# Patient Record
Sex: MALE | Race: WHITE | Employment: OTHER | URBAN - METROPOLITAN AREA
[De-identification: names, ages, dates, MRNs, and addresses within clinical notes are randomized per-mention and may not be internally consistent; named-entity substitution may affect disease eponyms.]

---

## 2017-02-07 ENCOUNTER — TELEPHONE (OUTPATIENT)
Dept: CARDIOLOGY CLINIC | Age: 72
End: 2017-02-07

## 2017-02-28 RX ORDER — HYDRALAZINE HYDROCHLORIDE 50 MG/1
TABLET, FILM COATED ORAL
Qty: 270 TABLET | Refills: 11 | OUTPATIENT
Start: 2017-02-28

## 2017-03-13 RX ORDER — RAMIPRIL 10 MG/1
CAPSULE ORAL
Qty: 180 CAPSULE | Refills: 3 | Status: SHIPPED | OUTPATIENT
Start: 2017-03-13 | End: 2017-09-28 | Stop reason: ALTCHOICE

## 2017-04-05 ENCOUNTER — OFFICE VISIT (OUTPATIENT)
Dept: CARDIOLOGY CLINIC | Age: 72
End: 2017-04-05

## 2017-04-05 VITALS
WEIGHT: 209 LBS | DIASTOLIC BLOOD PRESSURE: 56 MMHG | HEIGHT: 72 IN | HEART RATE: 60 BPM | SYSTOLIC BLOOD PRESSURE: 116 MMHG | BODY MASS INDEX: 28.31 KG/M2

## 2017-04-05 DIAGNOSIS — E78.2 MIXED HYPERLIPIDEMIA: ICD-10-CM

## 2017-04-05 DIAGNOSIS — I10 ESSENTIAL HYPERTENSION, BENIGN: ICD-10-CM

## 2017-04-05 DIAGNOSIS — I25.10 ATHEROSCLEROSIS OF NATIVE CORONARY ARTERY OF NATIVE HEART, ANGINA PRESENCE UNSPECIFIED: ICD-10-CM

## 2017-04-05 DIAGNOSIS — R53.83 FATIGUE, UNSPECIFIED TYPE: Primary | ICD-10-CM

## 2017-04-05 DIAGNOSIS — R07.9 CHEST PAIN, UNSPECIFIED TYPE: ICD-10-CM

## 2017-04-05 DIAGNOSIS — I25.10 CORONARY ARTERY DISEASE INVOLVING NATIVE HEART WITHOUT ANGINA PECTORIS, UNSPECIFIED VESSEL OR LESION TYPE: ICD-10-CM

## 2017-04-05 DIAGNOSIS — I25.10 CORONARY ARTERY DISEASE INVOLVING NATIVE CORONARY ARTERY OF NATIVE HEART WITHOUT ANGINA PECTORIS: ICD-10-CM

## 2017-04-05 PROCEDURE — 1036F TOBACCO NON-USER: CPT | Performed by: INTERNAL MEDICINE

## 2017-04-05 PROCEDURE — G8598 ASA/ANTIPLAT THER USED: HCPCS | Performed by: INTERNAL MEDICINE

## 2017-04-05 PROCEDURE — 99214 OFFICE O/P EST MOD 30 MIN: CPT | Performed by: INTERNAL MEDICINE

## 2017-04-05 PROCEDURE — 93000 ELECTROCARDIOGRAM COMPLETE: CPT | Performed by: INTERNAL MEDICINE

## 2017-04-05 PROCEDURE — G8427 DOCREV CUR MEDS BY ELIG CLIN: HCPCS | Performed by: INTERNAL MEDICINE

## 2017-04-05 PROCEDURE — 1123F ACP DISCUSS/DSCN MKR DOCD: CPT | Performed by: INTERNAL MEDICINE

## 2017-04-05 PROCEDURE — 3017F COLORECTAL CA SCREEN DOC REV: CPT | Performed by: INTERNAL MEDICINE

## 2017-04-05 PROCEDURE — G8420 CALC BMI NORM PARAMETERS: HCPCS | Performed by: INTERNAL MEDICINE

## 2017-04-05 PROCEDURE — 4040F PNEUMOC VAC/ADMIN/RCVD: CPT | Performed by: INTERNAL MEDICINE

## 2017-04-05 RX ORDER — AMLODIPINE BESYLATE 10 MG/1
10 TABLET ORAL DAILY
Qty: 90 TABLET | Refills: 3 | Status: SHIPPED | OUTPATIENT
Start: 2017-04-05 | End: 2017-09-28 | Stop reason: SDUPTHER

## 2017-04-05 RX ORDER — LABETALOL 200 MG/1
200 TABLET, FILM COATED ORAL EVERY 12 HOURS SCHEDULED
Qty: 180 TABLET | Refills: 3 | Status: SHIPPED | OUTPATIENT
Start: 2017-04-05 | End: 2018-05-01 | Stop reason: SDUPTHER

## 2017-04-05 RX ORDER — NITROGLYCERIN 400 UG/1
1 SPRAY ORAL EVERY 5 MIN PRN
Qty: 1 BOTTLE | Refills: 2 | Status: SHIPPED | OUTPATIENT
Start: 2017-04-05

## 2017-05-30 ENCOUNTER — OFFICE VISIT (OUTPATIENT)
Dept: CARDIOLOGY CLINIC | Age: 72
End: 2017-05-30

## 2017-05-30 VITALS
SYSTOLIC BLOOD PRESSURE: 142 MMHG | WEIGHT: 208 LBS | DIASTOLIC BLOOD PRESSURE: 76 MMHG | HEART RATE: 64 BPM | BODY MASS INDEX: 28.21 KG/M2

## 2017-05-30 DIAGNOSIS — I25.10 CORONARY ARTERY DISEASE DUE TO LIPID RICH PLAQUE: Primary | ICD-10-CM

## 2017-05-30 DIAGNOSIS — I65.23 BILATERAL CAROTID ARTERY STENOSIS: ICD-10-CM

## 2017-05-30 DIAGNOSIS — Z13.9 SCREENING: ICD-10-CM

## 2017-05-30 DIAGNOSIS — I10 ESSENTIAL HYPERTENSION, BENIGN: ICD-10-CM

## 2017-05-30 DIAGNOSIS — I25.83 CORONARY ARTERY DISEASE DUE TO LIPID RICH PLAQUE: Primary | ICD-10-CM

## 2017-05-30 DIAGNOSIS — E78.2 MIXED HYPERLIPIDEMIA: ICD-10-CM

## 2017-05-30 PROCEDURE — G8598 ASA/ANTIPLAT THER USED: HCPCS | Performed by: INTERNAL MEDICINE

## 2017-05-30 PROCEDURE — G8420 CALC BMI NORM PARAMETERS: HCPCS | Performed by: INTERNAL MEDICINE

## 2017-05-30 PROCEDURE — 3017F COLORECTAL CA SCREEN DOC REV: CPT | Performed by: INTERNAL MEDICINE

## 2017-05-30 PROCEDURE — 1123F ACP DISCUSS/DSCN MKR DOCD: CPT | Performed by: INTERNAL MEDICINE

## 2017-05-30 PROCEDURE — 99214 OFFICE O/P EST MOD 30 MIN: CPT | Performed by: INTERNAL MEDICINE

## 2017-05-30 PROCEDURE — 4040F PNEUMOC VAC/ADMIN/RCVD: CPT | Performed by: INTERNAL MEDICINE

## 2017-05-30 PROCEDURE — 1036F TOBACCO NON-USER: CPT | Performed by: INTERNAL MEDICINE

## 2017-05-30 PROCEDURE — G8427 DOCREV CUR MEDS BY ELIG CLIN: HCPCS | Performed by: INTERNAL MEDICINE

## 2017-05-30 RX ORDER — OMEPRAZOLE 40 MG/1
40 CAPSULE, DELAYED RELEASE ORAL DAILY
COMMUNITY
End: 2019-01-01

## 2017-05-30 RX ORDER — SENNOSIDES 8.6 MG
TABLET ORAL
COMMUNITY
End: 2017-09-28 | Stop reason: ALTCHOICE

## 2017-06-29 RX ORDER — CLOPIDOGREL BISULFATE 75 MG/1
TABLET ORAL
Qty: 90 TABLET | Refills: 3 | Status: SHIPPED | OUTPATIENT
Start: 2017-06-29 | End: 2019-07-22 | Stop reason: SDUPTHER

## 2017-09-26 ENCOUNTER — TELEPHONE (OUTPATIENT)
Dept: CARDIOLOGY CLINIC | Age: 72
End: 2017-09-26

## 2017-09-26 NOTE — TELEPHONE ENCOUNTER
Left a message for pt to call us back in regards to ramipril. Wanted to see if pt is still taking and if not then why? We received fax from [facility] that pt may not be taking this script.

## 2017-09-27 NOTE — TELEPHONE ENCOUNTER
Bettie Meza is waiting on pt to call back so she can put him in your cancelled appointment slot for tomorrow is this ok?

## 2017-09-28 ENCOUNTER — OFFICE VISIT (OUTPATIENT)
Dept: CARDIOLOGY CLINIC | Age: 72
End: 2017-09-28

## 2017-09-28 VITALS
WEIGHT: 207 LBS | HEART RATE: 60 BPM | DIASTOLIC BLOOD PRESSURE: 80 MMHG | SYSTOLIC BLOOD PRESSURE: 126 MMHG | OXYGEN SATURATION: 96 % | BODY MASS INDEX: 28.04 KG/M2 | HEIGHT: 72 IN

## 2017-09-28 DIAGNOSIS — I25.10 ATHEROSCLEROSIS OF NATIVE CORONARY ARTERY OF NATIVE HEART, ANGINA PRESENCE UNSPECIFIED: ICD-10-CM

## 2017-09-28 DIAGNOSIS — E78.5 HYPERLIPIDEMIA, UNSPECIFIED HYPERLIPIDEMIA TYPE: ICD-10-CM

## 2017-09-28 DIAGNOSIS — I65.23 OCCLUSION AND STENOSIS OF BILATERAL CAROTID ARTERIES: ICD-10-CM

## 2017-09-28 DIAGNOSIS — R07.9 CHEST PAIN, UNSPECIFIED TYPE: ICD-10-CM

## 2017-09-28 DIAGNOSIS — R53.83 FATIGUE, UNSPECIFIED TYPE: ICD-10-CM

## 2017-09-28 DIAGNOSIS — I10 ESSENTIAL HYPERTENSION, BENIGN: ICD-10-CM

## 2017-09-28 DIAGNOSIS — I25.10 CORONARY ARTERY DISEASE DUE TO LIPID RICH PLAQUE: Primary | ICD-10-CM

## 2017-09-28 DIAGNOSIS — I25.83 CORONARY ARTERY DISEASE DUE TO LIPID RICH PLAQUE: Primary | ICD-10-CM

## 2017-09-28 PROBLEM — E78.2 MIXED HYPERLIPIDEMIA: Chronic | Status: ACTIVE | Noted: 2017-05-30

## 2017-09-28 PROCEDURE — 93000 ELECTROCARDIOGRAM COMPLETE: CPT | Performed by: INTERNAL MEDICINE

## 2017-09-28 PROCEDURE — 4040F PNEUMOC VAC/ADMIN/RCVD: CPT | Performed by: INTERNAL MEDICINE

## 2017-09-28 PROCEDURE — 99214 OFFICE O/P EST MOD 30 MIN: CPT | Performed by: INTERNAL MEDICINE

## 2017-09-28 PROCEDURE — 3017F COLORECTAL CA SCREEN DOC REV: CPT | Performed by: INTERNAL MEDICINE

## 2017-09-28 PROCEDURE — 1036F TOBACCO NON-USER: CPT | Performed by: INTERNAL MEDICINE

## 2017-09-28 PROCEDURE — G8417 CALC BMI ABV UP PARAM F/U: HCPCS | Performed by: INTERNAL MEDICINE

## 2017-09-28 PROCEDURE — G8427 DOCREV CUR MEDS BY ELIG CLIN: HCPCS | Performed by: INTERNAL MEDICINE

## 2017-09-28 PROCEDURE — 1123F ACP DISCUSS/DSCN MKR DOCD: CPT | Performed by: INTERNAL MEDICINE

## 2017-09-28 PROCEDURE — G8598 ASA/ANTIPLAT THER USED: HCPCS | Performed by: INTERNAL MEDICINE

## 2017-09-28 RX ORDER — AMPICILLIN TRIHYDRATE 250 MG
1 CAPSULE ORAL DAILY
Qty: 30 CAPSULE | Refills: 11 | COMMUNITY
Start: 2017-09-28

## 2017-09-28 RX ORDER — AMLODIPINE BESYLATE 5 MG/1
5 TABLET ORAL DAILY
Qty: 90 TABLET | Refills: 3 | Status: SHIPPED | OUTPATIENT
Start: 2017-09-28 | End: 2017-11-08 | Stop reason: SDUPTHER

## 2017-10-16 ENCOUNTER — HOSPITAL ENCOUNTER (OUTPATIENT)
Dept: NON INVASIVE DIAGNOSTICS | Age: 72
Discharge: HOME | End: 2017-10-16
Admitting: INTERNAL MEDICINE

## 2017-10-16 DIAGNOSIS — I25.10 ATHEROSCLEROTIC HEART DISEASE OF NATIVE CORONARY ARTERY WITHOUT ANGINA PECTORIS: ICD-10-CM

## 2017-10-16 DIAGNOSIS — I65.23 BILATERAL CAROTID ARTERY STENOSIS: ICD-10-CM

## 2017-10-16 LAB
LEFT VENTRICULAR EJECTION FRACTION HIGH VALUE: 55 %
LEFT VENTRICULAR EJECTION FRACTION MODE: NORMAL
LV EF: 55 %
LV EF: 63 %
LVEF MODALITY: NORMAL
LVEF MODALITY: NORMAL

## 2017-10-18 ENCOUNTER — TELEPHONE (OUTPATIENT)
Dept: CARDIOLOGY CLINIC | Age: 72
End: 2017-10-18

## 2017-10-19 ENCOUNTER — OFFICE VISIT (OUTPATIENT)
Dept: NEUROLOGY | Age: 72
End: 2017-10-19

## 2017-10-19 ENCOUNTER — HOSPITAL ENCOUNTER (OUTPATIENT)
Dept: OTHER | Age: 72
Discharge: HOME | End: 2017-10-19
Attending: PSYCHIATRY & NEUROLOGY | Admitting: PSYCHIATRY & NEUROLOGY

## 2017-10-19 ENCOUNTER — TELEPHONE (OUTPATIENT)
Dept: NEUROLOGY | Age: 72
End: 2017-10-19

## 2017-10-19 VITALS
DIASTOLIC BLOOD PRESSURE: 92 MMHG | HEIGHT: 72 IN | BODY MASS INDEX: 27.63 KG/M2 | HEART RATE: 62 BPM | SYSTOLIC BLOOD PRESSURE: 176 MMHG | WEIGHT: 204 LBS

## 2017-10-19 DIAGNOSIS — R27.0 ATAXIA: ICD-10-CM

## 2017-10-19 DIAGNOSIS — G95.9 MYELOPATHY (HCC): ICD-10-CM

## 2017-10-19 DIAGNOSIS — R27.0 ATAXIA: Primary | ICD-10-CM

## 2017-10-19 LAB
TSH SERPL DL<=0.05 MIU/L-ACNC: 1.66 UIU/ML (ref 0.27–4.2)
VITAMIN B-12: 333 PG/ML (ref 211–911)

## 2017-10-19 PROCEDURE — 1036F TOBACCO NON-USER: CPT | Performed by: PSYCHIATRY & NEUROLOGY

## 2017-10-19 PROCEDURE — G8417 CALC BMI ABV UP PARAM F/U: HCPCS | Performed by: PSYCHIATRY & NEUROLOGY

## 2017-10-19 PROCEDURE — G8427 DOCREV CUR MEDS BY ELIG CLIN: HCPCS | Performed by: PSYCHIATRY & NEUROLOGY

## 2017-10-19 PROCEDURE — 1123F ACP DISCUSS/DSCN MKR DOCD: CPT | Performed by: PSYCHIATRY & NEUROLOGY

## 2017-10-19 PROCEDURE — G8484 FLU IMMUNIZE NO ADMIN: HCPCS | Performed by: PSYCHIATRY & NEUROLOGY

## 2017-10-19 PROCEDURE — 3017F COLORECTAL CA SCREEN DOC REV: CPT | Performed by: PSYCHIATRY & NEUROLOGY

## 2017-10-19 PROCEDURE — 4040F PNEUMOC VAC/ADMIN/RCVD: CPT | Performed by: PSYCHIATRY & NEUROLOGY

## 2017-10-19 PROCEDURE — 99205 OFFICE O/P NEW HI 60 MIN: CPT | Performed by: PSYCHIATRY & NEUROLOGY

## 2017-10-19 PROCEDURE — G8598 ASA/ANTIPLAT THER USED: HCPCS | Performed by: PSYCHIATRY & NEUROLOGY

## 2017-10-19 NOTE — LETTER
[facility]  [address] [address]  Phone: [phone]  Fax: [phone]    Dori Nguyen MD        October 19, 2017       Patient: Tran Bahena   MR Number: [id number]   YOB: 1945   Date of Visit: 10/19/2017       Dear Dr. Pablo Arriola:    Thank you for the request for consultation for Jimenez Esquivel to me for the evaluation of Gait difficulty. Below are the relevant portions of my assessment and plan of care. NEUROLOGY CONSULTATION     Chief Complaint   Patient presents with    Neurologic Problem     Patient is here today to establish care for foot frop in the right foot. Patient says that he has a shuffle as well when he walks at times. HISTORY OF PRESENT ILLNESS :    Jimenez Esquivel is a 70 y.o. male who is referred by Dr. Pablo Arriola   History was obtained from patient  Patient was referred for some gait difficulties. Patient states that his been dragging the right leg and foot for the last 1 month. He states that sometimes he also shuffles when he tries to walk.   Denies any actual focal weakness or numbness  Patient has had chronic low back pain and neck pain from arthritis  Symptom onset was subacute and is moderate in severity without any aggravating or relieving factors  No other neurological symptoms  Patient has diet-controlled diabetes  He also has hypertension and coronary artery disease as well as hyperlipidemia      REVIEW OF SYSTEMS    Constitutional:     Chills     Fatigue     Fevers     Malaise     Weight loss      Denies all of the above    Eyes:    Double vision     Blurry vision      Denies all of the above    Ears, nose, mouth, throat, and face:    Hearing loss       Hoarseness        Snoring      Tinnitus        Denies all of the above     Respiratory:     Cough      Shortness of breath          Denies all of the above Cardiovascular:     Chest pain      Exertional chest pressure/discomfort            Palpitations      Syncope      Denies all of the above    Gastrointestinal:     Abdominal pain     Constipation      Diarrhea       Dysphagia                       Denies all of the above    Genitourinary:        Frequency     Hematuria       Urinary incontinence            Denies all of the above     Hematologic/lymphatic:    Bleeding      Easy bruising     Anemia   Denies all of the above     Musculoskeletal:    Back pain         Myalgias      Neck pain            Denies all of the above    Neurological: As noted in HPI    Behavioral/Psych:     Anxiety      Depression       Mood swings      Denies all of the above     Endocrine:     Temperature intolerance      Fatigue       Denies all of the above     Allergic/Immunologic:    Hay fever     Denies all of the above     Past Medical History:   Diagnosis Date    Acute MI 2000    CAD (coronary artery disease)     Hyperlipidemia     Hypertension     Type II or unspecified type diabetes mellitus without mention of complication, not stated as uncontrolled     diet controlled     Family History   Problem Relation Age of Onset    Heart Disease Father     High Blood Pressure Father     High Cholesterol Father     Heart Disease Maternal Grandfather     Heart Disease Paternal Grandfather      Social History     Social History    Marital status:      Spouse name: N/A    Number of children: N/A    Years of education: N/A     Social History Main Topics    Smoking status: Former Smoker     Packs/day: 0.00     Years: 20.00     Quit date: 2/24/1982    Smokeless tobacco: Never Used    Alcohol use No    Drug use: No    Sexual activity: Not Asked     Other Topics Concern    None     Social History Narrative    None       PHYSICAL EXAMINATION:  BP (!) 176/92   Pulse 62   Ht 6' (1.829 m)   Wt 204 lb (92.5 kg)   BMI 27.67 kg/m² Appearance: Well appearing, well nourished and in no distress  Mental Status Exam: Patient is alert, oriented to person, place and time. Recent and remote memory is normal  Fund of Knowledge is normal  Attention/concentration is normal.   Speech : No dysarthria  Language : No aphasia  Funduscopic Exam: sharp disc margins  Cranial Nerves:   II: Visual fields:  Full to confrontation  III: Pupils:  equal, round, reactive to light  III,IV,VI: Extra Ocular Movements are intact. No nystagmus  V: Facial sensation is intact to pin prick and light touch  VII: Facial strength and movements: intact and symmetric smile,cheek puffing and eyebrow elevation  VIII: Hearing:  Intact to finger rub bilaterally  IX: Palate  elevation is symmetric  XI: Shoulder shrug is intact  XII: Tongue movements are normal  Motor:  Muscle tone and bulk are normal.   Strength is symmetrical 5/5 in all four extremities. Sensory: Intact to light touch and  pin prick in all four extremities  Coordination:  Normal  Finger to Nose and Heel to Shin bilaterally    . Reflexes:  DTR 1+ in the upper extremities and 3+ in the knees and 2+ in the ankles  Plantar response:  Withdrawal response bilaterally  Gait: Gait is slightly unsteady and patient has some difficulty with tandem walking   Romberg: negative  Vascular: No carotid bruit bilaterally        DATA:  LABS:  General Labs:    CBC:   Lab Results   Component Value Date    WBC 4.7 11/17/2016    RBC 4.53 11/17/2016    HGB 13.5 11/17/2016    HCT 39.6 11/17/2016    MCV 87.5 11/17/2016    MCH 29.9 11/17/2016    MCHC 34.2 11/17/2016    RDW 14.2 11/17/2016     11/17/2016    MPV 8.1 11/17/2016     BMP:    Lab Results   Component Value Date     11/17/2016    K 4.0 11/17/2016     11/17/2016    CO2 25 11/17/2016    BUN 14 11/17/2016    LABALBU 4.1 01/21/2016    CREATININE 0.9 11/17/2016    CALCIUM 9.0 11/17/2016    GFRAA >60 11/17/2016    GFRAA >60 02/25/2012    LABGLOM >60 11/17/2016

## 2017-10-19 NOTE — PROGRESS NOTES
NEUROLOGY CONSULTATION     Chief Complaint   Patient presents with    Neurologic Problem     Patient is here today to establish care for foot frop in the right foot. Patient says that he has a shuffle as well when he walks at times. HISTORY OF PRESENT ILLNESS :    Alaina Vela is a 70 y.o. male who is referred by Dr. Mac Wykoff   History was obtained from patient  Patient was referred for some gait difficulties. Patient states that his been dragging the right leg and foot for the last 1 month. He states that sometimes he also shuffles when he tries to walk.   Denies any actual focal weakness or numbness  Patient has had chronic low back pain and neck pain from arthritis  Symptom onset was subacute and is moderate in severity without any aggravating or relieving factors  No other neurological symptoms  Patient has diet-controlled diabetes  He also has hypertension and coronary artery disease as well as hyperlipidemia      REVIEW OF SYSTEMS    Constitutional:  []   Chills   []  Fatigue   []  Fevers   []  Malaise   []  Weight loss     [x] Denies all of the above    Eyes:  []  Double vision   []  Blurry vision     [x] Denies all of the above    Ears, nose, mouth, throat, and face:   [] Hearing loss    []   Hoarseness      []  Snoring    []  Tinnitus       [x] Denies all of the above     Respiratory:   []  Cough    []  Shortness of breath         [x] Denies all of the above     Cardiovascular:   []  Chest pain    []  Exertional chest pressure/discomfort           [] Palpitations    []  Syncope     [x] Denies all of the above    Gastrointestinal:   []  Abdominal pain   []  Constipation    []  Diarrhea    []   Dysphagia                      [x] Denies all of the above    Genitourinary:      []  Frequency   []  Hematuria     []  Urinary incontinence           [x] Denies all of the above     Hematologic/lymphatic:  []  Bleeding    []  Easy bruising   []  Anemia  [x] Denies all of the above     Musculoskeletal:   [x] Back pain       []  Myalgias    [x]  Neck pain           [] Denies all of the above    Neurological: As noted in HPI    Behavioral/Psych:   [] Anxiety    []  Depression     []  Mood swings     [x] Denies all of the above     Endocrine:   []  Temperature intolerance     [] Fatigue      [x] Denies all of the above     Allergic/Immunologic:   [] Hay fever    [x] Denies all of the above     Past Medical History:   Diagnosis Date    Acute MI 2000    CAD (coronary artery disease)     Hyperlipidemia     Hypertension     Type II or unspecified type diabetes mellitus without mention of complication, not stated as uncontrolled     diet controlled     Family History   Problem Relation Age of Onset    Heart Disease Father     High Blood Pressure Father     High Cholesterol Father     Heart Disease Maternal Grandfather     Heart Disease Paternal Grandfather      Social History     Social History    Marital status:      Spouse name: N/A    Number of children: N/A    Years of education: N/A     Social History Main Topics    Smoking status: Former Smoker     Packs/day: 0.00     Years: 20.00     Quit date: 2/24/1982    Smokeless tobacco: Never Used    Alcohol use No    Drug use: No    Sexual activity: Not Asked     Other Topics Concern    None     Social History Narrative    None       PHYSICAL EXAMINATION:  BP (!) 176/92   Pulse 62   Ht 6' (1.829 m)   Wt 204 lb (92.5 kg)   BMI 27.67 kg/m²   Appearance: Well appearing, well nourished and in no distress  Mental Status Exam: Patient is alert, oriented to person, place and time.    Recent and remote memory is normal  Fund of Knowledge is normal  Attention/concentration is normal.   Speech : No dysarthria  Language : No aphasia  Funduscopic Exam: sharp disc margins  Cranial Nerves:   II: Visual fields:  Full to confrontation  III: Pupils:  equal, round, reactive to light  III,IV,VI: Extra Ocular Movements are intact. No nystagmus  V: Facial sensation is intact to pin prick and light touch  VII: Facial strength and movements: intact and symmetric smile,cheek puffing and eyebrow elevation  VIII: Hearing:  Intact to finger rub bilaterally  IX: Palate  elevation is symmetric  XI: Shoulder shrug is intact  XII: Tongue movements are normal  Motor:  Muscle tone and bulk are normal.   Strength is symmetrical 5/5 in all four extremities. Sensory: Intact to light touch and  pin prick in all four extremities  Coordination:  Normal  Finger to Nose and Heel to Shin bilaterally    . Reflexes:  DTR 1+ in the upper extremities and 3+ in the knees and 2+ in the ankles  Plantar response:  Withdrawal response bilaterally  Gait: Gait is slightly unsteady and patient has some difficulty with tandem walking   Romberg: negative  Vascular: No carotid bruit bilaterally        DATA:  LABS:  General Labs:    CBC:   Lab Results   Component Value Date    WBC 4.7 11/17/2016    RBC 4.53 11/17/2016    HGB 13.5 11/17/2016    HCT 39.6 11/17/2016    MCV 87.5 11/17/2016    MCH 29.9 11/17/2016    MCHC 34.2 11/17/2016    RDW 14.2 11/17/2016     11/17/2016    MPV 8.1 11/17/2016     BMP:    Lab Results   Component Value Date     11/17/2016    K 4.0 11/17/2016     11/17/2016    CO2 25 11/17/2016    BUN 14 11/17/2016    LABALBU 4.1 01/21/2016    CREATININE 0.9 11/17/2016    CALCIUM 9.0 11/17/2016    GFRAA >60 11/17/2016    GFRAA >60 02/25/2012    LABGLOM >60 11/17/2016    GLUCOSE 135 11/17/2016       IMPRESSION :  Ataxia  The brisk reflexes in the lower extremities brace the possibility of myelopathy either in the cervical or thoracic region  Hemispheric lesion seems less likely at this point but cannot be totally ruled out  We also need to rule out metabolic etiologies like J97 and copper deficiencies      RECOMMENDATIONS :  Discussed with patient  I will get an MRI of the cervical and thoracic

## 2017-10-22 LAB — COPPER: 103 UG/DL (ref 70–140)

## 2017-10-25 ENCOUNTER — HOSPITAL ENCOUNTER (OUTPATIENT)
Dept: MRI IMAGING | Age: 72
Discharge: HOME | End: 2017-10-25
Admitting: PSYCHIATRY & NEUROLOGY

## 2017-10-25 DIAGNOSIS — R27.0 ATAXIA: ICD-10-CM

## 2017-10-25 DIAGNOSIS — G95.9 MYELOPATHY (HCC): ICD-10-CM

## 2017-11-08 ENCOUNTER — TELEPHONE (OUTPATIENT)
Dept: NEUROLOGY | Age: 72
End: 2017-11-08

## 2017-11-08 ENCOUNTER — OFFICE VISIT (OUTPATIENT)
Dept: CARDIOLOGY CLINIC | Age: 72
End: 2017-11-08

## 2017-11-08 VITALS
HEIGHT: 72 IN | DIASTOLIC BLOOD PRESSURE: 96 MMHG | SYSTOLIC BLOOD PRESSURE: 200 MMHG | HEART RATE: 66 BPM | WEIGHT: 204 LBS | BODY MASS INDEX: 27.63 KG/M2

## 2017-11-08 DIAGNOSIS — E78.2 MIXED HYPERLIPIDEMIA: Chronic | ICD-10-CM

## 2017-11-08 DIAGNOSIS — R29.898 WEAKNESS OF LEFT LEG: Primary | ICD-10-CM

## 2017-11-08 DIAGNOSIS — I65.23 OCCLUSION AND STENOSIS OF BILATERAL CAROTID ARTERIES: ICD-10-CM

## 2017-11-08 DIAGNOSIS — I25.10 ATHEROSCLEROSIS OF NATIVE CORONARY ARTERY OF NATIVE HEART, ANGINA PRESENCE UNSPECIFIED: ICD-10-CM

## 2017-11-08 DIAGNOSIS — I10 ESSENTIAL HYPERTENSION, BENIGN: Chronic | ICD-10-CM

## 2017-11-08 DIAGNOSIS — I25.10 ATHEROSCLEROSIS OF NATIVE CORONARY ARTERY OF NATIVE HEART WITHOUT ANGINA PECTORIS: Primary | Chronic | ICD-10-CM

## 2017-11-08 DIAGNOSIS — I25.10 CORONARY ARTERY DISEASE DUE TO LIPID RICH PLAQUE: ICD-10-CM

## 2017-11-08 DIAGNOSIS — E78.5 HYPERLIPIDEMIA, UNSPECIFIED HYPERLIPIDEMIA TYPE: ICD-10-CM

## 2017-11-08 DIAGNOSIS — R07.9 CHEST PAIN, UNSPECIFIED TYPE: ICD-10-CM

## 2017-11-08 DIAGNOSIS — I25.83 CORONARY ARTERY DISEASE DUE TO LIPID RICH PLAQUE: ICD-10-CM

## 2017-11-08 DIAGNOSIS — R53.83 FATIGUE, UNSPECIFIED TYPE: ICD-10-CM

## 2017-11-08 DIAGNOSIS — I65.23 BILATERAL CAROTID ARTERY STENOSIS: ICD-10-CM

## 2017-11-08 DIAGNOSIS — R27.0 ATAXIA: ICD-10-CM

## 2017-11-08 PROCEDURE — 4040F PNEUMOC VAC/ADMIN/RCVD: CPT | Performed by: INTERNAL MEDICINE

## 2017-11-08 PROCEDURE — G8484 FLU IMMUNIZE NO ADMIN: HCPCS | Performed by: INTERNAL MEDICINE

## 2017-11-08 PROCEDURE — 1036F TOBACCO NON-USER: CPT | Performed by: INTERNAL MEDICINE

## 2017-11-08 PROCEDURE — 1123F ACP DISCUSS/DSCN MKR DOCD: CPT | Performed by: INTERNAL MEDICINE

## 2017-11-08 PROCEDURE — 3017F COLORECTAL CA SCREEN DOC REV: CPT | Performed by: INTERNAL MEDICINE

## 2017-11-08 PROCEDURE — G8417 CALC BMI ABV UP PARAM F/U: HCPCS | Performed by: INTERNAL MEDICINE

## 2017-11-08 PROCEDURE — G8427 DOCREV CUR MEDS BY ELIG CLIN: HCPCS | Performed by: INTERNAL MEDICINE

## 2017-11-08 PROCEDURE — 99214 OFFICE O/P EST MOD 30 MIN: CPT | Performed by: INTERNAL MEDICINE

## 2017-11-08 PROCEDURE — G8598 ASA/ANTIPLAT THER USED: HCPCS | Performed by: INTERNAL MEDICINE

## 2017-11-08 RX ORDER — ROSUVASTATIN CALCIUM 10 MG/1
10 TABLET, COATED ORAL DAILY
Qty: 90 TABLET | Refills: 3 | Status: SHIPPED | OUTPATIENT
Start: 2017-11-08 | End: 2018-11-18 | Stop reason: SDUPTHER

## 2017-11-08 RX ORDER — LOSARTAN POTASSIUM 50 MG/1
50 TABLET ORAL DAILY
Qty: 90 TABLET | Refills: 3 | Status: SHIPPED | OUTPATIENT
Start: 2017-11-08 | End: 2019-10-16 | Stop reason: SDUPTHER

## 2017-11-08 RX ORDER — AMLODIPINE BESYLATE 10 MG/1
10 TABLET ORAL DAILY
Qty: 90 TABLET | Refills: 3 | Status: SHIPPED | OUTPATIENT
Start: 2017-11-08 | End: 2018-07-05 | Stop reason: SDUPTHER

## 2017-11-08 NOTE — PROGRESS NOTES
[facility]   Cardiac Follow up    Referring Provider:  Toney Anguiano     Chief Complaint   Patient presents with    Coronary Artery Disease     No cardiac complaints    Fatigue    Hyperlipidemia        History of Present Illness:  Mr. Evie Fraser is a 70year old gentleman here today with complaints of back/chest pain that reminds him of how he felt with previous stenting   He has a history of CAD, hypertension, hyperlipidemia. Has untreated sleep apnea, states he cant wear the CPAP due to claustrophobia. In September 2014 , he had a ISELA to an 80% Circ lesion between 2 patent stents. November of 2016 he underwent stenting of the disatal RCA and Mid LAD with complete resolution of his sx. He denies chest discomfort though has noted increased fatigue and some muscle aching which improved with coQ 10. Major issue is that he has significant reduction in energy. Stress test showed a small fixed defect without ischemia. Echo showed Mild AS with normal EF. /105, 201/114, 173/91, 140/82. Repeat today is 200/96    Past Medical History:   has a past medical history of Acute MI; CAD (coronary artery disease); Hyperlipidemia; Hypertension; and Type II or unspecified type diabetes mellitus without mention of complication, not stated as uncontrolled. Surgical History:   has a past surgical history that includes Coronary angioplasty with stent (2000, 2/24/2012); Hand surgery (5/09); and ESOPHAGOSCOPY. Social History:   reports that he quit smoking about 35 years ago. He smoked 0.00 packs per day for 20.00 years. He has never used smokeless tobacco. He reports that he does not drink alcohol or use drugs. Family History:  family history includes Heart Disease in his father, maternal grandfather, and paternal grandfather; High Blood Pressure in his father; High Cholesterol in his father.      Home Medications:  Outpatient Encounter Prescriptions as of 11/8/2017   Medication Sig Dispense Refill    amLODIPine (NORVASC) 5 MG tablet Take 1 tablet by mouth daily 90 tablet 3    Coenzyme Q10 (COQ10) 200 MG CAPS Take 1 tablet by mouth daily 30 capsule 11    clopidogrel (PLAVIX) 75 MG tablet TAKE 1 TABLET BY MOUTH DAILY 90 tablet 3    omeprazole (PRILOSEC) 40 MG delayed release capsule Take 40 mg by mouth daily      labetalol (NORMODYNE) 200 MG tablet Take 1 tablet by mouth every 12 hours 180 tablet 3    nitroGLYCERIN (NITROLINGUAL) 0.4 MG/SPRAY 0.4 mg spray Place 1 spray under the tongue every 5 minutes as needed for Chest pain 1 Bottle 2    rosuvastatin (CRESTOR) 10 MG tablet Take 1 tablet by mouth daily 90 tablet 3    HYDROcodone-acetaminophen (NORCO)  MG per tablet Take 1 tablet by mouth every 6 hours as needed for Pain      sildenafil (VIAGRA) 100 MG tablet Take 1 tablet by mouth as needed for Erectile Dysfunction 8 tablet 3    aspirin 81 MG tablet Take 81 mg by mouth daily       No facility-administered encounter medications on file as of 11/8/2017. Allergies:  Review of patient's allergies indicates no known allergies. Review of Systems:   · Constitutional: there has been no unanticipated weight loss. Theres been change in energy level, none in sleep pattern    · Eyes: No visual changes or diplopia. No scleral icterus. · ENT: No Headaches,  or vertigo. No mouth sores or sore throat. complains of hearing loss  · Cardiovascular: Reviewed in HPI  · Respiratory: No cough or wheezing, no sputum production. No hematemesis. · Gastrointestinal: No abdominal pain, appetite loss, blood in stools. No change in bowel or bladder habits. · Genitourinary: No dysuria, trouble voiding, or hematuria. · Musculoskeletal:  No gait disturbance, weakness--complain of back pain  · Integumentary: No rash or pruritis. · Neurological: No headache, diplopia, change in muscle strength, numbness or tingling.  No change in gait, balance, coordination, mood, affect, memory, mentation, behavior. · Psychiatric: No anxiety, no depression. · Endocrine: No malaise, fatigue or temperature intolerance. No excessive thirst, fluid intake, or urination. No tremor. · Hematologic/Lymphatic: No abnormal bruising or bleeding, blood clots or swollen lymph nodes. Physical Examination:    Vitals:    11/08/17 1023   BP: 129/76--200/96   Pulse: 66        Constitutional and General Appearance: NAD, healthy-appearing  Respiratory:  · Normal excursion and expansion without use of accessory muscles  · Resp Auscultation: Normal breath sounds without dullness  Cardiovascular:  · The apical impulses not displaced   · Heart tones are crisp and normal  · Cervical veins are not engorgedbruit right sided  · The carotid upstroke is normal in amplitude and contour without delay or bruit  · 2/6 KHANH  · Peripheral pulses are symmetrical and full   -CWT  · There is no clubbing, cyanosis of the extremities. · No edema  · Femoral Arteries: 2+ and equal  · Pedal Pulses: 2+ and equal   Abdomen:  · No masses or tenderness  · Liver/Spleen: No Abnormalities Noted  Neurological/Psychiatric:  · Alert and oriented in all spheres  · Moves all extremities well  · Exhibits normal gait balance and coordination  · No abnormalities of mood, affect, memory, mentation, or behavior are noted  Echo 1/2015:  Normal left ventricle size and systolic function with an estimated ejection fraction of 60%. No regional wall motion abnormalities are seen. There is moderate concentric left ventricular hypertrophy. Normal diastolic filling pattern for age. Mild mitral regurgitation is present. The aortic valve appears sclerotic but opens well. There is mild tricuspid regurgitation with RVSP estimated at 34 mmHg. 1/22/16 myoview--  Summary   -Small-moderate sized inferior fixed defect consistent with infarction in   the territory of the mid and distal RCA . -Normal LV function.   -There is no evidence of stress induced ischemia.        Assessment: 1.   Chest pain-resolved--Fatigue--? Anginal equivalent--Now resolved--? Secondary to uncontolled HTN    2.    CAD         9/12/14  Cardiac cath--80% Cx lesion between 2 patent stents. 2.75 X 15 mm ISELA with no residual         Patent stent in RCA Mild inferior wall hypokinesia with LVEF = 50-55%         1/22/16 myoview negative          3.    htn--Poor control --Suggest increase Amlodipine 10 mg/day and losartan 50 mg/day    4.    hchol--8/11  LDL 48  On crestor 10 mg daily--    5. Occlusion and stenosis of carotid arteries--1/15 16-49% bilaterally--will follow up with this after chest pain evaluated--Reviewed stable --Yearly F/U    6. AS -Mild stenosis--Follow    7. Memory Loss--Neuro appt scheduled. Plan:  CPT-Restart Co Q10  Norvasc 10 mg/day--Losartan 50mg/day. F/U in 1 month--Titrate Losartan for sub-optimal control      DAX Lees M.D., [facility].

## 2017-11-08 NOTE — LETTER
[facility] - [address]  [address] [address] [place] [address] [address]  Phone: [phone]  Fax: [phone]    Anabelle Tolbert MD        November 29, 2017     [facility]  [address] [facility] [address]    Patient: Tran Bahena  MR Number: [id number]  YOB: 1945  Date of Visit: 11/8/2017    Dear Dr. MAYES:    Below are the relevant portions of my assessment and plan of care. [facility]   Cardiac Follow up    Referring Provider:  [facility]     Chief Complaint   Patient presents with    Coronary Artery Disease     No cardiac complaints    Fatigue    Hyperlipidemia        History of Present Illness:  Mr. Zeenat Amado is a 70year old gentleman here today with complaints of back/chest pain that reminds him of how he felt with previous stenting   He has a history of CAD, hypertension, hyperlipidemia. Has untreated sleep apnea, states he cant wear the CPAP due to claustrophobia. In September 2014 , he had a ISELA to an 80% Circ lesion between 2 patent stents. November of 2016 he underwent stenting of the disatal RCA and Mid LAD with complete resolution of his sx. He denies chest discomfort though has noted increased fatigue and some muscle aching which improved with coQ 10. Major issue is that he has significant reduction in energy. Stress test showed a small fixed defect without ischemia. Echo showed Mild AS with normal EF. /105, 201/114, 173/91, 140/82. Repeat today is 200/96    Past Medical History:   has a past medical history of Acute MI; CAD (coronary artery disease); Hyperlipidemia; Hypertension; and Type II or unspecified type diabetes mellitus without mention of complication, not stated as uncontrolled. Surgical History:   has a past surgical history that includes Coronary angioplasty with stent (2000, 2/24/2012); Hand surgery (5/09); and ESOPHAGOSCOPY.      Social History: ejection fraction of 60%. No regional wall motion abnormalities are seen. There is moderate concentric left ventricular hypertrophy. Normal diastolic filling pattern for age. Mild mitral regurgitation is present. The aortic valve appears sclerotic but opens well. There is mild tricuspid regurgitation with RVSP estimated at 34 mmHg. 1/22/16 myoview--  Summary   -Small-moderate sized inferior fixed defect consistent with infarction in   the territory of the mid and distal RCA . -Normal LV function.   -There is no evidence of stress induced ischemia. Assessment:   1. Chest pain-resolved--Fatigue--? Anginal equivalent--Now resolved--? Secondary to uncontolled HTN    2.    CAD         9/12/14  Cardiac cath--80% Cx lesion between 2 patent stents. 2.75 X 15 mm ISELA with no residual         Patent stent in RCA Mild inferior wall hypokinesia with LVEF = 50-55%         1/22/16 myoview negative          3.    htn--Poor control --Suggest increase Amlodipine 10 mg/day and losartan 50 mg/day    4.    hchol--8/11  LDL 48  On crestor 10 mg daily--    5. Occlusion and stenosis of carotid arteries--1/15 16-49% bilaterally--will follow up with this after chest pain evaluated--Reviewed stable --Yearly F/U    6. AS -Mild stenosis--Follow    7. Memory Loss--Neuro appt scheduled. Plan:  CPT-Restart Co Q10  Norvasc 10 mg/day--Losartan 50mg/day. F/U in 1 month--Titrate Losartan for sub-optimal control      DAX Aponte M.D., [facility]. If you have questions, please do not hesitate to call me. I look forward to following Opal Jorgensen along with you.     Sincerely,        Davey Salazar MD

## 2017-11-13 ENCOUNTER — TELEPHONE (OUTPATIENT)
Dept: NEUROLOGY | Age: 72
End: 2017-11-13

## 2017-11-13 RX ORDER — DIAZEPAM 10 MG/1
10 TABLET ORAL ONCE
Qty: 1 TABLET | Refills: 0 | OUTPATIENT
Start: 2017-11-13 | End: 2017-11-13

## 2017-11-13 RX ORDER — DIAZEPAM 10 MG/1
10 TABLET ORAL ONCE
COMMUNITY
End: 2017-11-13 | Stop reason: SDUPTHER

## 2017-11-14 ENCOUNTER — HOSPITAL ENCOUNTER (OUTPATIENT)
Dept: MRI IMAGING | Age: 72
Discharge: HOME | End: 2017-11-14
Attending: PSYCHIATRY & NEUROLOGY | Admitting: PSYCHIATRY & NEUROLOGY

## 2017-11-14 ENCOUNTER — TELEPHONE (OUTPATIENT)
Dept: CARDIOLOGY CLINIC | Age: 72
End: 2017-11-14

## 2017-11-14 DIAGNOSIS — R29.898 OTHER SYMPTOMS AND SIGNS INVOLVING THE MUSCULOSKELETAL SYSTEM: ICD-10-CM

## 2017-11-14 DIAGNOSIS — R29.898 WEAKNESS OF LEFT LEG: ICD-10-CM

## 2017-11-14 DIAGNOSIS — R27.0 ATAXIA: ICD-10-CM

## 2017-11-14 NOTE — TELEPHONE ENCOUNTER
I called wife and went over medication changes at latest office visit. Wife gave a verbal understanding.

## 2017-11-16 ENCOUNTER — OFFICE VISIT (OUTPATIENT)
Dept: NEUROLOGY | Age: 72
End: 2017-11-16

## 2017-11-16 VITALS
SYSTOLIC BLOOD PRESSURE: 183 MMHG | WEIGHT: 205.6 LBS | HEART RATE: 76 BPM | HEIGHT: 72 IN | BODY MASS INDEX: 27.85 KG/M2 | DIASTOLIC BLOOD PRESSURE: 107 MMHG

## 2017-11-16 DIAGNOSIS — E53.8 B12 DEFICIENCY: ICD-10-CM

## 2017-11-16 DIAGNOSIS — R27.0 ATAXIA: Primary | ICD-10-CM

## 2017-11-16 DIAGNOSIS — G93.89 CEREBRAL VENTRICULOMEGALY: ICD-10-CM

## 2017-11-16 PROCEDURE — 1036F TOBACCO NON-USER: CPT | Performed by: PSYCHIATRY & NEUROLOGY

## 2017-11-16 PROCEDURE — 99214 OFFICE O/P EST MOD 30 MIN: CPT | Performed by: PSYCHIATRY & NEUROLOGY

## 2017-11-16 PROCEDURE — 3017F COLORECTAL CA SCREEN DOC REV: CPT | Performed by: PSYCHIATRY & NEUROLOGY

## 2017-11-16 PROCEDURE — G8484 FLU IMMUNIZE NO ADMIN: HCPCS | Performed by: PSYCHIATRY & NEUROLOGY

## 2017-11-16 PROCEDURE — G8427 DOCREV CUR MEDS BY ELIG CLIN: HCPCS | Performed by: PSYCHIATRY & NEUROLOGY

## 2017-11-16 PROCEDURE — 1123F ACP DISCUSS/DSCN MKR DOCD: CPT | Performed by: PSYCHIATRY & NEUROLOGY

## 2017-11-16 PROCEDURE — G8417 CALC BMI ABV UP PARAM F/U: HCPCS | Performed by: PSYCHIATRY & NEUROLOGY

## 2017-11-16 PROCEDURE — 4040F PNEUMOC VAC/ADMIN/RCVD: CPT | Performed by: PSYCHIATRY & NEUROLOGY

## 2017-11-16 PROCEDURE — G8598 ASA/ANTIPLAT THER USED: HCPCS | Performed by: PSYCHIATRY & NEUROLOGY

## 2017-11-16 NOTE — PROGRESS NOTES
spine was normal  MRI since thoracic spine showed arthritis but no evidence of spinal cord impingement  B12 level was borderline low at 330  Serum copper level was normal    Plan :  Discussed with patient  Reviewed MRI images  Recommended sublingual B12 supplementation  Recommended that we repeat an MRI brain in 6 months time to follow-up on the ventricular size  Time spent in the care of this patient today including lengthy discussions with patient as well as reviewing radiology images was 25 minutes              Please note a portion of  this chart was generated using dragon dictation software. Although every effort was made to ensure the accuracy of this automated transcription, some errors in transcription may have occurred.

## 2017-11-16 NOTE — PATIENT INSTRUCTIONS
Please call with any questions or concerns:   [facility]  @ [phone]. LAB RESULTS:  Please obtain any labs or diagnostic tests as discussed today. You may call the office to check the results. Please allow  3 to 7 days for us to get these results. MEDICATION LIST:  Please bring an accurate list of your medications to every visit. APPOINTMENT CONFIRMATION:  We will call you the day before your scheduled appointment to confirm. If we are unable to reach you, you MUST call back by the end of the day to confirm the appointment or we may be forced to cancel.

## 2017-11-29 NOTE — COMMUNICATION BODY
Refill    amLODIPine (NORVASC) 5 MG tablet Take 1 tablet by mouth daily 90 tablet 3    Coenzyme Q10 (COQ10) 200 MG CAPS Take 1 tablet by mouth daily 30 capsule 11    clopidogrel (PLAVIX) 75 MG tablet TAKE 1 TABLET BY MOUTH DAILY 90 tablet 3    omeprazole (PRILOSEC) 40 MG delayed release capsule Take 40 mg by mouth daily      labetalol (NORMODYNE) 200 MG tablet Take 1 tablet by mouth every 12 hours 180 tablet 3    nitroGLYCERIN (NITROLINGUAL) 0.4 MG/SPRAY 0.4 mg spray Place 1 spray under the tongue every 5 minutes as needed for Chest pain 1 Bottle 2    rosuvastatin (CRESTOR) 10 MG tablet Take 1 tablet by mouth daily 90 tablet 3    HYDROcodone-acetaminophen (NORCO)  MG per tablet Take 1 tablet by mouth every 6 hours as needed for Pain      sildenafil (VIAGRA) 100 MG tablet Take 1 tablet by mouth as needed for Erectile Dysfunction 8 tablet 3    aspirin 81 MG tablet Take 81 mg by mouth daily       No facility-administered encounter medications on file as of 11/8/2017. Allergies:  Review of patient's allergies indicates no known allergies. Review of Systems:   · Constitutional: there has been no unanticipated weight loss. Theres been change in energy level, none in sleep pattern    · Eyes: No visual changes or diplopia. No scleral icterus. · ENT: No Headaches,  or vertigo. No mouth sores or sore throat. complains of hearing loss  · Cardiovascular: Reviewed in HPI  · Respiratory: No cough or wheezing, no sputum production. No hematemesis. · Gastrointestinal: No abdominal pain, appetite loss, blood in stools. No change in bowel or bladder habits. · Genitourinary: No dysuria, trouble voiding, or hematuria. · Musculoskeletal:  No gait disturbance, weakness--complain of back pain  · Integumentary: No rash or pruritis. · Neurological: No headache, diplopia, change in muscle strength, numbness or tingling.  No change in gait, balance, coordination, mood, affect, memory, mentation, 1.   Chest pain-resolved--Fatigue--? Anginal equivalent--Now resolved--? Secondary to uncontolled HTN    2.    CAD         9/12/14  Cardiac cath--80% Cx lesion between 2 patent stents. 2.75 X 15 mm ISELA with no residual         Patent stent in RCA Mild inferior wall hypokinesia with LVEF = 50-55%         1/22/16 myoview negative          3.    htn--Poor control --Suggest increase Amlodipine 10 mg/day and losartan 50 mg/day    4.    hchol--8/11  LDL 48  On crestor 10 mg daily--    5. Occlusion and stenosis of carotid arteries--1/15 16-49% bilaterally--will follow up with this after chest pain evaluated--Reviewed stable --Yearly F/U    6. AS -Mild stenosis--Follow    7. Memory Loss--Neuro appt scheduled. Plan:  CPT-Restart Co Q10  Norvasc 10 mg/day--Losartan 50mg/day. F/U in 1 month--Titrate Losartan for sub-optimal control      DAX Day M.D., [facility] [place].

## 2017-12-15 ENCOUNTER — HOSPITAL ENCOUNTER (OUTPATIENT)
Dept: MRI IMAGING | Age: 72
Discharge: HOME | End: 2017-12-15
Attending: INTERNAL MEDICINE | Admitting: INTERNAL MEDICINE

## 2017-12-15 DIAGNOSIS — M54.16 RADICULOPATHY OF LUMBAR REGION: ICD-10-CM

## 2017-12-15 DIAGNOSIS — M54.16 LUMBAR RADICULOPATHY: ICD-10-CM

## 2017-12-20 ENCOUNTER — OFFICE VISIT (OUTPATIENT)
Dept: CARDIOLOGY CLINIC | Age: 72
End: 2017-12-20

## 2017-12-20 VITALS
OXYGEN SATURATION: 94 % | HEIGHT: 72 IN | WEIGHT: 202.8 LBS | HEART RATE: 64 BPM | SYSTOLIC BLOOD PRESSURE: 144 MMHG | BODY MASS INDEX: 27.47 KG/M2 | DIASTOLIC BLOOD PRESSURE: 86 MMHG

## 2017-12-20 DIAGNOSIS — I10 ESSENTIAL HYPERTENSION, BENIGN: Chronic | ICD-10-CM

## 2017-12-20 DIAGNOSIS — E78.2 MIXED HYPERLIPIDEMIA: Chronic | ICD-10-CM

## 2017-12-20 DIAGNOSIS — I25.10 ATHEROSCLEROSIS OF NATIVE CORONARY ARTERY OF NATIVE HEART WITHOUT ANGINA PECTORIS: Primary | Chronic | ICD-10-CM

## 2017-12-20 DIAGNOSIS — I65.23 BILATERAL CAROTID ARTERY STENOSIS: ICD-10-CM

## 2017-12-20 PROCEDURE — 3017F COLORECTAL CA SCREEN DOC REV: CPT | Performed by: INTERNAL MEDICINE

## 2017-12-20 PROCEDURE — 1036F TOBACCO NON-USER: CPT | Performed by: INTERNAL MEDICINE

## 2017-12-20 PROCEDURE — G8598 ASA/ANTIPLAT THER USED: HCPCS | Performed by: INTERNAL MEDICINE

## 2017-12-20 PROCEDURE — G8484 FLU IMMUNIZE NO ADMIN: HCPCS | Performed by: INTERNAL MEDICINE

## 2017-12-20 PROCEDURE — G8427 DOCREV CUR MEDS BY ELIG CLIN: HCPCS | Performed by: INTERNAL MEDICINE

## 2017-12-20 PROCEDURE — 99213 OFFICE O/P EST LOW 20 MIN: CPT | Performed by: INTERNAL MEDICINE

## 2017-12-20 PROCEDURE — 1123F ACP DISCUSS/DSCN MKR DOCD: CPT | Performed by: INTERNAL MEDICINE

## 2017-12-20 PROCEDURE — G8417 CALC BMI ABV UP PARAM F/U: HCPCS | Performed by: INTERNAL MEDICINE

## 2017-12-20 PROCEDURE — 4040F PNEUMOC VAC/ADMIN/RCVD: CPT | Performed by: INTERNAL MEDICINE

## 2017-12-20 NOTE — PROGRESS NOTES
[facility]   Cardiac Follow up    Referring Provider:  Ephraim Santana     Chief Complaint   Patient presents with    Coronary Artery Disease     no cardiac complaints at this time    Hypertension    Hyperlipidemia    1 Month Follow-Up    Other     enlarged spleen        History of Present Illness:  Mr. Naomie Amos is a 70year old gentleman here today with complaints of back/chest pain that reminds him of how he felt with previous stenting   He has a history of CAD, hypertension, hyperlipidemia. Has untreated sleep apnea, states he cant wear the CPAP due to claustrophobia. In September 2014 , he had a ISELA to an 80% Circ lesion between 2 patent stents. November of 2016 he underwent stenting of the distal RCA and Mid LAD with complete resolution of his sx. He denies chest discomfort though has noted increased fatigue and some muscle aching which improved with coQ 10. Major issue is that he has significant reduction in energy. Stress test showed a small fixed defect without ischemia. Echo showed Mild AS with normal EF. /105, 201/114, 173/91, 140/82. Repeat today is 200/96 . He still has problems with short term memory loss. His BP is better controlled on current herapy and has noted improved BP Control. Denies chest discomfort, SOB, change in exercise tolerance, palpitations or syncope. He has very low energy and undergoes a CT tomorrow to evaluate enlarged spleen. Past Medical History:   has a past medical history of Acute MI; CAD (coronary artery disease); Hyperlipidemia; Hypertension; and Type II or unspecified type diabetes mellitus without mention of complication, not stated as uncontrolled. Surgical History:   has a past surgical history that includes Coronary angioplasty with stent (2000, 2/24/2012); Hand surgery (5/09); and ESOPHAGOSCOPY. Social History:   reports that he quit smoking about 35 years ago. He smoked 0.00 packs per day for 20.00 years.  He has never used smokeless tobacco. He reports that he does not drink alcohol or use drugs. Family History:  family history includes Heart Disease in his father, maternal grandfather, and paternal grandfather; High Blood Pressure in his father; High Cholesterol in his father. Home Medications:  Outpatient Encounter Prescriptions as of 12/20/2017   Medication Sig Dispense Refill    rosuvastatin (CRESTOR) 10 MG tablet Take 1 tablet by mouth daily 90 tablet 3    amLODIPine (NORVASC) 10 MG tablet Take 1 tablet by mouth daily 90 tablet 3    losartan (COZAAR) 50 MG tablet Take 1 tablet by mouth daily 90 tablet 3    Coenzyme Q10 (COQ10) 200 MG CAPS Take 1 tablet by mouth daily 30 capsule 11    clopidogrel (PLAVIX) 75 MG tablet TAKE 1 TABLET BY MOUTH DAILY 90 tablet 3    omeprazole (PRILOSEC) 40 MG delayed release capsule Take 40 mg by mouth daily      labetalol (NORMODYNE) 200 MG tablet Take 1 tablet by mouth every 12 hours 180 tablet 3    nitroGLYCERIN (NITROLINGUAL) 0.4 MG/SPRAY 0.4 mg spray Place 1 spray under the tongue every 5 minutes as needed for Chest pain 1 Bottle 2    HYDROcodone-acetaminophen (NORCO)  MG per tablet Take 1 tablet by mouth every 6 hours as needed for Pain      sildenafil (VIAGRA) 100 MG tablet Take 1 tablet by mouth as needed for Erectile Dysfunction 8 tablet 3    aspirin 81 MG tablet Take 81 mg by mouth daily       No facility-administered encounter medications on file as of 12/20/2017. Allergies:  Review of patient's allergies indicates no known allergies. Review of Systems:   · Constitutional: there has been no unanticipated weight loss. Theres been change in energy level, none in sleep pattern    · Eyes: No visual changes or diplopia. No scleral icterus. · ENT: No Headaches,  or vertigo. No mouth sores or sore throat. complains of hearing loss  · Cardiovascular: Reviewed in HPI  · Respiratory: No cough or wheezing, no sputum production. No hematemesis. · Gastrointestinal: No abdominal pain, appetite loss, blood in stools. No change in bowel or bladder habits. · Genitourinary: No dysuria, trouble voiding, or hematuria. · Musculoskeletal:  No gait disturbance, weakness--complain of back pain  · Integumentary: No rash or pruritis. · Neurological: No headache, diplopia, change in muscle strength, numbness or tingling. No change in gait, balance, coordination, mood, affect, memory, mentation, behavior. · Psychiatric: No anxiety, no depression. · Endocrine: No malaise, fatigue or temperature intolerance. No excessive thirst, fluid intake, or urination. No tremor. · Hematologic/Lymphatic: No abnormal bruising or bleeding, blood clots or swollen lymph nodes. Physical Examination:    Vitals:    11/08/17 1023   BP: 129/76--200/96   Pulse: 66        Constitutional and General Appearance: NAD, healthy-appearing  Respiratory:  · Normal excursion and expansion without use of accessory muscles  · Resp Auscultation: Normal breath sounds without dullness  Cardiovascular:  · The apical impulses not displaced   · Heart tones are crisp and normal  · Cervical veins are not engorgedbruit right sided  · The carotid upstroke is normal in amplitude and contour without delay or bruit  · 2/6 KHANH  · Peripheral pulses are symmetrical and full   -CWT  · There is no clubbing, cyanosis of the extremities. · No edema  · Femoral Arteries: 2+ and equal  · Pedal Pulses: 2+ and equal   Abdomen:  · No masses or tenderness  · Liver/Spleen: No Abnormalities Noted  Neurological/Psychiatric:  · Alert and oriented in all spheres  · Moves all extremities well  · Exhibits normal gait balance and coordination  · No abnormalities of mood, affect, memory, mentation, or behavior are noted  Echo 1/2015:  Normal left ventricle size and systolic function with an estimated ejection fraction of 60%. No regional wall motion abnormalities are seen. There is moderate concentric left ventricular hypertrophy.

## 2017-12-20 NOTE — LETTER
[address] Cardiology [facility]  [address] [address] [address] [address]  Phone: [phone]  Fax: [phone]    Ariella Lozoya MD        December 21, 2017     [address] [place] [address]  [address] [facility]    Patient: Dominguez Garcia  MR Number: [id number]  YOB: 1945  Date of Visit: 12/20/2017    Dear Dr. Lionel Abraham:     Below are the relevant portions of my assessment and plan of care. [facility]   Card[facility] Follow up    Referring Provider:  Lionel Abraham     Chief Complaint   Patient presents with    Coronary Artery Disease     no cardiac complaints at this time    Hypertension    Hyperlipidemia    1 Month Follow-Up    Other     enlarged spleen        History of Present Illness:  Mr. Nancy Fung is a 70year old gentleman here today with complaints of back/chest pain that reminds him of how he felt with previous stenting   He has a history of CAD, hypertension, hyperlipidemia. Has untreated sleep apnea, states he cant wear the CPAP due to claustrophobia. In September 2014 , he had a ISELA to an 80% Circ lesion between 2 patent stents. November of 2016 he underwent stenting of the distal RCA and Mid LAD with complete resolution of his sx. He denies chest discomfort though has noted increased fatigue and some muscle aching which improved with coQ 10. Major issue is that he has significant reduction in energy. Stress test showed a small fixed defect without ischemia. Echo showed Mild AS with normal EF. /105, 201/114, 173/91, 140/82. Repeat today is 200/96 . He still has problems with short term memory loss. His BP is better controlled on current herapy and has noted improved BP Control. Denies chest discomfort, SOB, change in exercise tolerance, palpitations or syncope. He has very low energy and undergoes a CT tomorrow to evaluate enlarged spleen.       Past Medical History: has a past medical history of Acute MI; CAD (coronary artery disease); Hyperlipidemia; Hypertension; and Type II or unspecified type diabetes mellitus without mention of complication, not stated as uncontrolled. Surgical History:   has a past surgical history that includes Coronary angioplasty with stent (2000, 2/24/2012); Hand surgery (5/09); and ESOPHAGOSCOPY. Social History:   reports that he quit smoking about 35 years ago. He smoked 0.00 packs per day for 20.00 years. He has never used smokeless tobacco. He reports that he does not drink alcohol or use drugs. Family History:  family history includes Heart Disease in his father, maternal grandfather, and paternal grandfather; High Blood Pressure in his father; High Cholesterol in his father. Home Medications:  Outpatient Encounter Prescriptions as of 12/20/2017   Medication Sig Dispense Refill    rosuvastatin (CRESTOR) 10 MG tablet Take 1 tablet by mouth daily 90 tablet 3    amLODIPine (NORVASC) 10 MG tablet Take 1 tablet by mouth daily 90 tablet 3    losartan (COZAAR) 50 MG tablet Take 1 tablet by mouth daily 90 tablet 3    Coenzyme Q10 (COQ10) 200 MG CAPS Take 1 tablet by mouth daily 30 capsule 11    clopidogrel (PLAVIX) 75 MG tablet TAKE 1 TABLET BY MOUTH DAILY 90 tablet 3    omeprazole (PRILOSEC) 40 MG delayed release capsule Take 40 mg by mouth daily      labetalol (NORMODYNE) 200 MG tablet Take 1 tablet by mouth every 12 hours 180 tablet 3    nitroGLYCERIN (NITROLINGUAL) 0.4 MG/SPRAY 0.4 mg spray Place 1 spray under the tongue every 5 minutes as needed for Chest pain 1 Bottle 2    HYDROcodone-acetaminophen (NORCO)  MG per tablet Take 1 tablet by mouth every 6 hours as needed for Pain      sildenafil (VIAGRA) 100 MG tablet Take 1 tablet by mouth as needed for Erectile Dysfunction 8 tablet 3    aspirin 81 MG tablet Take 81 mg by mouth daily       No facility-administered encounter medications on file as of 12/20/2017. · No masses or tenderness  · Liver/Spleen: No Abnormalities Noted  Neurological/Psychiatric:  · Alert and oriented in all spheres  · Moves all extremities well  · Exhibits normal gait balance and coordination  · No abnormalities of mood, affect, memory, mentation, or behavior are noted  Echo 1/2015:  Normal left ventricle size and systolic function with an estimated ejection fraction of 60%. No regional wall motion abnormalities are seen. There is moderate concentric left ventricular hypertrophy. Normal diastolic filling pattern for age. Mild mitral regurgitation is present. The aortic valve appears sclerotic but opens well. There is mild tricuspid regurgitation with RVSP estimated at 34 mmHg. 1/22/16 myoview--  Summary   -Small-moderate sized inferior fixed defect consistent with infarction in   the territory of the mid and distal RCA . -Normal LV function.   -There is no evidence of stress induced ischemia. Assessment:   1. Chest pain-resolved--Fatigue--? Anginal equivalent--Now resolved--? Secondary to uncontolled HTN    2.    CAD         9/12/14  Cardiac cath--80% Cx lesion between 2 patent stents. 2.75 X 15 mm ISELA with no residual         Patent stent in RCA Mild inferior wall hypokinesia with LVEF = 50-55%         1/22/16 myoview negative          3.    htn--Poor control --Suggest increase Amlodipine 10 mg/day and losartan 50 mg/day    4.    hchol--8/11  LDL 48  On crestor 10 mg daily--    5. Occlusion and stenosis of carotid arteries--1/15 16-49% bilaterally--will follow up with this after chest pain evaluated--Reviewed stable --Yearly F/U    6. AS -Mild stenosis--Follow    7. Memory Loss--Neuro appt scheduled. Plan:  CPT-Continue Co Q10  200-400/day  Norvasc 10 mg/day--Losartan 50mg/day. F/U in 4 months. Bessy Vasquez M.D., [facility] [place]. If you have questions, please do not hesitate to call me. I look forward to following Missael Laws along with you.     Sincerely,

## 2017-12-21 ENCOUNTER — HOSPITAL ENCOUNTER (OUTPATIENT)
Dept: CT IMAGING | Age: 72
Discharge: HOME | End: 2017-12-21
Attending: INTERNAL MEDICINE | Admitting: INTERNAL MEDICINE

## 2017-12-21 DIAGNOSIS — R16.1 ENLARGEMENT OF SPLEEN: ICD-10-CM

## 2017-12-21 DIAGNOSIS — R16.1 SPLENOMEGALY, NOT ELSEWHERE CLASSIFIED: ICD-10-CM

## 2017-12-21 NOTE — COMMUNICATION BODY
[address]   Cardiac Follow up    Referring Provider:  Jerrod Magana     Chief Complaint   Patient presents with    Coronary Artery Disease     no cardiac complaints at this time    Hypertension    Hyperlipidemia    1 Month Follow-Up    Other     enlarged spleen        History of Present Illness:  Mr. Iona Morales is a 70year old gentleman here today with complaints of back/chest pain that reminds him of how he felt with previous stenting   He has a history of CAD, hypertension, hyperlipidemia. Has untreated sleep apnea, states he cant wear the CPAP due to claustrophobia. In September 2014 , he had a ISELA to an 80% Circ lesion between 2 patent stents. November of 2016 he underwent stenting of the distal RCA and Mid LAD with complete resolution of his sx. He denies chest discomfort though has noted increased fatigue and some muscle aching which improved with coQ 10. Major issue is that he has significant reduction in energy. Stress test showed a small fixed defect without ischemia. Echo showed Mild AS with normal EF. /105, 201/114, 173/91, 140/82. Repeat today is 200/96 . He still has problems with short term memory loss. His BP is better controlled on current herapy and has noted improved BP Control. Denies chest discomfort, SOB, change in exercise tolerance, palpitations or syncope. He has very low energy and undergoes a CT tomorrow to evaluate enlarged spleen. Past Medical History:   has a past medical history of Acute MI; CAD (coronary artery disease); Hyperlipidemia; Hypertension; and Type II or unspecified type diabetes mellitus without mention of complication, not stated as uncontrolled. Surgical History:   has a past surgical history that includes Coronary angioplasty with stent (2000, 2/24/2012); Hand surgery (5/09); and ESOPHAGOSCOPY. Social History:   reports that he quit smoking about 35 years ago. He smoked 0.00 packs per day for 20.00 years.  He has Normal diastolic filling pattern for age. Mild mitral regurgitation is present. The aortic valve appears sclerotic but opens well. There is mild tricuspid regurgitation with RVSP estimated at 34 mmHg. 1/22/16 myoview--  Summary   -Small-moderate sized inferior fixed defect consistent with infarction in   the territory of the mid and distal RCA . -Normal LV function.   -There is no evidence of stress induced ischemia. Assessment:   1. Chest pain-resolved--Fatigue--? Anginal equivalent--Now resolved--? Secondary to uncontolled HTN    2.    CAD         9/12/14  Cardiac cath--80% Cx lesion between 2 patent stents. 2.75 X 15 mm ISELA with no residual         Patent stent in RCA Mild inferior wall hypokinesia with LVEF = 50-55%         1/22/16 myoview negative          3.    htn--Poor control --Suggest increase Amlodipine 10 mg/day and losartan 50 mg/day    4.    hchol--8/11  LDL 48  On crestor 10 mg daily--    5. Occlusion and stenosis of carotid arteries--1/15 16-49% bilaterally--will follow up with this after chest pain evaluated--Reviewed stable --Yearly F/U    6. AS -Mild stenosis--Follow    7. Memory Loss--Neuro appt scheduled. Plan:  CPT-Continue Co Q10  200-400/day  Norvasc 10 mg/day--Losartan 50mg/day. F/U in 4 months. Triston Garrison M.D., [facility] [place].

## 2017-12-26 ENCOUNTER — HOSPITAL ENCOUNTER (OUTPATIENT)
Dept: OTHER | Age: 72
Discharge: HOME | End: 2017-12-26
Attending: INTERNAL MEDICINE | Admitting: INTERNAL MEDICINE

## 2017-12-26 LAB
BASOPHILS ABSOLUTE: 0 K/UL (ref 0–0.2)
BASOPHILS RELATIVE PERCENT: 0.3 %
EOSINOPHILS ABSOLUTE: 0.1 K/UL (ref 0–0.6)
EOSINOPHILS RELATIVE PERCENT: 1.6 %
FOLATE: 11.18 NG/ML (ref 4.78–24.2)
HCT VFR BLD CALC: 40.9 % (ref 40.5–52.5)
HEMOGLOBIN: 14.1 G/DL (ref 13.5–17.5)
LYMPHOCYTES ABSOLUTE: 0.7 K/UL (ref 1–5.1)
LYMPHOCYTES RELATIVE PERCENT: 14 %
MCH RBC QN AUTO: 29.8 PG (ref 26–34)
MCHC RBC AUTO-ENTMCNC: 34.5 G/DL (ref 31–36)
MCV RBC AUTO: 86.1 FL (ref 80–100)
MONOCYTES ABSOLUTE: 0.5 K/UL (ref 0–1.3)
MONOCYTES RELATIVE PERCENT: 9.5 %
NEUTROPHILS ABSOLUTE: 3.8 K/UL (ref 1.7–7.7)
NEUTROPHILS RELATIVE PERCENT: 74.6 %
PDW BLD-RTO: 13.9 % (ref 12.4–15.4)
PLATELET # BLD: 141 K/UL (ref 135–450)
PMV BLD AUTO: 8.6 FL (ref 5–10.5)
RBC # BLD: 4.75 M/UL (ref 4.2–5.9)
SEDIMENTATION RATE, ERYTHROCYTE: 10 MM/HR (ref 0–20)
T4 FREE: 1.1 NG/DL (ref 0.9–1.8)
TSH SERPL DL<=0.05 MIU/L-ACNC: 3.04 UIU/ML (ref 0.27–4.2)
VITAMIN B-12: 911 PG/ML (ref 211–911)
WBC # BLD: 5 K/UL (ref 4–11)

## 2017-12-27 LAB
ALBUMIN SERPL-MCNC: 3.6 G/DL (ref 3.1–4.9)
ALPHA-1-GLOBULIN: 0.2 G/DL (ref 0.2–0.4)
ALPHA-2-GLOBULIN: 0.5 G/DL (ref 0.4–1.1)
ANA INTERPRETATION: NORMAL
ANTI-NUCLEAR ANTIBODY (ANA): NEGATIVE
BETA GLOBULIN: 0.9 G/DL (ref 0.9–1.6)
GAMMA GLOBULIN: 1 G/DL (ref 0.6–1.8)
SPE/IFE INTERPRETATION: NORMAL
TOTAL PROTEIN: 6.2 G/DL (ref 6.4–8.2)

## 2018-01-09 NOTE — TELEPHONE ENCOUNTER
Received a medication advisory from [employer] that pt may not be taking his labetalol and amlodipine. Wanted to clarify this.

## 2018-02-14 ENCOUNTER — HOSPITAL ENCOUNTER (OUTPATIENT)
Dept: OTHER | Age: 73
Discharge: HOME | End: 2018-02-14
Attending: NEUROLOGICAL SURGERY | Admitting: NEUROLOGICAL SURGERY

## 2018-02-14 DIAGNOSIS — M54.89 MIDLINE BACK PAIN, UNSPECIFIED BACK LOCATION, UNSPECIFIED CHRONICITY: ICD-10-CM

## 2018-02-28 ENCOUNTER — TELEPHONE (OUTPATIENT)
Dept: CARDIOLOGY CLINIC | Age: 73
End: 2018-02-28

## 2018-02-28 NOTE — TELEPHONE ENCOUNTER
Assessment:   1. Chest pain-resolved--Fatigue--? Anginal equivalent--Now resolved--? Secondary to uncontolled HTN     2. CAD         9/12/14  Cardiac cath--80% Cx lesion between 2 patent stents. 2.75 X 15 mm ISELA with no residual         Patent stent in RCA Mild inferior wall hypokinesia with LVEF = 50-55%         1/22/16 myoview negative          3.    htn--Poor control --Suggest increase Amlodipine 10 mg/day and losartan 50 mg/day     4.    hchol--8/11  LDL 48  On crestor 10 mg daily--     5.    Occlusion and stenosis of carotid arteries--1/15 16-49% bilaterally--will follow up with this after chest pain evaluated--Reviewed stable --Yearly F/U     6. AS -Mild stenosis--Follow     7. Memory Loss--Neuro appt scheduled.        Plan:  CPT-Continue Co Q10  200-400/day  Norvasc 10 mg/day--Losartan 50mg/day. F/U in 4 months.        DAX Moss M.D., [facility].

## 2018-03-01 ENCOUNTER — TELEPHONE (OUTPATIENT)
Dept: CARDIOLOGY CLINIC | Age: 73
End: 2018-03-01

## 2018-03-29 ENCOUNTER — TELEPHONE (OUTPATIENT)
Dept: CARDIOLOGY CLINIC | Age: 73
End: 2018-03-29

## 2018-03-29 NOTE — TELEPHONE ENCOUNTER
Wife calling to adv that pt PCP would like a copy of Cardiac clx faxed to [phone].  Please call wife to adv once done Thank you

## 2018-05-01 DIAGNOSIS — I10 ESSENTIAL HYPERTENSION, BENIGN: ICD-10-CM

## 2018-05-01 DIAGNOSIS — R07.9 CHEST PAIN, UNSPECIFIED TYPE: ICD-10-CM

## 2018-05-01 DIAGNOSIS — I25.10 ATHEROSCLEROSIS OF NATIVE CORONARY ARTERY OF NATIVE HEART, ANGINA PRESENCE UNSPECIFIED: ICD-10-CM

## 2018-05-01 DIAGNOSIS — R53.83 FATIGUE, UNSPECIFIED TYPE: ICD-10-CM

## 2018-05-02 RX ORDER — LABETALOL 200 MG/1
200 TABLET, FILM COATED ORAL EVERY 12 HOURS SCHEDULED
Qty: 180 TABLET | Refills: 0 | Status: SHIPPED | OUTPATIENT
Start: 2018-05-02 | End: 2018-07-03 | Stop reason: SDUPTHER

## 2018-05-09 ENCOUNTER — OFFICE VISIT (OUTPATIENT)
Dept: NEUROLOGY | Age: 73
End: 2018-05-09

## 2018-05-09 ENCOUNTER — HOSPITAL ENCOUNTER (OUTPATIENT)
Dept: OTHER | Age: 73
Discharge: HOME | End: 2018-05-09
Attending: NEUROLOGICAL SURGERY | Admitting: NEUROLOGICAL SURGERY

## 2018-05-09 VITALS
DIASTOLIC BLOOD PRESSURE: 81 MMHG | SYSTOLIC BLOOD PRESSURE: 151 MMHG | HEART RATE: 63 BPM | WEIGHT: 194.4 LBS | BODY MASS INDEX: 26.33 KG/M2 | HEIGHT: 72 IN

## 2018-05-09 DIAGNOSIS — G93.89 CEREBRAL VENTRICULOMEGALY: ICD-10-CM

## 2018-05-09 DIAGNOSIS — R27.0 ATAXIA: Primary | ICD-10-CM

## 2018-05-09 DIAGNOSIS — M48.062 LUMBAR STENOSIS WITH NEUROGENIC CLAUDICATION: ICD-10-CM

## 2018-05-09 PROCEDURE — 1123F ACP DISCUSS/DSCN MKR DOCD: CPT | Performed by: PSYCHIATRY & NEUROLOGY

## 2018-05-09 PROCEDURE — G8427 DOCREV CUR MEDS BY ELIG CLIN: HCPCS | Performed by: PSYCHIATRY & NEUROLOGY

## 2018-05-09 PROCEDURE — 3017F COLORECTAL CA SCREEN DOC REV: CPT | Performed by: PSYCHIATRY & NEUROLOGY

## 2018-05-09 PROCEDURE — G8598 ASA/ANTIPLAT THER USED: HCPCS | Performed by: PSYCHIATRY & NEUROLOGY

## 2018-05-09 PROCEDURE — 99213 OFFICE O/P EST LOW 20 MIN: CPT | Performed by: PSYCHIATRY & NEUROLOGY

## 2018-05-09 PROCEDURE — 4040F PNEUMOC VAC/ADMIN/RCVD: CPT | Performed by: PSYCHIATRY & NEUROLOGY

## 2018-05-09 PROCEDURE — G8417 CALC BMI ABV UP PARAM F/U: HCPCS | Performed by: PSYCHIATRY & NEUROLOGY

## 2018-05-09 PROCEDURE — 1036F TOBACCO NON-USER: CPT | Performed by: PSYCHIATRY & NEUROLOGY

## 2018-06-13 ENCOUNTER — HOSPITAL ENCOUNTER (OUTPATIENT)
Dept: OTHER | Age: 73
Discharge: HOME | End: 2018-06-13
Attending: NEUROLOGICAL SURGERY | Admitting: NEUROLOGICAL SURGERY

## 2018-06-13 DIAGNOSIS — Z48.89 ENCOUNTER FOR OTHER SPECIFIED SURGICAL AFTERCARE (CODE): ICD-10-CM

## 2018-07-03 ENCOUNTER — OFFICE VISIT (OUTPATIENT)
Dept: CARDIOLOGY CLINIC | Age: 73
End: 2018-07-03

## 2018-07-03 VITALS
SYSTOLIC BLOOD PRESSURE: 134 MMHG | HEART RATE: 58 BPM | HEIGHT: 72 IN | DIASTOLIC BLOOD PRESSURE: 84 MMHG | WEIGHT: 199 LBS | OXYGEN SATURATION: 98 % | BODY MASS INDEX: 26.95 KG/M2

## 2018-07-03 DIAGNOSIS — I25.10 CORONARY ARTERY DISEASE DUE TO LIPID RICH PLAQUE: Chronic | ICD-10-CM

## 2018-07-03 DIAGNOSIS — I65.23 OCCLUSION AND STENOSIS OF BILATERAL CAROTID ARTERIES: ICD-10-CM

## 2018-07-03 DIAGNOSIS — E78.2 MIXED HYPERLIPIDEMIA: Chronic | ICD-10-CM

## 2018-07-03 DIAGNOSIS — I25.83 CORONARY ARTERY DISEASE DUE TO LIPID RICH PLAQUE: Chronic | ICD-10-CM

## 2018-07-03 DIAGNOSIS — R07.9 CHEST PAIN, UNSPECIFIED TYPE: ICD-10-CM

## 2018-07-03 DIAGNOSIS — I25.10 ATHEROSCLEROSIS OF NATIVE CORONARY ARTERY OF NATIVE HEART WITHOUT ANGINA PECTORIS: Chronic | ICD-10-CM

## 2018-07-03 DIAGNOSIS — I25.10 ATHEROSCLEROSIS OF NATIVE CORONARY ARTERY OF NATIVE HEART, ANGINA PRESENCE UNSPECIFIED: ICD-10-CM

## 2018-07-03 DIAGNOSIS — I10 ESSENTIAL HYPERTENSION, BENIGN: Primary | Chronic | ICD-10-CM

## 2018-07-03 DIAGNOSIS — R53.83 FATIGUE, UNSPECIFIED TYPE: ICD-10-CM

## 2018-07-03 PROCEDURE — 4040F PNEUMOC VAC/ADMIN/RCVD: CPT | Performed by: INTERNAL MEDICINE

## 2018-07-03 PROCEDURE — G8417 CALC BMI ABV UP PARAM F/U: HCPCS | Performed by: INTERNAL MEDICINE

## 2018-07-03 PROCEDURE — 93000 ELECTROCARDIOGRAM COMPLETE: CPT | Performed by: INTERNAL MEDICINE

## 2018-07-03 PROCEDURE — 1036F TOBACCO NON-USER: CPT | Performed by: INTERNAL MEDICINE

## 2018-07-03 PROCEDURE — 1123F ACP DISCUSS/DSCN MKR DOCD: CPT | Performed by: INTERNAL MEDICINE

## 2018-07-03 PROCEDURE — 99214 OFFICE O/P EST MOD 30 MIN: CPT | Performed by: INTERNAL MEDICINE

## 2018-07-03 PROCEDURE — G8598 ASA/ANTIPLAT THER USED: HCPCS | Performed by: INTERNAL MEDICINE

## 2018-07-03 PROCEDURE — G8427 DOCREV CUR MEDS BY ELIG CLIN: HCPCS | Performed by: INTERNAL MEDICINE

## 2018-07-03 PROCEDURE — 3017F COLORECTAL CA SCREEN DOC REV: CPT | Performed by: INTERNAL MEDICINE

## 2018-07-03 RX ORDER — LABETALOL 100 MG/1
100 TABLET, FILM COATED ORAL EVERY 12 HOURS SCHEDULED
Qty: 180 TABLET | Refills: 3 | Status: SHIPPED | OUTPATIENT
Start: 2018-07-03 | End: 2018-07-05 | Stop reason: SDUPTHER

## 2018-07-03 NOTE — LETTER
· Cardiovascular: Reviewed in HPI  · Respiratory: No cough or wheezing, no sputum production. No hematemesis. · Gastrointestinal: No abdominal pain, appetite loss, blood in stools. No change in bowel or bladder habits. · Genitourinary: No dysuria, trouble voiding, or hematuria. · Musculoskeletal:  No gait disturbance, weakness--complain of back pain  · Integumentary: No rash or pruritis. · Neurological: No headache, diplopia, change in muscle strength, numbness or tingling. No change in gait, balance, coordination, mood, affect, memory, mentation, behavior. · Psychiatric: No anxiety, no depression. · Endocrine: No malaise, fatigue or temperature intolerance. No excessive thirst, fluid intake, or urination. No tremor. · Hematologic/Lymphatic: No abnormal bruising or bleeding, blood clots or swollen lymph nodes. Physical Examination:    Vitals:    11/08/17 1023   BP: 129/76--200/96   Pulse: 66        Constitutional and General Appearance:   . NAD   SKIN:  .     Warm and dry  EYES:    .     EOMI  Neck:   . Normal carotid contour  Respiratory:  · Normal excursion and expansion without use of accessory muscles  · Resp Auscultation: Normal breath sounds without dullness  Cardiovascular:  · The apical impulses not displaced  -CWT  · Heart tones are crisp and normal  · Cervical veins are not engorged  · Normal N6O4, No S3, Systolic Murmur  · Peripheral pulses are symmetrical and full  Extremities:  · There is no clubbing, cyanosis of the extremities.   · No edema  · Femoral Arteries: 2+ and equal  · Pedal Pulses: 2+ and equal   Abdomen:  · No masses or tenderness  · Liver/Spleen: No Abnormalities Noted  Neurological/Psychiatric:  · Alert and oriented in all spheres  · Moves all extremities well  · Exhibits normal gait balance and coordination  · No abnormalities of mood, affect, memory      Echo 1/2015:  Normal left ventricle size and systolic function with an estimated property were performed in my presence and at my direction. Furthermore, the content and accuracy of this note have been reviewed by me Janice Mccurdy MD). If you have questions, please do not hesitate to call me. I look forward to following Kathryn Haider along with you.     Sincerely,        Arun Lewis MD

## 2018-07-03 NOTE — COMMUNICATION BODY
[address]   Cardiac Follow up    Referring Provider:  Princess Banuelos     Chief Complaint   Patient presents with    Coronary Artery Disease    Palpitations    Hypertension        History of Present Illness:  Mr. Miranda Estevez is a 67 y.o.d gentleman here today as a follow up for  CAD, htn,hchol. He has  Untreated TIFFANY. In September 2014 , he had a ISELA to an 80% Circ lesion between 2 patent stents. November of 2016 he underwent stenting of the distal RCA and Mid LAD     In April  He had surgery on L4 L5, thinks he has had a slower recovery than he expected. Has  Not restarted any blood thinners since    He has no chest pain,  Sob, palpitations or dizziness. No cardiac complications  During  Surgery/recovery he has noted continued fatigue and does PT weekly. Past Medical History:   has a past medical history of Acute MI; CAD (coronary artery disease); Hyperlipidemia; Hypertension; and Type II or unspecified type diabetes mellitus without mention of complication, not stated as uncontrolled. Surgical History:   has a past surgical history that includes Coronary angioplasty with stent (2000, 2/24/2012); Hand surgery (5/09); and ESOPHAGOSCOPY. Social History:   reports that he quit smoking about 36 years ago. He smoked 0.00 packs per day for 20.00 years. He has never used smokeless tobacco. He reports that he drinks alcohol. He reports that he does not use drugs. Family History:  family history includes Heart Disease in his father, maternal grandfather, and paternal grandfather; High Blood Pressure in his father; High Cholesterol in his father.      Home Medications:  Outpatient Encounter Prescriptions as of 7/3/2018   Medication Sig Dispense Refill    labetalol (NORMODYNE) 100 MG tablet Take 1 tablet by mouth every 12 hours 180 tablet 3    rosuvastatin (CRESTOR) 10 MG tablet Take 1 tablet by mouth daily (Patient taking differently: Take 10 mg by mouth nightly ) 90 tablet 3    amLODIPine (NORVASC) 10 MG tablet Take 1 tablet by mouth daily 90 tablet 3    losartan (COZAAR) 50 MG tablet Take 1 tablet by mouth daily 90 tablet 3    Coenzyme Q10 (COQ10) 200 MG CAPS Take 1 tablet by mouth daily 30 capsule 11    omeprazole (PRILOSEC) 40 MG delayed release capsule Take 40 mg by mouth daily      nitroGLYCERIN (NITROLINGUAL) 0.4 MG/SPRAY 0.4 mg spray Place 1 spray under the tongue every 5 minutes as needed for Chest pain 1 Bottle 2    HYDROcodone-acetaminophen (NORCO)  MG per tablet Take 1 tablet by mouth every 6 hours as needed for Pain      sildenafil (VIAGRA) 100 MG tablet Take 1 tablet by mouth as needed for Erectile Dysfunction 8 tablet 3    [DISCONTINUED] labetalol (NORMODYNE) 200 MG tablet TAKE 1 TABLET BY MOUTH EVERY 12 HOURS 180 tablet 0    clopidogrel (PLAVIX) 75 MG tablet TAKE 1 TABLET BY MOUTH DAILY 90 tablet 3    aspirin 81 MG tablet Take 81 mg by mouth daily       No facility-administered encounter medications on file as of 7/3/2018. Allergies:  Patient has no known allergies. Review of Systems:   · Constitutional: there has been no unanticipated weight loss. Theres been change in energy level, none in sleep pattern    · Eyes: No visual changes or diplopia. No scleral icterus. · ENT: No Headaches,  or vertigo. No mouth sores or sore throat. complains of hearing loss  · Cardiovascular: Reviewed in HPI  · Respiratory: No cough or wheezing, no sputum production. No hematemesis. · Gastrointestinal: No abdominal pain, appetite loss, blood in stools. No change in bowel or bladder habits. · Genitourinary: No dysuria, trouble voiding, or hematuria. · Musculoskeletal:  No gait disturbance, weakness--complain of back pain  · Integumentary: No rash or pruritis. · Neurological: No headache, diplopia, change in muscle strength, numbness or tingling. No change in gait, balance, coordination, mood, affect, memory, mentation, behavior.   · Psychiatric: No anxiety, no depression. · Endocrine: No malaise, fatigue or temperature intolerance. No excessive thirst, fluid intake, or urination. No tremor. · Hematologic/Lymphatic: No abnormal bruising or bleeding, blood clots or swollen lymph nodes. Physical Examination:    Vitals:    11/08/17 1023   BP: 129/76--200/96   Pulse: 66        Constitutional and General Appearance:   . NAD   SKIN:  .     Warm and dry  EYES:    .     EOMI  Neck:   . Normal carotid contour  Respiratory:  · Normal excursion and expansion without use of accessory muscles  · Resp Auscultation: Normal breath sounds without dullness  Cardiovascular:  · The apical impulses not displaced  -CWT  · Heart tones are crisp and normal  · Cervical veins are not engorged  · Normal I1O4, No S3, Systolic Murmur  · Peripheral pulses are symmetrical and full  Extremities:  · There is no clubbing, cyanosis of the extremities. · No edema  · Femoral Arteries: 2+ and equal  · Pedal Pulses: 2+ and equal   Abdomen:  · No masses or tenderness  · Liver/Spleen: No Abnormalities Noted  Neurological/Psychiatric:  · Alert and oriented in all spheres  · Moves all extremities well  · Exhibits normal gait balance and coordination  · No abnormalities of mood, affect, memory      Echo 1/2015:  Normal left ventricle size and systolic function with an estimated ejection fraction of 60%. No regional wall motion abnormalities are seen. There is moderate concentric left ventricular hypertrophy. Normal diastolic filling pattern for age. Mild mitral regurgitation is present. The aortic valve appears sclerotic but opens well. There is mild tricuspid regurgitation with RVSP estimated at 34 mmHg. 2009  abd ultrasound--no  AAA    Assessment:   1    CAD--September 2014 , he had a ISELA to an 80% Circ lesion between 2 patent stents.   November of 2016 he underwent stenting of the distal RCA and Mid LAD  11/17  Small-medium sized inferior fixed defect of moderate intensity consistent    with

## 2018-07-03 NOTE — PROGRESS NOTES
depression. · Endocrine: No malaise, fatigue or temperature intolerance. No excessive thirst, fluid intake, or urination. No tremor. · Hematologic/Lymphatic: No abnormal bruising or bleeding, blood clots or swollen lymph nodes. Physical Examination:    Vitals:    11/08/17 1023   BP: 129/76--200/96   Pulse: 66        Constitutional and General Appearance:   . NAD   SKIN:  .     Warm and dry  EYES:    .     EOMI  Neck:   . Normal carotid contour  Respiratory:  · Normal excursion and expansion without use of accessory muscles  · Resp Auscultation: Normal breath sounds without dullness  Cardiovascular:  · The apical impulses not displaced  -CWT  · Heart tones are crisp and normal  · Cervical veins are not engorged  · Normal B4I5, No S3, Systolic Murmur  · Peripheral pulses are symmetrical and full  Extremities:  · There is no clubbing, cyanosis of the extremities. · No edema  · Femoral Arteries: 2+ and equal  · Pedal Pulses: 2+ and equal   Abdomen:  · No masses or tenderness  · Liver/Spleen: No Abnormalities Noted  Neurological/Psychiatric:  · Alert and oriented in all spheres  · Moves all extremities well  · Exhibits normal gait balance and coordination  · No abnormalities of mood, affect, memory      Echo 1/2015:  Normal left ventricle size and systolic function with an estimated ejection fraction of 60%. No regional wall motion abnormalities are seen. There is moderate concentric left ventricular hypertrophy. Normal diastolic filling pattern for age. Mild mitral regurgitation is present. The aortic valve appears sclerotic but opens well. There is mild tricuspid regurgitation with RVSP estimated at 34 mmHg. 2009  abd ultrasound--no  AAA    Assessment:   1    CAD--September 2014 , he had a ISELA to an 80% Circ lesion between 2 patent stents.   November of 2016 he underwent stenting of the distal RCA and Mid LAD  11/17  Small-medium sized inferior fixed defect of moderate intensity consistent    with infarction in the territory of the RCA .    No ischemia.    Normal LV function. Multiple stents--suggest continue DAPT            2.    Htn--/84 (Site: Left Arm, Position: Sitting, Cuff Size: Medium Adult)   Pulse 58   Ht 6' (1.829 m)   Wt 199 lb (90.3 kg)   SpO2 98%   BMI 26.99 kg/m²       3. Hchol--11/16 tc 134 hdl 24 ldl 81 tri 146    4. Occlusion and stenosis of carotid arteries--1/15 16-49% bilaterally--will follow up with this after chest pain evaluated--Reviewed stable --  10/17  Carotid dopplers--less than 50% bilateral    5. AS -Mild stenosis--Follow    6. Fatigue--Reduce labetalol      Plan:  Restart ASA and plavix if ok with surgery  ECG today  Decrease Labetalol to 100mg po bid with BP cks to review on return  Increase Co Q 400mg/day  OV X 3 months      Jeramie Mandel M.D., [facility]. Scribe Attestation:  Vanda Logan, mari scribing for and in the presence of Caro Camejo MD.   Tammie Frost 07/03/18 10:00 AM   Provider Rekha Felix is working as a scribe for and in the presence of karina Camejo MD). Working as a scribe, Cleopatra Suarez may have prepopulated components of this note with my historical  intellectual property under my direct supervision. Any additions to this intellectual property were performed in my presence and at my direction. Furthermore, the content and accuracy of this note have been reviewed by karina Camejo MD).

## 2018-07-05 ENCOUNTER — TELEPHONE (OUTPATIENT)
Dept: CARDIOLOGY CLINIC | Age: 73
End: 2018-07-05

## 2018-07-05 DIAGNOSIS — I10 ESSENTIAL HYPERTENSION, BENIGN: Chronic | ICD-10-CM

## 2018-07-05 DIAGNOSIS — I25.83 CORONARY ARTERY DISEASE DUE TO LIPID RICH PLAQUE: ICD-10-CM

## 2018-07-05 DIAGNOSIS — R53.83 FATIGUE, UNSPECIFIED TYPE: ICD-10-CM

## 2018-07-05 DIAGNOSIS — I25.10 CORONARY ARTERY DISEASE DUE TO LIPID RICH PLAQUE: ICD-10-CM

## 2018-07-05 DIAGNOSIS — I25.10 ATHEROSCLEROSIS OF NATIVE CORONARY ARTERY OF NATIVE HEART, ANGINA PRESENCE UNSPECIFIED: ICD-10-CM

## 2018-07-05 DIAGNOSIS — I65.23 OCCLUSION AND STENOSIS OF BILATERAL CAROTID ARTERIES: ICD-10-CM

## 2018-07-05 DIAGNOSIS — E78.5 HYPERLIPIDEMIA, UNSPECIFIED HYPERLIPIDEMIA TYPE: ICD-10-CM

## 2018-07-05 DIAGNOSIS — R07.9 CHEST PAIN, UNSPECIFIED TYPE: ICD-10-CM

## 2018-07-05 RX ORDER — LABETALOL 100 MG/1
100 TABLET, FILM COATED ORAL EVERY 12 HOURS SCHEDULED
Qty: 180 TABLET | Refills: 3 | Status: SHIPPED | OUTPATIENT
Start: 2018-07-05 | End: 2019-10-16 | Stop reason: SDUPTHER

## 2018-07-05 RX ORDER — AMLODIPINE BESYLATE 10 MG/1
10 TABLET ORAL DAILY
Qty: 90 TABLET | Refills: 3 | Status: SHIPPED | OUTPATIENT
Start: 2018-07-05

## 2018-07-05 RX ORDER — AMLODIPINE BESYLATE 10 MG/1
10 TABLET ORAL DAILY
Qty: 90 TABLET | Refills: 3 | Status: CANCELLED | OUTPATIENT
Start: 2018-07-05

## 2018-07-05 RX ORDER — LABETALOL 100 MG/1
100 TABLET, FILM COATED ORAL EVERY 12 HOURS SCHEDULED
Qty: 180 TABLET | Refills: 3 | Status: CANCELLED | OUTPATIENT
Start: 2018-07-05

## 2018-07-05 NOTE — TELEPHONE ENCOUNTER
Patient called back and stated that they did not want them to go to [facility] but to [facility] pharmacy on [address].  Pending medications labetalol 100 mg and amlodipine 10 mg to B

## 2018-07-10 ENCOUNTER — HOSPITAL ENCOUNTER (OUTPATIENT)
Dept: VASCULAR LAB | Age: 73
Discharge: HOME | End: 2018-07-10
Attending: INTERNAL MEDICINE | Admitting: INTERNAL MEDICINE

## 2018-07-10 DIAGNOSIS — I65.23 OCCLUSION AND STENOSIS OF BILATERAL CAROTID ARTERIES: ICD-10-CM

## 2018-07-11 ENCOUNTER — TELEPHONE (OUTPATIENT)
Dept: CARDIOLOGY CLINIC | Age: 73
End: 2018-07-11

## 2018-07-18 ENCOUNTER — HOSPITAL ENCOUNTER (OUTPATIENT)
Dept: OTHER | Age: 73
Discharge: HOME | End: 2018-07-18
Attending: NEUROLOGICAL SURGERY | Admitting: NEUROLOGICAL SURGERY

## 2018-07-18 DIAGNOSIS — Z48.89 ENCOUNTER FOR OTHER SPECIFIED SURGICAL AFTERCARE (CODE): ICD-10-CM

## 2018-10-09 ENCOUNTER — OFFICE VISIT (OUTPATIENT)
Dept: NEUROLOGY | Age: 73
End: 2018-10-09
Payer: MEDICARE

## 2018-10-09 VITALS
HEIGHT: 72 IN | WEIGHT: 203 LBS | HEART RATE: 79 BPM | DIASTOLIC BLOOD PRESSURE: 101 MMHG | SYSTOLIC BLOOD PRESSURE: 178 MMHG | BODY MASS INDEX: 27.5 KG/M2

## 2018-10-09 DIAGNOSIS — R27.0 ATAXIA: Primary | ICD-10-CM

## 2018-10-09 DIAGNOSIS — G31.84 MILD COGNITIVE IMPAIRMENT, SO STATED: ICD-10-CM

## 2018-10-09 DIAGNOSIS — G93.89 CEREBRAL VENTRICULOMEGALY: ICD-10-CM

## 2018-10-09 DIAGNOSIS — E53.8 B12 DEFICIENCY: ICD-10-CM

## 2018-10-09 PROCEDURE — G8598 ASA/ANTIPLAT THER USED: HCPCS | Performed by: PSYCHIATRY & NEUROLOGY

## 2018-10-09 PROCEDURE — 4040F PNEUMOC VAC/ADMIN/RCVD: CPT | Performed by: PSYCHIATRY & NEUROLOGY

## 2018-10-09 PROCEDURE — 1036F TOBACCO NON-USER: CPT | Performed by: PSYCHIATRY & NEUROLOGY

## 2018-10-09 PROCEDURE — 3017F COLORECTAL CA SCREEN DOC REV: CPT | Performed by: PSYCHIATRY & NEUROLOGY

## 2018-10-09 PROCEDURE — G8484 FLU IMMUNIZE NO ADMIN: HCPCS | Performed by: PSYCHIATRY & NEUROLOGY

## 2018-10-09 PROCEDURE — G8427 DOCREV CUR MEDS BY ELIG CLIN: HCPCS | Performed by: PSYCHIATRY & NEUROLOGY

## 2018-10-09 PROCEDURE — 1101F PT FALLS ASSESS-DOCD LE1/YR: CPT | Performed by: PSYCHIATRY & NEUROLOGY

## 2018-10-09 PROCEDURE — 99214 OFFICE O/P EST MOD 30 MIN: CPT | Performed by: PSYCHIATRY & NEUROLOGY

## 2018-10-09 PROCEDURE — G8417 CALC BMI ABV UP PARAM F/U: HCPCS | Performed by: PSYCHIATRY & NEUROLOGY

## 2018-10-09 PROCEDURE — 1123F ACP DISCUSS/DSCN MKR DOCD: CPT | Performed by: PSYCHIATRY & NEUROLOGY

## 2018-10-09 NOTE — PATIENT INSTRUCTIONS
Please call with any questions or concerns:   [facility]  @ [phone]. LAB RESULTS:  Please obtain any labs or diagnostic tests as discussed today. You should call the office to check the results. Please allow  3 to 7 days for us to get these results. MEDICATION LIST:  Please bring an accurate list of your medications to every visit. APPOINTMENT CONFIRMATION:  We will call you the day before your scheduled appointment to confirm. If we are unable to reach you, you MUST call back by the end of the day to confirm the appointment or we may be forced to cancel.

## 2018-10-18 ENCOUNTER — OFFICE VISIT (OUTPATIENT)
Dept: CARDIOLOGY CLINIC | Age: 73
End: 2018-10-18
Payer: MEDICARE

## 2018-10-18 VITALS
DIASTOLIC BLOOD PRESSURE: 84 MMHG | OXYGEN SATURATION: 96 % | WEIGHT: 202 LBS | RESPIRATION RATE: 14 BRPM | BODY MASS INDEX: 27.36 KG/M2 | HEIGHT: 72 IN | HEART RATE: 66 BPM | SYSTOLIC BLOOD PRESSURE: 136 MMHG

## 2018-10-18 DIAGNOSIS — E78.2 MIXED HYPERLIPIDEMIA: Chronic | ICD-10-CM

## 2018-10-18 DIAGNOSIS — I65.23 BILATERAL CAROTID ARTERY STENOSIS: ICD-10-CM

## 2018-10-18 DIAGNOSIS — R00.2 PALPITATIONS: Primary | Chronic | ICD-10-CM

## 2018-10-18 DIAGNOSIS — I25.10 ATHEROSCLEROSIS OF NATIVE CORONARY ARTERY OF NATIVE HEART WITHOUT ANGINA PECTORIS: Chronic | ICD-10-CM

## 2018-10-18 DIAGNOSIS — I10 ESSENTIAL HYPERTENSION, BENIGN: Chronic | ICD-10-CM

## 2018-10-18 PROCEDURE — 1101F PT FALLS ASSESS-DOCD LE1/YR: CPT | Performed by: INTERNAL MEDICINE

## 2018-10-18 PROCEDURE — G8417 CALC BMI ABV UP PARAM F/U: HCPCS | Performed by: INTERNAL MEDICINE

## 2018-10-18 PROCEDURE — G8427 DOCREV CUR MEDS BY ELIG CLIN: HCPCS | Performed by: INTERNAL MEDICINE

## 2018-10-18 PROCEDURE — G8484 FLU IMMUNIZE NO ADMIN: HCPCS | Performed by: INTERNAL MEDICINE

## 2018-10-18 PROCEDURE — 1036F TOBACCO NON-USER: CPT | Performed by: INTERNAL MEDICINE

## 2018-10-18 PROCEDURE — 4040F PNEUMOC VAC/ADMIN/RCVD: CPT | Performed by: INTERNAL MEDICINE

## 2018-10-18 PROCEDURE — 99213 OFFICE O/P EST LOW 20 MIN: CPT | Performed by: INTERNAL MEDICINE

## 2018-10-18 PROCEDURE — 1123F ACP DISCUSS/DSCN MKR DOCD: CPT | Performed by: INTERNAL MEDICINE

## 2018-10-18 PROCEDURE — G8598 ASA/ANTIPLAT THER USED: HCPCS | Performed by: INTERNAL MEDICINE

## 2018-10-18 PROCEDURE — 3017F COLORECTAL CA SCREEN DOC REV: CPT | Performed by: INTERNAL MEDICINE

## 2018-10-18 NOTE — LETTER
[address]  [address] [address] [address] [address]  Phone: [phone]  Fax: [phone]    Farhan Flaherty MD        October 24, 2018     [facility]  [address] [facility] [address]    Patient: Marilu Jimenez  MR Number: [id number]  YOB: 1945  Date of Visit: 10/18/2018    Dear Dr. MAYES:    Thank you for the request for consultation for [facility] to me for the evaluation of cad. Below are the relevant portions of my assessment and plan of care. [address]   Cardiac Follow up    Referring Provider:  [facility]     Chief Complaint   Patient presents with    3 Month Follow-Up    Hypertension    Coronary Artery Disease    Palpitations        History of Present Illness:  Mr. Jase Murray is a 67 y.o.d gentleman here today as a follow up for  CAD, htn,hchol. He has  Untreated TIFFANY. In September 2014 , he had a ISELA to an 80% Circ lesion between 2 patent stents. November of 2016 he underwent stenting of the distal RCA and Mid LAD     Last visit,  I decrease Labetalol to 100mg po bid. His blood pressure is stable    He has no chest pain,  sob, palpitations or dizziness. No cardiac complications        Past Medical History:   has a past medical history of Acute MI ([address] CAD (coronary artery disease); Hyperlipidemia; Hypertension; and Type II or unspecified type diabetes mellitus without mention of complication, not stated as uncontrolled. Surgical History:   has a past surgical history that includes Coronary angioplasty with stent (2000, 2/24/2012); Hand surgery (5/09); and ESOPHAGOSCOPY. Social History:   reports that he quit smoking about 36 years ago. He smoked 0.00 packs per day for 20.00 years. He has never used smokeless tobacco. He reports that he drinks alcohol. He reports that he does not use drugs.      Family History:

## 2018-10-18 NOTE — PROGRESS NOTES
(Patient taking differently: Take 10 mg by mouth nightly ) 90 tablet 3    losartan (COZAAR) 50 MG tablet Take 1 tablet by mouth daily 90 tablet 3    Coenzyme Q10 (COQ10) 200 MG CAPS Take 1 tablet by mouth daily 30 capsule 11    clopidogrel (PLAVIX) 75 MG tablet TAKE 1 TABLET BY MOUTH DAILY 90 tablet 3    omeprazole (PRILOSEC) 40 MG delayed release capsule Take 40 mg by mouth daily      nitroGLYCERIN (NITROLINGUAL) 0.4 MG/SPRAY 0.4 mg spray Place 1 spray under the tongue every 5 minutes as needed for Chest pain 1 Bottle 2    HYDROcodone-acetaminophen (NORCO)  MG per tablet Take 1 tablet by mouth every 6 hours as needed for Pain      sildenafil (VIAGRA) 100 MG tablet Take 1 tablet by mouth as needed for Erectile Dysfunction 8 tablet 3    aspirin 81 MG tablet Take 81 mg by mouth daily       No facility-administered encounter medications on file as of 10/18/2018. Allergies:  Patient has no known allergies. Review of Systems:   · Constitutional: there has been no unanticipated weight loss. Theres been change in energy level, none in sleep pattern    · Eyes: No visual changes or diplopia. No scleral icterus. · ENT: No Headaches,  or vertigo. No mouth sores or sore throat. complains of hearing loss  · Cardiovascular: Reviewed in HPI  · Respiratory: No cough or wheezing, no sputum production. No hematemesis. · Gastrointestinal: No abdominal pain, appetite loss, blood in stools. No change in bowel or bladder habits. · Genitourinary: No dysuria, trouble voiding, or hematuria. · Musculoskeletal:  No gait disturbance, weakness--complain of back pain  · Integumentary: No rash or pruritis. · Neurological: No headache, diplopia, change in muscle strength, numbness or tingling. No change in gait, balance, coordination, mood, affect, memory, mentation, behavior. · Psychiatric: No anxiety, no depression. · Endocrine: No malaise, fatigue or temperature intolerance.  No excessive thirst, fluid intake,

## 2018-10-24 NOTE — COMMUNICATION BODY
(Patient taking differently: Take 10 mg by mouth nightly ) 90 tablet 3    losartan (COZAAR) 50 MG tablet Take 1 tablet by mouth daily 90 tablet 3    Coenzyme Q10 (COQ10) 200 MG CAPS Take 1 tablet by mouth daily 30 capsule 11    clopidogrel (PLAVIX) 75 MG tablet TAKE 1 TABLET BY MOUTH DAILY 90 tablet 3    omeprazole (PRILOSEC) 40 MG delayed release capsule Take 40 mg by mouth daily      nitroGLYCERIN (NITROLINGUAL) 0.4 MG/SPRAY 0.4 mg spray Place 1 spray under the tongue every 5 minutes as needed for Chest pain 1 Bottle 2    HYDROcodone-acetaminophen (NORCO)  MG per tablet Take 1 tablet by mouth every 6 hours as needed for Pain      sildenafil (VIAGRA) 100 MG tablet Take 1 tablet by mouth as needed for Erectile Dysfunction 8 tablet 3    aspirin 81 MG tablet Take 81 mg by mouth daily       No facility-administered encounter medications on file as of 10/18/2018. Allergies:  Patient has no known allergies. Review of Systems:   · Constitutional: there has been no unanticipated weight loss. Theres been change in energy level, none in sleep pattern    · Eyes: No visual changes or diplopia. No scleral icterus. · ENT: No Headaches,  or vertigo. No mouth sores or sore throat. complains of hearing loss  · Cardiovascular: Reviewed in HPI  · Respiratory: No cough or wheezing, no sputum production. No hematemesis. · Gastrointestinal: No abdominal pain, appetite loss, blood in stools. No change in bowel or bladder habits. · Genitourinary: No dysuria, trouble voiding, or hematuria. · Musculoskeletal:  No gait disturbance, weakness--complain of back pain  · Integumentary: No rash or pruritis. · Neurological: No headache, diplopia, change in muscle strength, numbness or tingling. No change in gait, balance, coordination, mood, affect, memory, mentation, behavior. · Psychiatric: No anxiety, no depression. · Endocrine: No malaise, fatigue or temperature intolerance.  No excessive thirst, fluid intake, or urination. No tremor. · Hematologic/Lymphatic: No abnormal bruising or bleeding, blood clots or swollen lymph nodes. Physical Examination:    Vitals:    11/08/17 1023   BP: 129/76--200/96   Pulse: 66   Constitutional and General Appearance:   . NAD   SKIN:  .     Warm and dry  EYES:    .     EOMI  Neck:   . Normal carotid contour  Respiratory:  · Normal excursion and expansion without use of accessory muscles  · Resp Auscultation: Normal breath sounds without dullness  Cardiovascular:  · The apical impulses not displaced  · Heart tones are crisp and normal  · Cervical veins are not engorged  · Normal S1S2, No S3, No Murmur  · Peripheral pulses are symmetrical and full  Extremities:  · There is no clubbing, cyanosis of the extremities. · No edema  · Femoral Arteries: 2+ and equal  · Pedal Pulses: 2+ and equal   Abdomen:  · No masses or tenderness  · Liver/Spleen: No Abnormalities Noted  Neurological/Psychiatric:  · Alert and oriented in all spheres  · Moves all extremities well  · Exhibits normal gait balance and coordination  · No abnormalities of mood, affect, memory    Echo 1/2015:  Normal left ventricle size and systolic function with an estimated ejection fraction of 60%. No regional wall motion abnormalities are seen. There is moderate concentric left ventricular hypertrophy. Normal diastolic filling pattern for age. Mild mitral regurgitation is present. The aortic valve appears sclerotic but opens well. There is mild tricuspid regurgitation with RVSP estimated at 34 mmHg. 10/16/17 echo  Normal left ventricle size, wall thickness and systolic function with an   estimated ejection fraction of 55%.   Mild aortic stenosis.   Trivial tricuspid regurgitation.  RVSP 17 mmHg. 2009  abd ultrasound--no  AAA    Assessment:   1    CAD--September 2014 , he had a ISELA to an 80% Circ lesion between 2 patent stents.   November of 2016 he underwent stenting of the distal RCA and Mid

## 2018-11-19 RX ORDER — ROSUVASTATIN CALCIUM 10 MG/1
TABLET, COATED ORAL
Qty: 260 TABLET | Refills: 0 | Status: SHIPPED | OUTPATIENT
Start: 2018-11-19 | End: 2019-02-26 | Stop reason: SDUPTHER

## 2019-01-01 ENCOUNTER — OFFICE VISIT (OUTPATIENT)
Dept: CARDIOLOGY CLINIC | Age: 74
End: 2019-01-01
Payer: MEDICARE

## 2019-01-01 ENCOUNTER — HOSPITAL ENCOUNTER (OUTPATIENT)
Dept: VASCULAR LAB | Age: 74
Discharge: HOME | End: 2019-11-04
Payer: MEDICARE

## 2019-01-01 VITALS
WEIGHT: 201 LBS | HEART RATE: 52 BPM | BODY MASS INDEX: 27.22 KG/M2 | SYSTOLIC BLOOD PRESSURE: 136 MMHG | HEIGHT: 72 IN | DIASTOLIC BLOOD PRESSURE: 90 MMHG

## 2019-01-01 DIAGNOSIS — I10 ESSENTIAL HYPERTENSION, BENIGN: ICD-10-CM

## 2019-01-01 DIAGNOSIS — S40.021A CONTUSION OF RIGHT UPPER EXTREMITY, INITIAL ENCOUNTER: ICD-10-CM

## 2019-01-01 DIAGNOSIS — E78.2 MIXED HYPERLIPIDEMIA: ICD-10-CM

## 2019-01-01 DIAGNOSIS — I25.10 ATHEROSCLEROSIS OF NATIVE CORONARY ARTERY OF NATIVE HEART, ANGINA PRESENCE UNSPECIFIED: Primary | ICD-10-CM

## 2019-01-01 DIAGNOSIS — I35.0 AORTIC VALVE STENOSIS, ETIOLOGY OF CARDIAC VALVE DISEASE UNSPECIFIED: ICD-10-CM

## 2019-01-01 DIAGNOSIS — I65.23 BILATERAL CAROTID ARTERY STENOSIS: ICD-10-CM

## 2019-01-01 PROCEDURE — 4040F PNEUMOC VAC/ADMIN/RCVD: CPT | Performed by: INTERNAL MEDICINE

## 2019-01-01 PROCEDURE — G8598 ASA/ANTIPLAT THER USED: HCPCS | Performed by: INTERNAL MEDICINE

## 2019-01-01 PROCEDURE — 1036F TOBACCO NON-USER: CPT | Performed by: INTERNAL MEDICINE

## 2019-01-01 PROCEDURE — 93971 EXTREMITY STUDY: CPT

## 2019-01-01 PROCEDURE — 99214 OFFICE O/P EST MOD 30 MIN: CPT | Performed by: INTERNAL MEDICINE

## 2019-01-01 PROCEDURE — G8417 CALC BMI ABV UP PARAM F/U: HCPCS | Performed by: INTERNAL MEDICINE

## 2019-01-01 PROCEDURE — 1123F ACP DISCUSS/DSCN MKR DOCD: CPT | Performed by: INTERNAL MEDICINE

## 2019-01-01 PROCEDURE — 3017F COLORECTAL CA SCREEN DOC REV: CPT | Performed by: INTERNAL MEDICINE

## 2019-01-01 PROCEDURE — G8427 DOCREV CUR MEDS BY ELIG CLIN: HCPCS | Performed by: INTERNAL MEDICINE

## 2019-01-01 PROCEDURE — G8484 FLU IMMUNIZE NO ADMIN: HCPCS | Performed by: INTERNAL MEDICINE

## 2019-02-27 RX ORDER — ROSUVASTATIN CALCIUM 10 MG/1
TABLET, COATED ORAL
Qty: 90 TABLET | Refills: 3 | Status: SHIPPED | OUTPATIENT
Start: 2019-02-27

## 2019-06-11 ENCOUNTER — TELEPHONE (OUTPATIENT)
Dept: CARDIOLOGY CLINIC | Age: 74
End: 2019-06-11

## 2019-06-11 ENCOUNTER — OFFICE VISIT (OUTPATIENT)
Dept: NEUROLOGY | Age: 74
End: 2019-06-11
Payer: MEDICARE

## 2019-06-11 VITALS
DIASTOLIC BLOOD PRESSURE: 105 MMHG | HEIGHT: 72 IN | BODY MASS INDEX: 26.95 KG/M2 | SYSTOLIC BLOOD PRESSURE: 221 MMHG | WEIGHT: 199 LBS | HEART RATE: 78 BPM

## 2019-06-11 DIAGNOSIS — G93.89 CEREBRAL VENTRICULOMEGALY: ICD-10-CM

## 2019-06-11 DIAGNOSIS — R27.0 ATAXIA: Primary | ICD-10-CM

## 2019-06-11 DIAGNOSIS — E53.8 B12 DEFICIENCY: ICD-10-CM

## 2019-06-11 DIAGNOSIS — G31.84 MILD COGNITIVE IMPAIRMENT, SO STATED: ICD-10-CM

## 2019-06-11 PROCEDURE — 3017F COLORECTAL CA SCREEN DOC REV: CPT | Performed by: PSYCHIATRY & NEUROLOGY

## 2019-06-11 PROCEDURE — 99214 OFFICE O/P EST MOD 30 MIN: CPT | Performed by: PSYCHIATRY & NEUROLOGY

## 2019-06-11 PROCEDURE — G8427 DOCREV CUR MEDS BY ELIG CLIN: HCPCS | Performed by: PSYCHIATRY & NEUROLOGY

## 2019-06-11 PROCEDURE — 4040F PNEUMOC VAC/ADMIN/RCVD: CPT | Performed by: PSYCHIATRY & NEUROLOGY

## 2019-06-11 PROCEDURE — 1036F TOBACCO NON-USER: CPT | Performed by: PSYCHIATRY & NEUROLOGY

## 2019-06-11 PROCEDURE — 1123F ACP DISCUSS/DSCN MKR DOCD: CPT | Performed by: PSYCHIATRY & NEUROLOGY

## 2019-06-11 PROCEDURE — G8598 ASA/ANTIPLAT THER USED: HCPCS | Performed by: PSYCHIATRY & NEUROLOGY

## 2019-06-11 PROCEDURE — G8417 CALC BMI ABV UP PARAM F/U: HCPCS | Performed by: PSYCHIATRY & NEUROLOGY

## 2019-06-11 NOTE — PROGRESS NOTES
[address] [facility]   Neurology followup    Subjective:   CC/HP  History was obtained from the patient. Additional history was obtained from the patient's wife. Interval history:  Patient continues to have balance difficulties. Patient uses a cane to ambulate. Patient continues to have memory impairment. No major changes since last office visit  He has been taking the B12 regularly. He continues to drink alcohol   His blood pressure was noted to be high today. Patient apparently forgets to take his medications at times  Details of his history are as follows:  Patient was referred for some gait difficulties. Patient states that his been dragging the right leg and foot for the last 1 month. He states that sometimes he also shuffles when he tries to walk.   Denies any actual focal weakness or numbness  Patient has had chronic low back pain and neck pain from arthritis  Symptom onset was subacute and is moderate in severity without any aggravating or relieving factors  No other neurological symptoms  Patient has diet-controlled diabetes  He also has hypertension and coronary artery disease as well as hyperlipidemia    REVIEW OF SYSTEMS    Constitutional:  []   Chills   []  Fatigue   []  Fevers   []  Malaise   []  Weight loss     [x] Denies all of the above    Respiratory:   []  Cough    []  Shortness of breath         [x] Denies all of the above     Cardiovascular:   []  Chest pain    []  Exertional chest pressure/discomfort           [] Palpitations    []  Syncope     [x] Denies all of the above        Past Medical History:   Diagnosis Date    Acute MI ([facility] [address] 2000    CAD (coronary artery disease)     Hyperlipidemia     Hypertension     Type II or unspecified type diabetes mellitus without mention of complication, not stated as uncontrolled     diet controlled     Family History   Problem Relation Age of Onset    Heart Disease Father     High Blood Pressure Father     High Cholesterol Father     Heart Disease Maternal Grandfather     Heart Disease Paternal Grandfather      Social History     Socioeconomic History    Marital status:      Spouse name: None    Number of children: None    Years of education: None    Highest education level: None   Occupational History    None   Social Needs    Financial resource strain: None    Food insecurity:     Worry: None     Inability: None    Transportation needs:     Medical: None     Non-medical: None   Tobacco Use    Smoking status: Former Smoker     Packs/day: 0.00     Years: 20.00     Pack years: 0.00     Last attempt to quit: 1982     Years since quittin.3    Smokeless tobacco: Never Used   Substance and Sexual Activity    Alcohol use: Yes     Comment: Occ    Drug use: No    Sexual activity: None   Lifestyle    Physical activity:     Days per week: None     Minutes per session: None    Stress: None   Relationships    Social connections:     Talks on phone: None     Gets together: None     Attends Mandaen service: None     Active member of club or organization: None     Attends meetings of clubs or organizations: None     Relationship status: None    Intimate partner violence:     Fear of current or ex partner: None     Emotionally abused: None     Physically abused: None     Forced sexual activity: None   Other Topics Concern    None   Social History Narrative    None        Objective:  Exam:  BP (!) 221/105   Pulse 78   Ht 6' (1.829 m)   Wt 199 lb (90.3 kg)   BMI 26.99 kg/m²   This is a well-nourished patient in no acute distress  Patient is awake, alert and oriented x3. Speech is normal.  Pupils are equal round reacting to light. Extraocular movements intact. Face symmetrical. Tongue midline. Motor:  Muscle tone and bulk are normal.   Strength is symmetrical 5/5 in all four extremities.   Sensory: Intact to light touch and  pin prick in all four extremities  Coordination:  Normal  Finger to Nose and Heel to Shin bilaterally .Reflexes:  DTR 1+ in the upper extremities and 3+ in the knees and 2+ in the ankles  Plantar response: Withdrawal response bilaterally  Gait: Gait is slightly unsteady and patient has some difficulty with tandem walking   Romberg: negative  Vascular: No carotid bruit bilaterally  Data :  LABS:  General Labs:    CBC:   Lab Results   Component Value Date    WBC 5.1 02/20/2018    RBC 5.09 02/20/2018    HGB 15.2 02/20/2018    HCT 43.3 02/20/2018    MCV 85.1 02/20/2018    MCH 29.9 02/20/2018    MCHC 35.2 02/20/2018    RDW 14.7 02/20/2018     02/20/2018    MPV 8.0 02/20/2018     BMP:    Lab Results   Component Value Date     02/20/2018    K 4.4 02/20/2018    CL 96 02/20/2018    CO2 27 02/20/2018    BUN 12 02/20/2018    LABALBU 4.0 02/20/2018    CREATININE 0.8 02/20/2018    CALCIUM 9.4 02/20/2018    GFRAA >60 02/20/2018    GFRAA >60 02/25/2012    LABGLOM >60 02/20/2018    GLUCOSE 347 02/20/2018     RADIOLOGY REVIEW:  I have reviewed radiology image(s) and reports(s) of:  MRI brain, cervical spine and thoracic spine    Impression :  Ataxia, slowly getting worse  MRI brain showed mild ventriculomegaly but there was also significant cortical atrophy  MRI cervical spine was normal  MRI since thoracic spine showed arthritis but no evidence of spinal cord impingement  B12 level was borderline low at 330  Serum copper level was normal  Cognitive impairment  Hypertension, blood pressure was quite high in the office today probably due to poor compliance patient promised to go home today and take his blood pressure medications and have it rechecked he will also check with his cardiologist      Plan :  Discussed with patient and his wife  Recommended that he completely quit drinking alcohol  Continue B12 replacement  We discussed about exercise, healthy diet and lifestyle modification. I have strongly urged him to take his medications regularly.     Patient's wife will start controlling the medication and give it to him.  Patient agreed to this and states that he will take the medicines when he gives it to her   He was concerned about hardening of the arteries in the brain and there is also a family history of Alzheimer's disease. These issues were discussed as well. Return in 6 months              Please note a portion of  this chart was generated using dragon dictation software. Although every effort was made to ensure the accuracy of this automated transcription, some errors in transcription may have occurred.

## 2019-06-11 NOTE — PATIENT INSTRUCTIONS
Please call with any questions or concerns:   [facility] Neurology  @ [phone]. LAB RESULTS:  Please obtain any labs or diagnostic tests as discussed today. You should call the office to check the results. Please allow  3 to 7 days for us to get these results. MEDICATION LIST:  Please bring an accurate list of your medications to every visit. APPOINTMENT CONFIRMATION:  We will call you the day before your scheduled appointment to confirm. If we are unable to reach you, you MUST call back by the end of the day to confirm the appointment or we may be forced to cancel.

## 2019-06-12 NOTE — TELEPHONE ENCOUNTER
I spoke with pt wife. I relayed message per ANITHA. She stated that pt had an appointment with neurologist and Dr Natacha Jacome to the wife that he was putting the responsibility of giving out the meds to pt. She stated that they started last night and dosed again this morning.  I wished her luck and she stated that she would do her best.

## 2019-07-22 ENCOUNTER — TELEPHONE (OUTPATIENT)
Dept: CARDIOLOGY CLINIC | Age: 74
End: 2019-07-22

## 2019-07-22 RX ORDER — CLOPIDOGREL BISULFATE 75 MG/1
75 TABLET ORAL DAILY
Qty: 90 TABLET | Refills: 0 | Status: SHIPPED | OUTPATIENT
Start: 2019-07-22

## 2019-10-15 DIAGNOSIS — R53.83 FATIGUE, UNSPECIFIED TYPE: ICD-10-CM

## 2019-10-15 DIAGNOSIS — I10 ESSENTIAL HYPERTENSION, BENIGN: Chronic | ICD-10-CM

## 2019-10-15 DIAGNOSIS — I25.10 ATHEROSCLEROSIS OF NATIVE CORONARY ARTERY OF NATIVE HEART, ANGINA PRESENCE UNSPECIFIED: ICD-10-CM

## 2019-10-15 DIAGNOSIS — R07.9 CHEST PAIN, UNSPECIFIED TYPE: ICD-10-CM

## 2019-10-16 RX ORDER — LOSARTAN POTASSIUM 50 MG/1
50 TABLET ORAL DAILY
Qty: 90 TABLET | Refills: 3 | Status: SHIPPED | OUTPATIENT
Start: 2019-10-16

## 2019-10-16 RX ORDER — LABETALOL 100 MG/1
100 TABLET, FILM COATED ORAL EVERY 12 HOURS SCHEDULED
Qty: 180 TABLET | Refills: 3 | Status: ON HOLD | OUTPATIENT
Start: 2019-10-16 | End: 2020-01-01 | Stop reason: HOSPADM

## 2020-01-01 ENCOUNTER — HOSPITAL ENCOUNTER (INPATIENT)
Age: 75
LOS: 9 days | Discharge: NURSING, REHAB OR LONG-TERM CARE | DRG: 064 | End: 2020-10-22
Attending: STUDENT IN AN ORGANIZED HEALTH CARE EDUCATION/TRAINING PROGRAM | Admitting: INTERNAL MEDICINE
Payer: MEDICARE

## 2020-01-01 ENCOUNTER — ANESTHESIA (OUTPATIENT)
Dept: ENDOSCOPY | Age: 75
DRG: 064 | End: 2020-01-01
Payer: MEDICARE

## 2020-01-01 ENCOUNTER — HOSPITAL ENCOUNTER (INPATIENT)
Age: 75
LOS: 1 days | Discharge: HOME | DRG: 057 | End: 2020-10-23
Attending: PHYSICAL MEDICINE & REHABILITATION | Admitting: PHYSICAL MEDICINE & REHABILITATION
Payer: MEDICARE

## 2020-01-01 ENCOUNTER — APPOINTMENT (OUTPATIENT)
Dept: GENERAL RADIOLOGY | Age: 75
DRG: 064 | End: 2020-01-01
Payer: MEDICARE

## 2020-01-01 ENCOUNTER — APPOINTMENT (OUTPATIENT)
Dept: CT IMAGING | Age: 75
DRG: 064 | End: 2020-01-01
Payer: MEDICARE

## 2020-01-01 ENCOUNTER — APPOINTMENT (OUTPATIENT)
Dept: CT IMAGING | Age: 75
DRG: 064 | End: 2020-01-01
Attending: INTERNAL MEDICINE
Payer: MEDICARE

## 2020-01-01 ENCOUNTER — ANESTHESIA EVENT (OUTPATIENT)
Dept: ENDOSCOPY | Age: 75
DRG: 064 | End: 2020-01-01
Payer: MEDICARE

## 2020-01-01 ENCOUNTER — HOSPITAL ENCOUNTER (OUTPATIENT)
Age: 75
Discharge: HOME | End: 2020-09-24
Payer: MEDICARE

## 2020-01-01 ENCOUNTER — HOSPITAL ENCOUNTER (INPATIENT)
Age: 75
LOS: 3 days | DRG: 064 | End: 2020-10-26
Attending: INTERNAL MEDICINE | Admitting: INTERNAL MEDICINE
Payer: MEDICARE

## 2020-01-01 ENCOUNTER — TELEPHONE (OUTPATIENT)
Dept: NEUROLOGY | Age: 75
End: 2020-01-01

## 2020-01-01 ENCOUNTER — APPOINTMENT (OUTPATIENT)
Dept: MRI IMAGING | Age: 75
DRG: 064 | End: 2020-01-01
Payer: MEDICARE

## 2020-01-01 ENCOUNTER — APPOINTMENT (OUTPATIENT)
Dept: GENERAL RADIOLOGY | Age: 75
DRG: 064 | End: 2020-01-01
Attending: INTERNAL MEDICINE
Payer: MEDICARE

## 2020-01-01 ENCOUNTER — APPOINTMENT (OUTPATIENT)
Dept: MRI IMAGING | Age: 75
DRG: 064 | End: 2020-01-01
Attending: INTERNAL MEDICINE
Payer: MEDICARE

## 2020-01-01 VITALS
RESPIRATION RATE: 16 BRPM | DIASTOLIC BLOOD PRESSURE: 142 MMHG | SYSTOLIC BLOOD PRESSURE: 270 MMHG | OXYGEN SATURATION: 94 %

## 2020-01-01 VITALS
TEMPERATURE: 97.4 F | BODY MASS INDEX: 25.33 KG/M2 | DIASTOLIC BLOOD PRESSURE: 91 MMHG | SYSTOLIC BLOOD PRESSURE: 185 MMHG | HEART RATE: 66 BPM | WEIGHT: 187 LBS | HEIGHT: 72 IN | OXYGEN SATURATION: 99 % | RESPIRATION RATE: 24 BRPM

## 2020-01-01 VITALS
BODY MASS INDEX: 24.31 KG/M2 | SYSTOLIC BLOOD PRESSURE: 158 MMHG | HEIGHT: 72 IN | OXYGEN SATURATION: 88 % | TEMPERATURE: 97.9 F | HEART RATE: 100 BPM | RESPIRATION RATE: 32 BRPM | DIASTOLIC BLOOD PRESSURE: 66 MMHG | WEIGHT: 179.45 LBS

## 2020-01-01 VITALS
WEIGHT: 187.2 LBS | BODY MASS INDEX: 25.35 KG/M2 | TEMPERATURE: 98.6 F | RESPIRATION RATE: 18 BRPM | HEIGHT: 72 IN | SYSTOLIC BLOOD PRESSURE: 140 MMHG | DIASTOLIC BLOOD PRESSURE: 80 MMHG | HEART RATE: 70 BPM | OXYGEN SATURATION: 98 %

## 2020-01-01 LAB
A/G RATIO: 1.4 (ref 1.1–2.2)
A/G RATIO: 1.5 (ref 1.1–2.2)
A/G RATIO: 1.5 (ref 1.1–2.2)
A/G RATIO: 1.6 (ref 1.1–2.2)
ABO/RH: NORMAL
ALBUMIN SERPL-MCNC: 3 G/DL (ref 3.4–5)
ALBUMIN SERPL-MCNC: 3 G/DL (ref 3.4–5)
ALBUMIN SERPL-MCNC: 3.1 G/DL (ref 3.4–5)
ALBUMIN SERPL-MCNC: 3.3 G/DL (ref 3.4–5)
ALBUMIN SERPL-MCNC: 3.7 G/DL (ref 3.4–5)
ALBUMIN SERPL-MCNC: 3.7 G/DL (ref 3.4–5)
ALBUMIN SERPL-MCNC: 4.3 G/DL (ref 3.4–5)
ALBUMIN SERPL-MCNC: 4.4 G/DL (ref 3.4–5)
ALP BLD-CCNC: 104 U/L (ref 40–129)
ALP BLD-CCNC: 105 U/L (ref 40–129)
ALP BLD-CCNC: 89 U/L (ref 40–129)
ALP BLD-CCNC: 99 U/L (ref 40–129)
ALT SERPL-CCNC: 14 U/L (ref 10–40)
ALT SERPL-CCNC: 19 U/L (ref 10–40)
ALT SERPL-CCNC: 21 U/L (ref 10–40)
ALT SERPL-CCNC: 24 U/L (ref 10–40)
AMMONIA: 101 UMOL/L (ref 16–60)
AMMONIA: 16 UMOL/L (ref 16–60)
AMMONIA: 17 UMOL/L (ref 16–60)
ANION GAP SERPL CALCULATED.3IONS-SCNC: 10 MMOL/L (ref 3–16)
ANION GAP SERPL CALCULATED.3IONS-SCNC: 11 MMOL/L (ref 3–16)
ANION GAP SERPL CALCULATED.3IONS-SCNC: 12 MMOL/L (ref 3–16)
ANION GAP SERPL CALCULATED.3IONS-SCNC: 13 MMOL/L (ref 3–16)
ANION GAP SERPL CALCULATED.3IONS-SCNC: 9 MMOL/L (ref 3–16)
ANTIBODY SCREEN: NORMAL
AST SERPL-CCNC: 13 U/L (ref 15–37)
AST SERPL-CCNC: 18 U/L (ref 15–37)
AST SERPL-CCNC: 18 U/L (ref 15–37)
AST SERPL-CCNC: 19 U/L (ref 15–37)
BASE EXCESS ARTERIAL: -1 (ref -3–3)
BASE EXCESS ARTERIAL: -3.3 MMOL/L (ref -3–3)
BASE EXCESS ARTERIAL: -4.1 MMOL/L (ref -3–3)
BASE EXCESS ARTERIAL: -4.5 MMOL/L (ref -3–3)
BASE EXCESS ARTERIAL: -6 (ref -3–3)
BASE EXCESS ARTERIAL: -7 (ref -3–3)
BASE EXCESS ARTERIAL: 1 (ref -3–3)
BASOPHILS ABSOLUTE: 0 K/UL (ref 0–0.2)
BASOPHILS RELATIVE PERCENT: 0.1 %
BASOPHILS RELATIVE PERCENT: 0.1 %
BASOPHILS RELATIVE PERCENT: 0.2 %
BASOPHILS RELATIVE PERCENT: 0.2 %
BASOPHILS RELATIVE PERCENT: 0.3 %
BASOPHILS RELATIVE PERCENT: 0.4 %
BASOPHILS RELATIVE PERCENT: 0.5 %
BILIRUB SERPL-MCNC: 1.5 MG/DL (ref 0–1)
BILIRUB SERPL-MCNC: 1.6 MG/DL (ref 0–1)
BILIRUB SERPL-MCNC: 1.7 MG/DL (ref 0–1)
BILIRUB SERPL-MCNC: 2.6 MG/DL (ref 0–1)
BILIRUBIN DIRECT: 0.3 MG/DL (ref 0–0.3)
BILIRUBIN URINE: ABNORMAL
BILIRUBIN URINE: ABNORMAL
BILIRUBIN, INDIRECT: 1.3 MG/DL (ref 0–1)
BLOOD, URINE: ABNORMAL
BLOOD, URINE: NEGATIVE
BUN BLDV-MCNC: 13 MG/DL (ref 7–20)
BUN BLDV-MCNC: 13 MG/DL (ref 7–20)
BUN BLDV-MCNC: 14 MG/DL (ref 7–20)
BUN BLDV-MCNC: 16 MG/DL (ref 7–20)
BUN BLDV-MCNC: 20 MG/DL (ref 7–20)
BUN BLDV-MCNC: 22 MG/DL (ref 7–20)
BUN BLDV-MCNC: 26 MG/DL (ref 7–20)
BUN BLDV-MCNC: 29 MG/DL (ref 7–20)
BUN BLDV-MCNC: 30 MG/DL (ref 7–20)
BUN BLDV-MCNC: 33 MG/DL (ref 7–20)
BUN BLDV-MCNC: 37 MG/DL (ref 7–20)
BUN BLDV-MCNC: 39 MG/DL (ref 7–20)
BUN BLDV-MCNC: 44 MG/DL (ref 7–20)
C DIFF TOXIN/ANTIGEN: NORMAL
CALCIUM IONIZED: 1.23 MMOL/L (ref 1.12–1.32)
CALCIUM IONIZED: 1.26 MMOL/L (ref 1.12–1.32)
CALCIUM IONIZED: 1.27 MMOL/L (ref 1.12–1.32)
CALCIUM SERPL-MCNC: 8 MG/DL (ref 8.3–10.6)
CALCIUM SERPL-MCNC: 8.1 MG/DL (ref 8.3–10.6)
CALCIUM SERPL-MCNC: 8.4 MG/DL (ref 8.3–10.6)
CALCIUM SERPL-MCNC: 8.4 MG/DL (ref 8.3–10.6)
CALCIUM SERPL-MCNC: 8.5 MG/DL (ref 8.3–10.6)
CALCIUM SERPL-MCNC: 8.7 MG/DL (ref 8.3–10.6)
CALCIUM SERPL-MCNC: 8.7 MG/DL (ref 8.3–10.6)
CALCIUM SERPL-MCNC: 8.9 MG/DL (ref 8.3–10.6)
CALCIUM SERPL-MCNC: 9.3 MG/DL (ref 8.3–10.6)
CALCIUM SERPL-MCNC: 9.6 MG/DL (ref 8.3–10.6)
CALCIUM SERPL-MCNC: 9.6 MG/DL (ref 8.3–10.6)
CARBOXYHEMOGLOBIN ARTERIAL: 1.4 % (ref 0–1.5)
CARBOXYHEMOGLOBIN ARTERIAL: 1.5 % (ref 0–1.5)
CARBOXYHEMOGLOBIN ARTERIAL: 1.6 % (ref 0–1.5)
CHLORIDE BLD-SCNC: 101 MMOL/L (ref 99–110)
CHLORIDE BLD-SCNC: 104 MMOL/L (ref 99–110)
CHLORIDE BLD-SCNC: 104 MMOL/L (ref 99–110)
CHLORIDE BLD-SCNC: 105 MMOL/L (ref 99–110)
CHLORIDE BLD-SCNC: 105 MMOL/L (ref 99–110)
CHLORIDE BLD-SCNC: 106 MMOL/L (ref 99–110)
CHLORIDE BLD-SCNC: 107 MMOL/L (ref 99–110)
CHLORIDE BLD-SCNC: 108 MMOL/L (ref 99–110)
CHLORIDE BLD-SCNC: 109 MMOL/L (ref 99–110)
CHLORIDE BLD-SCNC: 109 MMOL/L (ref 99–110)
CHLORIDE BLD-SCNC: 110 MMOL/L (ref 99–110)
CHLORIDE BLD-SCNC: 112 MMOL/L (ref 99–110)
CHLORIDE BLD-SCNC: 114 MMOL/L (ref 99–110)
CHOLESTEROL, TOTAL: 183 MG/DL (ref 0–199)
CHOLESTEROL, TOTAL: 190 MG/DL (ref 0–199)
CHP ED QC CHECK: YES
CLARITY: ABNORMAL
CLARITY: CLEAR
CO2: 19 MMOL/L (ref 21–32)
CO2: 19 MMOL/L (ref 21–32)
CO2: 20 MMOL/L (ref 21–32)
CO2: 21 MMOL/L (ref 21–32)
CO2: 23 MMOL/L (ref 21–32)
CO2: 24 MMOL/L (ref 21–32)
CO2: 25 MMOL/L (ref 21–32)
CO2: 27 MMOL/L (ref 21–32)
COLOR: YELLOW
COLOR: YELLOW
CREAT SERPL-MCNC: 1 MG/DL (ref 0.8–1.3)
CREAT SERPL-MCNC: 1.1 MG/DL (ref 0.8–1.3)
CREAT SERPL-MCNC: 1.1 MG/DL (ref 0.8–1.3)
CREAT SERPL-MCNC: 1.2 MG/DL (ref 0.8–1.3)
CREAT SERPL-MCNC: 1.3 MG/DL (ref 0.8–1.3)
CREAT SERPL-MCNC: 1.6 MG/DL (ref 0.8–1.3)
CREAT SERPL-MCNC: 1.9 MG/DL (ref 0.8–1.3)
CREAT SERPL-MCNC: 2.2 MG/DL (ref 0.8–1.3)
CREATININE URINE: 184.5 MG/DL (ref 39–259)
EKG ATRIAL RATE: 64 BPM
EKG ATRIAL RATE: 67 BPM
EKG ATRIAL RATE: 76 BPM
EKG ATRIAL RATE: 78 BPM
EKG ATRIAL RATE: 81 BPM
EKG DIAGNOSIS: NORMAL
EKG P AXIS: -18 DEGREES
EKG P AXIS: -8 DEGREES
EKG P AXIS: 19 DEGREES
EKG P AXIS: 38 DEGREES
EKG P AXIS: 42 DEGREES
EKG P-R INTERVAL: 144 MS
EKG P-R INTERVAL: 152 MS
EKG P-R INTERVAL: 164 MS
EKG P-R INTERVAL: 166 MS
EKG P-R INTERVAL: 170 MS
EKG Q-T INTERVAL: 392 MS
EKG Q-T INTERVAL: 412 MS
EKG Q-T INTERVAL: 414 MS
EKG Q-T INTERVAL: 428 MS
EKG Q-T INTERVAL: 440 MS
EKG QRS DURATION: 90 MS
EKG QRS DURATION: 90 MS
EKG QRS DURATION: 92 MS
EKG QRS DURATION: 92 MS
EKG QRS DURATION: 98 MS
EKG QTC CALCULATION (BAZETT): 441 MS
EKG QTC CALCULATION (BAZETT): 446 MS
EKG QTC CALCULATION (BAZETT): 463 MS
EKG QTC CALCULATION (BAZETT): 464 MS
EKG QTC CALCULATION (BAZETT): 480 MS
EKG R AXIS: -10 DEGREES
EKG R AXIS: -16 DEGREES
EKG R AXIS: -17 DEGREES
EKG R AXIS: -20 DEGREES
EKG R AXIS: -9 DEGREES
EKG T AXIS: 104 DEGREES
EKG T AXIS: 126 DEGREES
EKG T AXIS: 130 DEGREES
EKG T AXIS: 135 DEGREES
EKG T AXIS: 138 DEGREES
EKG VENTRICULAR RATE: 64 BPM
EKG VENTRICULAR RATE: 67 BPM
EKG VENTRICULAR RATE: 76 BPM
EKG VENTRICULAR RATE: 78 BPM
EKG VENTRICULAR RATE: 81 BPM
EOSINOPHILS ABSOLUTE: 0 K/UL (ref 0–0.6)
EOSINOPHILS ABSOLUTE: 0.1 K/UL (ref 0–0.6)
EOSINOPHILS RELATIVE PERCENT: 0.1 %
EOSINOPHILS RELATIVE PERCENT: 0.3 %
EOSINOPHILS RELATIVE PERCENT: 0.3 %
EOSINOPHILS RELATIVE PERCENT: 0.5 %
EOSINOPHILS RELATIVE PERCENT: 0.8 %
EOSINOPHILS RELATIVE PERCENT: 0.9 %
EOSINOPHILS RELATIVE PERCENT: 1.2 %
EPITHELIAL CELLS, UA: 1 /HPF (ref 0–5)
EPITHELIAL CELLS, UA: 1 /HPF (ref 0–5)
ESTIMATED AVERAGE GLUCOSE: 137 MG/DL
ESTIMATED AVERAGE GLUCOSE: 148.5 MG/DL
FIBRINOGEN: 317 MG/DL (ref 200–397)
GFR AFRICAN AMERICAN: 35
GFR AFRICAN AMERICAN: 42
GFR AFRICAN AMERICAN: 51
GFR AFRICAN AMERICAN: >60
GFR NON-AFRICAN AMERICAN: 29
GFR NON-AFRICAN AMERICAN: 35
GFR NON-AFRICAN AMERICAN: 42
GFR NON-AFRICAN AMERICAN: 54
GFR NON-AFRICAN AMERICAN: 59
GFR NON-AFRICAN AMERICAN: >60
GLOBULIN: 2.4 G/DL
GLOBULIN: 2.7 G/DL
GLOBULIN: 2.8 G/DL
GLOBULIN: 2.8 G/DL
GLUCOSE BLD-MCNC: 125 MG/DL (ref 70–99)
GLUCOSE BLD-MCNC: 128 MG/DL (ref 70–99)
GLUCOSE BLD-MCNC: 134 MG/DL (ref 70–99)
GLUCOSE BLD-MCNC: 137 MG/DL (ref 70–99)
GLUCOSE BLD-MCNC: 139 MG/DL (ref 70–99)
GLUCOSE BLD-MCNC: 140 MG/DL (ref 70–99)
GLUCOSE BLD-MCNC: 141 MG/DL (ref 70–99)
GLUCOSE BLD-MCNC: 141 MG/DL (ref 70–99)
GLUCOSE BLD-MCNC: 144 MG/DL (ref 70–99)
GLUCOSE BLD-MCNC: 145 MG/DL (ref 70–99)
GLUCOSE BLD-MCNC: 147 MG/DL (ref 70–99)
GLUCOSE BLD-MCNC: 154 MG/DL (ref 70–99)
GLUCOSE BLD-MCNC: 157 MG/DL
GLUCOSE BLD-MCNC: 157 MG/DL (ref 70–99)
GLUCOSE BLD-MCNC: 157 MG/DL (ref 70–99)
GLUCOSE BLD-MCNC: 165 MG/DL (ref 70–99)
GLUCOSE BLD-MCNC: 168 MG/DL (ref 70–99)
GLUCOSE BLD-MCNC: 197 MG/DL (ref 70–99)
GLUCOSE BLD-MCNC: 198 MG/DL (ref 70–99)
GLUCOSE URINE: NEGATIVE MG/DL
GLUCOSE URINE: NEGATIVE MG/DL
HBA1C MFR BLD: 6.4 %
HBA1C MFR BLD: 6.8 %
HCO3 ARTERIAL: 19.1 MMOL/L (ref 21–29)
HCO3 ARTERIAL: 20 MMOL/L (ref 21–29)
HCO3 ARTERIAL: 21.8 MMOL/L (ref 21–29)
HCO3 ARTERIAL: 22.1 MMOL/L (ref 21–29)
HCO3 ARTERIAL: 23.6 MMOL/L (ref 21–29)
HCO3 ARTERIAL: 24.9 MMOL/L (ref 21–29)
HCO3 ARTERIAL: 25.8 MMOL/L (ref 21–29)
HCT VFR BLD CALC: 27.1 % (ref 40.5–52.5)
HCT VFR BLD CALC: 27.7 % (ref 40.5–52.5)
HCT VFR BLD CALC: 30.4 % (ref 40.5–52.5)
HCT VFR BLD CALC: 30.5 % (ref 40.5–52.5)
HCT VFR BLD CALC: 30.8 % (ref 40.5–52.5)
HCT VFR BLD CALC: 31.1 % (ref 40.5–52.5)
HCT VFR BLD CALC: 31.1 % (ref 40.5–52.5)
HCT VFR BLD CALC: 31.3 % (ref 40.5–52.5)
HCT VFR BLD CALC: 31.4 % (ref 40.5–52.5)
HCT VFR BLD CALC: 31.7 % (ref 40.5–52.5)
HCT VFR BLD CALC: 32.2 % (ref 40.5–52.5)
HCT VFR BLD CALC: 35.1 % (ref 40.5–52.5)
HCT VFR BLD CALC: 35.3 % (ref 40.5–52.5)
HCT VFR BLD CALC: 35.5 % (ref 40.5–52.5)
HCT VFR BLD CALC: 35.8 % (ref 40.5–52.5)
HCT VFR BLD CALC: 36.9 % (ref 40.5–52.5)
HCT VFR BLD CALC: 37.2 % (ref 40.5–52.5)
HCT VFR BLD CALC: 37.3 % (ref 40.5–52.5)
HCT VFR BLD CALC: 38.4 % (ref 40.5–52.5)
HCT VFR BLD CALC: 39.4 % (ref 40.5–52.5)
HCT VFR BLD CALC: 43.6 % (ref 40.5–52.5)
HCT VFR BLD CALC: 45.5 % (ref 40.5–52.5)
HCT VFR BLD CALC: 45.8 % (ref 40.5–52.5)
HCT VFR BLD CALC: 46.6 % (ref 40.5–52.5)
HDLC SERPL-MCNC: 30 MG/DL (ref 40–60)
HDLC SERPL-MCNC: 31 MG/DL (ref 40–60)
HEMOGLOBIN, ART, EXTENDED: 10 G/DL (ref 13.5–17.5)
HEMOGLOBIN, ART, EXTENDED: 12.3 G/DL (ref 13.5–17.5)
HEMOGLOBIN, ART, EXTENDED: 9.2 G/DL (ref 13.5–17.5)
HEMOGLOBIN: 10.2 G/DL (ref 13.5–17.5)
HEMOGLOBIN: 10.4 G/DL (ref 13.5–17.5)
HEMOGLOBIN: 10.4 GM/DL (ref 13.5–17.5)
HEMOGLOBIN: 10.5 G/DL (ref 13.5–17.5)
HEMOGLOBIN: 10.5 G/DL (ref 13.5–17.5)
HEMOGLOBIN: 10.7 GM/DL (ref 13.5–17.5)
HEMOGLOBIN: 10.8 G/DL (ref 13.5–17.5)
HEMOGLOBIN: 10.8 G/DL (ref 13.5–17.5)
HEMOGLOBIN: 11 G/DL (ref 13.5–17.5)
HEMOGLOBIN: 11 G/DL (ref 13.5–17.5)
HEMOGLOBIN: 11.1 G/DL (ref 13.5–17.5)
HEMOGLOBIN: 11.2 G/DL (ref 13.5–17.5)
HEMOGLOBIN: 11.5 G/DL (ref 13.5–17.5)
HEMOGLOBIN: 12.3 G/DL (ref 13.5–17.5)
HEMOGLOBIN: 12.8 G/DL (ref 13.5–17.5)
HEMOGLOBIN: 12.8 G/DL (ref 13.5–17.5)
HEMOGLOBIN: 12.9 G/DL (ref 13.5–17.5)
HEMOGLOBIN: 13.3 G/DL (ref 13.5–17.5)
HEMOGLOBIN: 13.6 G/DL (ref 13.5–17.5)
HEMOGLOBIN: 15.1 G/DL (ref 13.5–17.5)
HEMOGLOBIN: 15.6 G/DL (ref 13.5–17.5)
HEMOGLOBIN: 16 G/DL (ref 13.5–17.5)
HEMOGLOBIN: 16.3 G/DL (ref 13.5–17.5)
HEMOGLOBIN: 16.3 GM/DL (ref 13.5–17.5)
HEMOGLOBIN: 9.3 G/DL (ref 13.5–17.5)
HYALINE CASTS: 3 /LPF (ref 0–8)
HYALINE CASTS: 6 /LPF (ref 0–8)
INR BLD: 1.07 (ref 0.86–1.14)
INR BLD: 1.2 (ref 0.86–1.14)
KETONES, URINE: 15 MG/DL
KETONES, URINE: NEGATIVE MG/DL
LACTATE: 0.54 MMOL/L (ref 0.4–2)
LACTATE: 0.67 MMOL/L (ref 0.4–2)
LACTATE: 0.97 MMOL/L (ref 0.4–2)
LDL CHOLESTEROL CALCULATED: 116 MG/DL
LDL CHOLESTEROL CALCULATED: 127 MG/DL
LEUKOCYTE ESTERASE, URINE: NEGATIVE
LEUKOCYTE ESTERASE, URINE: NEGATIVE
LV EF: 70 %
LVEF MODALITY: NORMAL
LYMPHOCYTES ABSOLUTE: 0.5 K/UL (ref 1–5.1)
LYMPHOCYTES ABSOLUTE: 0.7 K/UL (ref 1–5.1)
LYMPHOCYTES ABSOLUTE: 0.8 K/UL (ref 1–5.1)
LYMPHOCYTES ABSOLUTE: 0.9 K/UL (ref 1–5.1)
LYMPHOCYTES RELATIVE PERCENT: 10 %
LYMPHOCYTES RELATIVE PERCENT: 10.8 %
LYMPHOCYTES RELATIVE PERCENT: 12.7 %
LYMPHOCYTES RELATIVE PERCENT: 15.6 %
LYMPHOCYTES RELATIVE PERCENT: 5.2 %
LYMPHOCYTES RELATIVE PERCENT: 6.1 %
LYMPHOCYTES RELATIVE PERCENT: 9.8 %
MAGNESIUM: 2.3 MG/DL (ref 1.8–2.4)
MAGNESIUM: 2.5 MG/DL (ref 1.8–2.4)
MAGNESIUM: 2.5 MG/DL (ref 1.8–2.4)
MCH RBC QN AUTO: 30.3 PG (ref 26–34)
MCH RBC QN AUTO: 30.4 PG (ref 26–34)
MCH RBC QN AUTO: 30.5 PG (ref 26–34)
MCH RBC QN AUTO: 30.5 PG (ref 26–34)
MCH RBC QN AUTO: 30.6 PG (ref 26–34)
MCH RBC QN AUTO: 30.8 PG (ref 26–34)
MCH RBC QN AUTO: 31 PG (ref 26–34)
MCH RBC QN AUTO: 31.1 PG (ref 26–34)
MCH RBC QN AUTO: 31.2 PG (ref 26–34)
MCHC RBC AUTO-ENTMCNC: 33.7 G/DL (ref 31–36)
MCHC RBC AUTO-ENTMCNC: 34.3 G/DL (ref 31–36)
MCHC RBC AUTO-ENTMCNC: 34.3 G/DL (ref 31–36)
MCHC RBC AUTO-ENTMCNC: 34.4 G/DL (ref 31–36)
MCHC RBC AUTO-ENTMCNC: 34.6 G/DL (ref 31–36)
MCHC RBC AUTO-ENTMCNC: 34.7 G/DL (ref 31–36)
MCHC RBC AUTO-ENTMCNC: 34.8 G/DL (ref 31–36)
MCHC RBC AUTO-ENTMCNC: 34.8 G/DL (ref 31–36)
MCHC RBC AUTO-ENTMCNC: 34.9 G/DL (ref 31–36)
MCV RBC AUTO: 88 FL (ref 80–100)
MCV RBC AUTO: 88.1 FL (ref 80–100)
MCV RBC AUTO: 88.6 FL (ref 80–100)
MCV RBC AUTO: 88.7 FL (ref 80–100)
MCV RBC AUTO: 88.7 FL (ref 80–100)
MCV RBC AUTO: 88.9 FL (ref 80–100)
MCV RBC AUTO: 89.2 FL (ref 80–100)
MCV RBC AUTO: 89.9 FL (ref 80–100)
MCV RBC AUTO: 90.9 FL (ref 80–100)
METHEMOGLOBIN ARTERIAL: 0.3 %
METHEMOGLOBIN ARTERIAL: 0.5 %
METHEMOGLOBIN ARTERIAL: ABNORMAL %
MICROSCOPIC EXAMINATION: YES
MICROSCOPIC EXAMINATION: YES
MONOCYTES ABSOLUTE: 0.4 K/UL (ref 0–1.3)
MONOCYTES ABSOLUTE: 0.4 K/UL (ref 0–1.3)
MONOCYTES ABSOLUTE: 0.6 K/UL (ref 0–1.3)
MONOCYTES ABSOLUTE: 0.7 K/UL (ref 0–1.3)
MONOCYTES ABSOLUTE: 1.1 K/UL (ref 0–1.3)
MONOCYTES RELATIVE PERCENT: 6 %
MONOCYTES RELATIVE PERCENT: 6.9 %
MONOCYTES RELATIVE PERCENT: 7 %
MONOCYTES RELATIVE PERCENT: 7.1 %
MONOCYTES RELATIVE PERCENT: 8 %
MONOCYTES RELATIVE PERCENT: 8 %
MONOCYTES RELATIVE PERCENT: 9.4 %
NEUTROPHILS ABSOLUTE: 11.9 K/UL (ref 1.7–7.7)
NEUTROPHILS ABSOLUTE: 4.7 K/UL (ref 1.7–7.7)
NEUTROPHILS ABSOLUTE: 4.9 K/UL (ref 1.7–7.7)
NEUTROPHILS ABSOLUTE: 5.4 K/UL (ref 1.7–7.7)
NEUTROPHILS ABSOLUTE: 6.8 K/UL (ref 1.7–7.7)
NEUTROPHILS ABSOLUTE: 7.3 K/UL (ref 1.7–7.7)
NEUTROPHILS ABSOLUTE: 7.8 K/UL (ref 1.7–7.7)
NEUTROPHILS RELATIVE PERCENT: 76.7 %
NEUTROPHILS RELATIVE PERCENT: 79.2 %
NEUTROPHILS RELATIVE PERCENT: 79.9 %
NEUTROPHILS RELATIVE PERCENT: 81.4 %
NEUTROPHILS RELATIVE PERCENT: 81.8 %
NEUTROPHILS RELATIVE PERCENT: 85.6 %
NEUTROPHILS RELATIVE PERCENT: 86.9 %
NITRITE, URINE: NEGATIVE
NITRITE, URINE: NEGATIVE
O2 CONTENT ARTERIAL: 13 ML/DL
O2 CONTENT ARTERIAL: 16 ML/DL
O2 CONTENT ARTERIAL: 5 ML/DL
O2 SAT, ARTERIAL: 42.2 %
O2 SAT, ARTERIAL: 78 % (ref 93–100)
O2 SAT, ARTERIAL: 93 % (ref 93–100)
O2 SAT, ARTERIAL: 94 % (ref 93–100)
O2 SAT, ARTERIAL: 95 % (ref 93–100)
O2 SAT, ARTERIAL: 96.6 %
O2 SAT, ARTERIAL: 96.8 %
O2 THERAPY: ABNORMAL
PCO2 ARTERIAL: 137.5 MM HG (ref 35–45)
PCO2 ARTERIAL: 28.7 MMHG (ref 35–45)
PCO2 ARTERIAL: 29.4 MMHG (ref 35–45)
PCO2 ARTERIAL: 35.9 MM HG (ref 35–45)
PCO2 ARTERIAL: 37.1 MM HG (ref 35–45)
PCO2 ARTERIAL: 44.3 MMHG (ref 35–45)
PCO2 ARTERIAL: 56 MM HG (ref 35–45)
PDW BLD-RTO: 13.6 % (ref 12.4–15.4)
PDW BLD-RTO: 13.8 % (ref 12.4–15.4)
PDW BLD-RTO: 13.8 % (ref 12.4–15.4)
PDW BLD-RTO: 13.9 % (ref 12.4–15.4)
PDW BLD-RTO: 14 % (ref 12.4–15.4)
PDW BLD-RTO: 14.1 % (ref 12.4–15.4)
PDW BLD-RTO: 14.1 % (ref 12.4–15.4)
PDW BLD-RTO: 14.2 % (ref 12.4–15.4)
PDW BLD-RTO: 14.2 % (ref 12.4–15.4)
PERFORMED ON: ABNORMAL
PH ARTERIAL: 6.88 (ref 7.35–7.45)
PH ARTERIAL: 7.2 (ref 7.35–7.45)
PH ARTERIAL: 7.3 (ref 7.35–7.45)
PH ARTERIAL: 7.41 (ref 7.35–7.45)
PH ARTERIAL: 7.43 (ref 7.35–7.45)
PH ARTERIAL: 7.44 (ref 7.35–7.45)
PH ARTERIAL: 7.45 (ref 7.35–7.45)
PH UA: 5.5 (ref 5–8)
PH UA: 5.5 (ref 5–8)
PHOSPHORUS: 2.3 MG/DL (ref 2.5–4.9)
PHOSPHORUS: 2.7 MG/DL (ref 2.5–4.9)
PHOSPHORUS: 2.9 MG/DL (ref 2.5–4.9)
PHOSPHORUS: 3 MG/DL (ref 2.5–4.9)
PLATELET # BLD: 125 K/UL (ref 135–450)
PLATELET # BLD: 133 K/UL (ref 135–450)
PLATELET # BLD: 140 K/UL (ref 135–450)
PLATELET # BLD: 145 K/UL (ref 135–450)
PLATELET # BLD: 156 K/UL (ref 135–450)
PLATELET # BLD: 179 K/UL (ref 135–450)
PLATELET # BLD: 181 K/UL (ref 135–450)
PLATELET # BLD: 217 K/UL (ref 135–450)
PLATELET # BLD: 235 K/UL (ref 135–450)
PMV BLD AUTO: 7.9 FL (ref 5–10.5)
PMV BLD AUTO: 8 FL (ref 5–10.5)
PMV BLD AUTO: 8.2 FL (ref 5–10.5)
PMV BLD AUTO: 8.3 FL (ref 5–10.5)
PMV BLD AUTO: 8.4 FL (ref 5–10.5)
PMV BLD AUTO: 8.7 FL (ref 5–10.5)
PMV BLD AUTO: 8.7 FL (ref 5–10.5)
PO2 ARTERIAL: 67.4 MM HG (ref 75–108)
PO2 ARTERIAL: 73.8 MM HG (ref 75–108)
PO2 ARTERIAL: 75.8 MM HG (ref 75–108)
PO2 ARTERIAL: 76.8 MMHG (ref 75–108)
PO2 ARTERIAL: 79 MMHG (ref 75–108)
PO2 ARTERIAL: 82.5 MM HG (ref 75–108)
PO2 ARTERIAL: ABNORMAL MMHG (ref 75–108)
POC HEMATOCRIT: 31 % (ref 40.5–52.5)
POC HEMATOCRIT: 32 % (ref 40.5–52.5)
POC HEMATOCRIT: 48 % (ref 40.5–52.5)
POC POTASSIUM: 3.7 MMOL/L (ref 3.5–5.1)
POC POTASSIUM: 3.8 MMOL/L (ref 3.5–5.1)
POC POTASSIUM: 4.2 MMOL/L (ref 3.5–5.1)
POC SAMPLE TYPE: ABNORMAL
POC SODIUM: 140 MMOL/L (ref 136–145)
POC SODIUM: 141 MMOL/L (ref 136–145)
POC SODIUM: 142 MMOL/L (ref 136–145)
POTASSIUM REFLEX MAGNESIUM: 3.9 MMOL/L (ref 3.5–5.1)
POTASSIUM REFLEX MAGNESIUM: 3.9 MMOL/L (ref 3.5–5.1)
POTASSIUM SERPL-SCNC: 3.2 MMOL/L (ref 3.5–5.1)
POTASSIUM SERPL-SCNC: 3.4 MMOL/L (ref 3.5–5.1)
POTASSIUM SERPL-SCNC: 3.5 MMOL/L (ref 3.5–5.1)
POTASSIUM SERPL-SCNC: 3.7 MMOL/L (ref 3.5–5.1)
POTASSIUM SERPL-SCNC: 3.7 MMOL/L (ref 3.5–5.1)
POTASSIUM SERPL-SCNC: 3.8 MMOL/L (ref 3.5–5.1)
POTASSIUM SERPL-SCNC: 4.2 MMOL/L (ref 3.5–5.1)
POTASSIUM SERPL-SCNC: 4.2 MMOL/L (ref 3.5–5.1)
POTASSIUM SERPL-SCNC: 4.4 MMOL/L (ref 3.5–5.1)
PROCALCITONIN: 1.07 NG/ML (ref 0–0.15)
PROSTATE SPECIFIC ANTIGEN: 0.71 NG/ML (ref 0–4)
PROTEIN UA: 30 MG/DL
PROTEIN UA: ABNORMAL MG/DL
PROTHROMBIN TIME: 12.4 SEC (ref 10–13.2)
PROTHROMBIN TIME: 14 SEC (ref 10–13.2)
RBC # BLD: 2.98 M/UL (ref 4.2–5.9)
RBC # BLD: 3.46 M/UL (ref 4.2–5.9)
RBC # BLD: 4.02 M/UL (ref 4.2–5.9)
RBC # BLD: 4.19 M/UL (ref 4.2–5.9)
RBC # BLD: 4.45 M/UL (ref 4.2–5.9)
RBC # BLD: 4.92 M/UL (ref 4.2–5.9)
RBC # BLD: 5.14 M/UL (ref 4.2–5.9)
RBC # BLD: 5.15 M/UL (ref 4.2–5.9)
RBC # BLD: 5.22 M/UL (ref 4.2–5.9)
RBC UA: 191 /HPF (ref 0–4)
RBC UA: 3 /HPF (ref 0–4)
SODIUM BLD-SCNC: 137 MMOL/L (ref 136–145)
SODIUM BLD-SCNC: 138 MMOL/L (ref 136–145)
SODIUM BLD-SCNC: 139 MMOL/L (ref 136–145)
SODIUM BLD-SCNC: 140 MMOL/L (ref 136–145)
SODIUM BLD-SCNC: 141 MMOL/L (ref 136–145)
SODIUM BLD-SCNC: 142 MMOL/L (ref 136–145)
SODIUM BLD-SCNC: 143 MMOL/L (ref 136–145)
SODIUM BLD-SCNC: 143 MMOL/L (ref 136–145)
SODIUM URINE: <20 MMOL/L
SPECIFIC GRAVITY UA: 1.02 (ref 1–1.03)
SPECIFIC GRAVITY UA: 1.02 (ref 1–1.03)
TCO2 ARTERIAL: 24 MMOL/L
TCO2 ARTERIAL: 25 MMOL/L
TCO2 ARTERIAL: 26 MMOL/L
TCO2 ARTERIAL: 30 MMOL/L
TCO2 ARTERIAL: 44.7 MMOL/L
TCO2 ARTERIAL: 46.9 MMOL/L
TCO2 ARTERIAL: 51.8 MMOL/L
TOTAL CK: 80 U/L (ref 39–308)
TOTAL PROTEIN: 6.1 G/DL (ref 6.4–8.2)
TOTAL PROTEIN: 6.4 G/DL (ref 6.4–8.2)
TOTAL PROTEIN: 7.1 G/DL (ref 6.4–8.2)
TOTAL PROTEIN: 7.2 G/DL (ref 6.4–8.2)
TRIGL SERPL-MCNC: 164 MG/DL (ref 0–150)
TRIGL SERPL-MCNC: 180 MG/DL (ref 0–150)
TROPONIN: 0.03 NG/ML
TROPONIN: 0.03 NG/ML
TROPONIN: 0.05 NG/ML
TROPONIN: 0.05 NG/ML
TROPONIN: 0.06 NG/ML
TROPONIN: <0.01 NG/ML
TROPONIN: <0.01 NG/ML
TSH REFLEX: 1.35 UIU/ML (ref 0.27–4.2)
URINE REFLEX TO CULTURE: ABNORMAL
URINE REFLEX TO CULTURE: ABNORMAL
URINE TYPE: ABNORMAL
URINE TYPE: ABNORMAL
UROBILINOGEN, URINE: 0.2 E.U./DL
UROBILINOGEN, URINE: 1 E.U./DL
VITAMIN B-12: 560 PG/ML (ref 211–911)
VLDLC SERPL CALC-MCNC: 33 MG/DL
VLDLC SERPL CALC-MCNC: 36 MG/DL
WBC # BLD: 13.3 K/UL (ref 4–11)
WBC # BLD: 13.6 K/UL (ref 4–11)
WBC # BLD: 13.9 K/UL (ref 4–11)
WBC # BLD: 6.1 K/UL (ref 4–11)
WBC # BLD: 6.2 K/UL (ref 4–11)
WBC # BLD: 6.7 K/UL (ref 4–11)
WBC # BLD: 8.3 K/UL (ref 4–11)
WBC # BLD: 8.9 K/UL (ref 4–11)
WBC # BLD: 9 K/UL (ref 4–11)
WBC UA: 1 /HPF (ref 0–5)
WBC UA: 4 /HPF (ref 0–5)

## 2020-01-01 PROCEDURE — 6370000000 HC RX 637 (ALT 250 FOR IP): Performed by: INTERNAL MEDICINE

## 2020-01-01 PROCEDURE — 99222 1ST HOSP IP/OBS MODERATE 55: CPT | Performed by: INTERNAL MEDICINE

## 2020-01-01 PROCEDURE — 6360000004 HC RX CONTRAST MEDICATION: Performed by: STUDENT IN AN ORGANIZED HEALTH CARE EDUCATION/TRAINING PROGRAM

## 2020-01-01 PROCEDURE — 94760 N-INVAS EAR/PLS OXIMETRY 1: CPT

## 2020-01-01 PROCEDURE — C9113 INJ PANTOPRAZOLE SODIUM, VIA: HCPCS | Performed by: INTERNAL MEDICINE

## 2020-01-01 PROCEDURE — 97112 NEUROMUSCULAR REEDUCATION: CPT

## 2020-01-01 PROCEDURE — 97530 THERAPEUTIC ACTIVITIES: CPT

## 2020-01-01 PROCEDURE — 71046 X-RAY EXAM CHEST 2 VIEWS: CPT

## 2020-01-01 PROCEDURE — 6360000002 HC RX W HCPCS: Performed by: INTERNAL MEDICINE

## 2020-01-01 PROCEDURE — 82803 BLOOD GASES ANY COMBINATION: CPT

## 2020-01-01 PROCEDURE — 87324 CLOSTRIDIUM AG IA: CPT

## 2020-01-01 PROCEDURE — 81001 URINALYSIS AUTO W/SCOPE: CPT

## 2020-01-01 PROCEDURE — 2700000000 HC OXYGEN THERAPY PER DAY

## 2020-01-01 PROCEDURE — 97129 THER IVNTJ 1ST 15 MIN: CPT

## 2020-01-01 PROCEDURE — 2500000003 HC RX 250 WO HCPCS: Performed by: INTERNAL MEDICINE

## 2020-01-01 PROCEDURE — 02HV33Z INSERTION OF INFUSION DEVICE INTO SUPERIOR VENA CAVA, PERCUTANEOUS APPROACH: ICD-10-PCS | Performed by: INTERNAL MEDICINE

## 2020-01-01 PROCEDURE — 2580000003 HC RX 258: Performed by: INTERNAL MEDICINE

## 2020-01-01 PROCEDURE — G0103 PSA SCREENING: HCPCS

## 2020-01-01 PROCEDURE — 85610 PROTHROMBIN TIME: CPT

## 2020-01-01 PROCEDURE — 6370000000 HC RX 637 (ALT 250 FOR IP): Performed by: NURSE PRACTITIONER

## 2020-01-01 PROCEDURE — 36415 COLL VENOUS BLD VENIPUNCTURE: CPT

## 2020-01-01 PROCEDURE — 2000000000 HC ICU R&B

## 2020-01-01 PROCEDURE — 82947 ASSAY GLUCOSE BLOOD QUANT: CPT

## 2020-01-01 PROCEDURE — 6360000002 HC RX W HCPCS: Performed by: PHYSICAL MEDICINE & REHABILITATION

## 2020-01-01 PROCEDURE — 99291 CRITICAL CARE FIRST HOUR: CPT | Performed by: INTERNAL MEDICINE

## 2020-01-01 PROCEDURE — 82140 ASSAY OF AMMONIA: CPT

## 2020-01-01 PROCEDURE — 85025 COMPLETE CBC W/AUTO DIFF WBC: CPT

## 2020-01-01 PROCEDURE — 92526 ORAL FUNCTION THERAPY: CPT

## 2020-01-01 PROCEDURE — 85014 HEMATOCRIT: CPT

## 2020-01-01 PROCEDURE — 95816 EEG AWAKE AND DROWSY: CPT | Performed by: PSYCHIATRY & NEUROLOGY

## 2020-01-01 PROCEDURE — 94640 AIRWAY INHALATION TREATMENT: CPT

## 2020-01-01 PROCEDURE — 97167 OT EVAL HIGH COMPLEX 60 MIN: CPT

## 2020-01-01 PROCEDURE — 99285 EMERGENCY DEPT VISIT HI MDM: CPT

## 2020-01-01 PROCEDURE — 99232 SBSQ HOSP IP/OBS MODERATE 35: CPT | Performed by: PSYCHIATRY & NEUROLOGY

## 2020-01-01 PROCEDURE — 80053 COMPREHEN METABOLIC PANEL: CPT

## 2020-01-01 PROCEDURE — 2500000003 HC RX 250 WO HCPCS

## 2020-01-01 PROCEDURE — 94770 HC ETCO2 MONITOR DAILY: CPT

## 2020-01-01 PROCEDURE — 2060000000 HC ICU INTERMEDIATE R&B

## 2020-01-01 PROCEDURE — 93005 ELECTROCARDIOGRAM TRACING: CPT | Performed by: INTERNAL MEDICINE

## 2020-01-01 PROCEDURE — 7100000000 HC PACU RECOVERY - FIRST 15 MIN: Performed by: INTERNAL MEDICINE

## 2020-01-01 PROCEDURE — 6360000002 HC RX W HCPCS: Performed by: NURSE ANESTHETIST, CERTIFIED REGISTERED

## 2020-01-01 PROCEDURE — 82550 ASSAY OF CK (CPK): CPT

## 2020-01-01 PROCEDURE — 94750 HC PULMONARY COMPLIANCE STUDY: CPT

## 2020-01-01 PROCEDURE — 96375 TX/PRO/DX INJ NEW DRUG ADDON: CPT

## 2020-01-01 PROCEDURE — 83036 HEMOGLOBIN GLYCOSYLATED A1C: CPT

## 2020-01-01 PROCEDURE — 83605 ASSAY OF LACTIC ACID: CPT

## 2020-01-01 PROCEDURE — 92507 TX SP LANG VOICE COMM INDIV: CPT

## 2020-01-01 PROCEDURE — 93010 ELECTROCARDIOGRAM REPORT: CPT | Performed by: INTERNAL MEDICINE

## 2020-01-01 PROCEDURE — 86900 BLOOD TYPING SEROLOGIC ABO: CPT

## 2020-01-01 PROCEDURE — 97535 SELF CARE MNGMENT TRAINING: CPT

## 2020-01-01 PROCEDURE — 93005 ELECTROCARDIOGRAM TRACING: CPT | Performed by: STUDENT IN AN ORGANIZED HEALTH CARE EDUCATION/TRAINING PROGRAM

## 2020-01-01 PROCEDURE — 71045 X-RAY EXAM CHEST 1 VIEW: CPT

## 2020-01-01 PROCEDURE — 80069 RENAL FUNCTION PANEL: CPT

## 2020-01-01 PROCEDURE — 87449 NOS EACH ORGANISM AG IA: CPT

## 2020-01-01 PROCEDURE — 99233 SBSQ HOSP IP/OBS HIGH 50: CPT | Performed by: PSYCHIATRY & NEUROLOGY

## 2020-01-01 PROCEDURE — 84295 ASSAY OF SERUM SODIUM: CPT

## 2020-01-01 PROCEDURE — 85027 COMPLETE CBC AUTOMATED: CPT

## 2020-01-01 PROCEDURE — 84132 ASSAY OF SERUM POTASSIUM: CPT

## 2020-01-01 PROCEDURE — 83735 ASSAY OF MAGNESIUM: CPT

## 2020-01-01 PROCEDURE — 94761 N-INVAS EAR/PLS OXIMETRY MLT: CPT

## 2020-01-01 PROCEDURE — 2580000003 HC RX 258: Performed by: NURSE ANESTHETIST, CERTIFIED REGISTERED

## 2020-01-01 PROCEDURE — 2709999900 HC NON-CHARGEABLE SUPPLY: Performed by: INTERNAL MEDICINE

## 2020-01-01 PROCEDURE — 85018 HEMOGLOBIN: CPT

## 2020-01-01 PROCEDURE — 6370000000 HC RX 637 (ALT 250 FOR IP): Performed by: PHYSICAL MEDICINE & REHABILITATION

## 2020-01-01 PROCEDURE — 7100000001 HC PACU RECOVERY - ADDTL 15 MIN: Performed by: INTERNAL MEDICINE

## 2020-01-01 PROCEDURE — 74018 RADEX ABDOMEN 1 VIEW: CPT

## 2020-01-01 PROCEDURE — 70450 CT HEAD/BRAIN W/O DYE: CPT

## 2020-01-01 PROCEDURE — 96374 THER/PROPH/DIAG INJ IV PUSH: CPT

## 2020-01-01 PROCEDURE — 84484 ASSAY OF TROPONIN QUANT: CPT

## 2020-01-01 PROCEDURE — 92523 SPEECH SOUND LANG COMPREHEN: CPT

## 2020-01-01 PROCEDURE — 94003 VENT MGMT INPAT SUBQ DAY: CPT

## 2020-01-01 PROCEDURE — 31500 INSERT EMERGENCY AIRWAY: CPT | Performed by: INTERNAL MEDICINE

## 2020-01-01 PROCEDURE — 6370000000 HC RX 637 (ALT 250 FOR IP): Performed by: STUDENT IN AN ORGANIZED HEALTH CARE EDUCATION/TRAINING PROGRAM

## 2020-01-01 PROCEDURE — 1200000000 HC SEMI PRIVATE

## 2020-01-01 PROCEDURE — 82248 BILIRUBIN DIRECT: CPT

## 2020-01-01 PROCEDURE — 80048 BASIC METABOLIC PNL TOTAL CA: CPT

## 2020-01-01 PROCEDURE — 82330 ASSAY OF CALCIUM: CPT

## 2020-01-01 PROCEDURE — 70496 CT ANGIOGRAPHY HEAD: CPT

## 2020-01-01 PROCEDURE — 6360000002 HC RX W HCPCS: Performed by: STUDENT IN AN ORGANIZED HEALTH CARE EDUCATION/TRAINING PROGRAM

## 2020-01-01 PROCEDURE — 82570 ASSAY OF URINE CREATININE: CPT

## 2020-01-01 PROCEDURE — 84443 ASSAY THYROID STIM HORMONE: CPT

## 2020-01-01 PROCEDURE — 86850 RBC ANTIBODY SCREEN: CPT

## 2020-01-01 PROCEDURE — 5A1945Z RESPIRATORY VENTILATION, 24-96 CONSECUTIVE HOURS: ICD-10-PCS | Performed by: INTERNAL MEDICINE

## 2020-01-01 PROCEDURE — 99223 1ST HOSP IP/OBS HIGH 75: CPT | Performed by: PSYCHIATRY & NEUROLOGY

## 2020-01-01 PROCEDURE — 82607 VITAMIN B-12: CPT

## 2020-01-01 PROCEDURE — 3609013800 HC EGD SUBMUCOSAL/BOTOX INJECTION: Performed by: INTERNAL MEDICINE

## 2020-01-01 PROCEDURE — 6360000002 HC RX W HCPCS

## 2020-01-01 PROCEDURE — 3700000001 HC ADD 15 MINUTES (ANESTHESIA): Performed by: INTERNAL MEDICINE

## 2020-01-01 PROCEDURE — 94660 CPAP INITIATION&MGMT: CPT

## 2020-01-01 PROCEDURE — 1280000000 HC REHAB R&B

## 2020-01-01 PROCEDURE — 93306 TTE W/DOPPLER COMPLETE: CPT

## 2020-01-01 PROCEDURE — 84300 ASSAY OF URINE SODIUM: CPT

## 2020-01-01 PROCEDURE — 0BH17EZ INSERTION OF ENDOTRACHEAL AIRWAY INTO TRACHEA, VIA NATURAL OR ARTIFICIAL OPENING: ICD-10-PCS | Performed by: INTERNAL MEDICINE

## 2020-01-01 PROCEDURE — 84153 ASSAY OF PSA TOTAL: CPT

## 2020-01-01 PROCEDURE — 94002 VENT MGMT INPAT INIT DAY: CPT

## 2020-01-01 PROCEDURE — 3700000000 HC ANESTHESIA ATTENDED CARE: Performed by: INTERNAL MEDICINE

## 2020-01-01 PROCEDURE — C9248 INJ, CLEVIDIPINE BUTYRATE: HCPCS | Performed by: INTERNAL MEDICINE

## 2020-01-01 PROCEDURE — 95819 EEG AWAKE AND ASLEEP: CPT

## 2020-01-01 PROCEDURE — 85384 FIBRINOGEN ACTIVITY: CPT

## 2020-01-01 PROCEDURE — 86901 BLOOD TYPING SEROLOGIC RH(D): CPT

## 2020-01-01 PROCEDURE — 80061 LIPID PANEL: CPT

## 2020-01-01 PROCEDURE — 84145 PROCALCITONIN (PCT): CPT

## 2020-01-01 PROCEDURE — 6370000000 HC RX 637 (ALT 250 FOR IP): Performed by: PHYSICIAN ASSISTANT

## 2020-01-01 PROCEDURE — 70551 MRI BRAIN STEM W/O DYE: CPT

## 2020-01-01 PROCEDURE — 92610 EVALUATE SWALLOWING FUNCTION: CPT

## 2020-01-01 PROCEDURE — 0DJ08ZZ INSPECTION OF UPPER INTESTINAL TRACT, VIA NATURAL OR ARTIFICIAL OPENING ENDOSCOPIC: ICD-10-PCS | Performed by: INTERNAL MEDICINE

## 2020-01-01 PROCEDURE — 97163 PT EVAL HIGH COMPLEX 45 MIN: CPT

## 2020-01-01 PROCEDURE — 2500000003 HC RX 250 WO HCPCS: Performed by: NURSE ANESTHETIST, CERTIFIED REGISTERED

## 2020-01-01 PROCEDURE — 99233 SBSQ HOSP IP/OBS HIGH 50: CPT | Performed by: INTERNAL MEDICINE

## 2020-01-01 RX ORDER — TRAZODONE HYDROCHLORIDE 50 MG/1
50 TABLET ORAL NIGHTLY PRN
Status: DISCONTINUED | OUTPATIENT
Start: 2020-01-01 | End: 2020-01-01 | Stop reason: HOSPADM

## 2020-01-01 RX ORDER — POLYETHYLENE GLYCOL 3350 17 G/17G
17 POWDER, FOR SOLUTION ORAL DAILY PRN
Status: CANCELLED | OUTPATIENT
Start: 2020-01-01

## 2020-01-01 RX ORDER — SODIUM CHLORIDE 9 MG/ML
INJECTION, SOLUTION INTRAVENOUS CONTINUOUS
Status: DISCONTINUED | OUTPATIENT
Start: 2020-01-01 | End: 2020-01-01

## 2020-01-01 RX ORDER — LABETALOL 200 MG/1
200 TABLET, FILM COATED ORAL 3 TIMES DAILY
Status: DISCONTINUED | OUTPATIENT
Start: 2020-01-01 | End: 2020-01-01

## 2020-01-01 RX ORDER — LOSARTAN POTASSIUM 25 MG/1
50 TABLET ORAL DAILY
Status: DISCONTINUED | OUTPATIENT
Start: 2020-01-01 | End: 2020-01-01

## 2020-01-01 RX ORDER — CLOPIDOGREL BISULFATE 75 MG/1
75 TABLET ORAL DAILY
Status: DISCONTINUED | OUTPATIENT
Start: 2020-01-01 | End: 2020-01-01

## 2020-01-01 RX ORDER — AMLODIPINE BESYLATE 5 MG/1
10 TABLET ORAL DAILY
Status: DISCONTINUED | OUTPATIENT
Start: 2020-01-01 | End: 2020-01-01 | Stop reason: HOSPADM

## 2020-01-01 RX ORDER — EPINEPHRINE 0.1 MG/ML
SYRINGE (ML) INJECTION
Status: COMPLETED | OUTPATIENT
Start: 2020-01-01 | End: 2020-01-01

## 2020-01-01 RX ORDER — ATROPINE SULFATE 0.1 MG/ML
INJECTION INTRAVENOUS
Status: DISCONTINUED
Start: 2020-01-01 | End: 2020-01-01 | Stop reason: HOSPADM

## 2020-01-01 RX ORDER — POTASSIUM CHLORIDE 29.8 MG/ML
20 INJECTION INTRAVENOUS ONCE
Status: DISCONTINUED | OUTPATIENT
Start: 2020-01-01 | End: 2020-01-01 | Stop reason: SDUPTHER

## 2020-01-01 RX ORDER — GLYCOPYRROLATE 1 MG/5 ML
SYRINGE (ML) INTRAVENOUS PRN
Status: DISCONTINUED | OUTPATIENT
Start: 2020-01-01 | End: 2020-01-01 | Stop reason: SDUPTHER

## 2020-01-01 RX ORDER — LABETALOL HYDROCHLORIDE 5 MG/ML
10 INJECTION, SOLUTION INTRAVENOUS EVERY 6 HOURS PRN
Status: DISCONTINUED | OUTPATIENT
Start: 2020-01-01 | End: 2020-01-01

## 2020-01-01 RX ORDER — LIDOCAINE HYDROCHLORIDE 20 MG/ML
INJECTION, SOLUTION INFILTRATION; PERINEURAL PRN
Status: DISCONTINUED | OUTPATIENT
Start: 2020-01-01 | End: 2020-01-01 | Stop reason: SDUPTHER

## 2020-01-01 RX ORDER — HYDRALAZINE HYDROCHLORIDE 25 MG/1
50 TABLET, FILM COATED ORAL EVERY 8 HOURS SCHEDULED
Status: DISCONTINUED | OUTPATIENT
Start: 2020-01-01 | End: 2020-01-01

## 2020-01-01 RX ORDER — POLYETHYLENE GLYCOL 3350 17 G/17G
17 POWDER, FOR SOLUTION ORAL DAILY PRN
Status: DISCONTINUED | OUTPATIENT
Start: 2020-01-01 | End: 2020-01-01 | Stop reason: HOSPADM

## 2020-01-01 RX ORDER — VALSARTAN 160 MG/1
160 TABLET ORAL DAILY
Status: DISCONTINUED | OUTPATIENT
Start: 2020-01-01 | End: 2020-01-01

## 2020-01-01 RX ORDER — SODIUM CHLORIDE 450 MG/100ML
INJECTION, SOLUTION INTRAVENOUS CONTINUOUS
Status: DISCONTINUED | OUTPATIENT
Start: 2020-01-01 | End: 2020-01-01 | Stop reason: HOSPADM

## 2020-01-01 RX ORDER — LABETALOL 200 MG/1
200 TABLET, FILM COATED ORAL EVERY 12 HOURS SCHEDULED
Status: DISCONTINUED | OUTPATIENT
Start: 2020-01-01 | End: 2020-01-01

## 2020-01-01 RX ORDER — EPINEPHRINE 0.1 MG/ML
SYRINGE (ML) INJECTION PRN
Status: DISCONTINUED | OUTPATIENT
Start: 2020-01-01 | End: 2020-01-01 | Stop reason: HOSPADM

## 2020-01-01 RX ORDER — EPINEPHRINE 0.1 MG/ML
SYRINGE (ML) INJECTION
Status: DISCONTINUED
Start: 2020-01-01 | End: 2020-01-01 | Stop reason: HOSPADM

## 2020-01-01 RX ORDER — HYDRALAZINE HYDROCHLORIDE 20 MG/ML
10 INJECTION INTRAMUSCULAR; INTRAVENOUS EVERY 4 HOURS PRN
Status: DISCONTINUED | OUTPATIENT
Start: 2020-01-01 | End: 2020-01-01 | Stop reason: HOSPADM

## 2020-01-01 RX ORDER — LABETALOL HYDROCHLORIDE 5 MG/ML
10 INJECTION, SOLUTION INTRAVENOUS
Status: DISCONTINUED | OUTPATIENT
Start: 2020-01-01 | End: 2020-01-01

## 2020-01-01 RX ORDER — TRAZODONE HYDROCHLORIDE 50 MG/1
50 TABLET ORAL NIGHTLY PRN
Status: CANCELLED | OUTPATIENT
Start: 2020-01-01

## 2020-01-01 RX ORDER — HYDROCODONE BITARTRATE AND ACETAMINOPHEN 5; 325 MG/1; MG/1
2 TABLET ORAL EVERY 6 HOURS PRN
Status: CANCELLED | OUTPATIENT
Start: 2020-01-01

## 2020-01-01 RX ORDER — LABETALOL 200 MG/1
200 TABLET, FILM COATED ORAL EVERY 12 HOURS SCHEDULED
Qty: 60 TABLET | Refills: 3 | Status: SHIPPED | OUTPATIENT
Start: 2020-01-01

## 2020-01-01 RX ORDER — HYDROCODONE BITARTRATE AND ACETAMINOPHEN 5; 325 MG/1; MG/1
2 TABLET ORAL EVERY 6 HOURS PRN
Status: DISCONTINUED | OUTPATIENT
Start: 2020-01-01 | End: 2020-01-01 | Stop reason: HOSPADM

## 2020-01-01 RX ORDER — ACETAMINOPHEN 325 MG/1
650 TABLET ORAL EVERY 4 HOURS PRN
Status: CANCELLED | OUTPATIENT
Start: 2020-01-01

## 2020-01-01 RX ORDER — ATORVASTATIN CALCIUM 80 MG/1
80 TABLET, FILM COATED ORAL NIGHTLY
Status: DISCONTINUED | OUTPATIENT
Start: 2020-01-01 | End: 2020-01-01 | Stop reason: HOSPADM

## 2020-01-01 RX ORDER — HYDRALAZINE HYDROCHLORIDE 25 MG/1
25 TABLET, FILM COATED ORAL EVERY 8 HOURS SCHEDULED
Status: DISCONTINUED | OUTPATIENT
Start: 2020-01-01 | End: 2020-01-01

## 2020-01-01 RX ORDER — SODIUM CHLORIDE 0.9 % (FLUSH) 0.9 %
10 SYRINGE (ML) INJECTION EVERY 12 HOURS SCHEDULED
Status: DISCONTINUED | OUTPATIENT
Start: 2020-01-01 | End: 2020-01-01 | Stop reason: HOSPADM

## 2020-01-01 RX ORDER — PROPOFOL 10 MG/ML
10 INJECTION, EMULSION INTRAVENOUS
Status: DISCONTINUED | OUTPATIENT
Start: 2020-01-01 | End: 2020-01-01 | Stop reason: HOSPADM

## 2020-01-01 RX ORDER — ACETAMINOPHEN 650 MG/1
650 SUPPOSITORY RECTAL EVERY 6 HOURS PRN
Status: DISCONTINUED | OUTPATIENT
Start: 2020-01-01 | End: 2020-01-01 | Stop reason: HOSPADM

## 2020-01-01 RX ORDER — DIPHENHYDRAMINE HCL 25 MG
25 TABLET ORAL EVERY 6 HOURS PRN
Status: CANCELLED | OUTPATIENT
Start: 2020-01-01

## 2020-01-01 RX ORDER — ACETAMINOPHEN 325 MG/1
650 TABLET ORAL EVERY 6 HOURS PRN
Status: DISCONTINUED | OUTPATIENT
Start: 2020-01-01 | End: 2020-01-01

## 2020-01-01 RX ORDER — ALBUTEROL SULFATE 2.5 MG/3ML
2.5 SOLUTION RESPIRATORY (INHALATION) EVERY 6 HOURS PRN
Status: DISCONTINUED | OUTPATIENT
Start: 2020-01-01 | End: 2020-01-01 | Stop reason: HOSPADM

## 2020-01-01 RX ORDER — ATORVASTATIN CALCIUM 80 MG/1
80 TABLET, FILM COATED ORAL NIGHTLY
Status: CANCELLED | OUTPATIENT
Start: 2020-01-01

## 2020-01-01 RX ORDER — LABETALOL HYDROCHLORIDE 5 MG/ML
10 INJECTION, SOLUTION INTRAVENOUS EVERY 4 HOURS PRN
Status: DISCONTINUED | OUTPATIENT
Start: 2020-01-01 | End: 2020-01-01 | Stop reason: HOSPADM

## 2020-01-01 RX ORDER — LABETALOL HYDROCHLORIDE 5 MG/ML
10 INJECTION, SOLUTION INTRAVENOUS ONCE
Status: COMPLETED | OUTPATIENT
Start: 2020-01-01 | End: 2020-01-01

## 2020-01-01 RX ORDER — HYDRALAZINE HYDROCHLORIDE 25 MG/1
75 TABLET, FILM COATED ORAL EVERY 8 HOURS SCHEDULED
Status: DISCONTINUED | OUTPATIENT
Start: 2020-01-01 | End: 2020-01-01 | Stop reason: HOSPADM

## 2020-01-01 RX ORDER — SODIUM CHLORIDE 9 MG/ML
INJECTION, SOLUTION INTRAVENOUS CONTINUOUS PRN
Status: DISCONTINUED | OUTPATIENT
Start: 2020-01-01 | End: 2020-01-01 | Stop reason: SDUPTHER

## 2020-01-01 RX ORDER — SODIUM CHLORIDE 0.9 % (FLUSH) 0.9 %
10 SYRINGE (ML) INJECTION PRN
Status: DISCONTINUED | OUTPATIENT
Start: 2020-01-01 | End: 2020-01-01 | Stop reason: HOSPADM

## 2020-01-01 RX ORDER — MORPHINE SULFATE 2 MG/ML
2 INJECTION, SOLUTION INTRAMUSCULAR; INTRAVENOUS ONCE
Status: COMPLETED | OUTPATIENT
Start: 2020-01-01 | End: 2020-01-01

## 2020-01-01 RX ORDER — CLONIDINE HYDROCHLORIDE 0.1 MG/1
0.1 TABLET ORAL ONCE
Status: COMPLETED | OUTPATIENT
Start: 2020-01-01 | End: 2020-01-01

## 2020-01-01 RX ORDER — TRAMADOL HYDROCHLORIDE 50 MG/1
50 TABLET ORAL EVERY 6 HOURS PRN
Status: DISCONTINUED | OUTPATIENT
Start: 2020-01-01 | End: 2020-01-01 | Stop reason: HOSPADM

## 2020-01-01 RX ORDER — CLOPIDOGREL BISULFATE 75 MG/1
75 TABLET ORAL DAILY
Status: DISCONTINUED | OUTPATIENT
Start: 2020-01-01 | End: 2020-01-01 | Stop reason: HOSPADM

## 2020-01-01 RX ORDER — LACTULOSE 10 G/15ML
20 SOLUTION ORAL 3 TIMES DAILY
Status: DISCONTINUED | OUTPATIENT
Start: 2020-01-01 | End: 2020-01-01

## 2020-01-01 RX ORDER — HYDRALAZINE HYDROCHLORIDE 20 MG/ML
10 INJECTION INTRAMUSCULAR; INTRAVENOUS ONCE
Status: COMPLETED | OUTPATIENT
Start: 2020-01-01 | End: 2020-01-01

## 2020-01-01 RX ORDER — LABETALOL HYDROCHLORIDE 5 MG/ML
10 INJECTION, SOLUTION INTRAVENOUS EVERY 6 HOURS PRN
Status: DISCONTINUED | OUTPATIENT
Start: 2020-01-01 | End: 2020-01-01 | Stop reason: HOSPADM

## 2020-01-01 RX ORDER — HYDRALAZINE HYDROCHLORIDE 20 MG/ML
10 INJECTION INTRAMUSCULAR; INTRAVENOUS ONCE
Status: DISCONTINUED | OUTPATIENT
Start: 2020-01-01 | End: 2020-01-01 | Stop reason: RX

## 2020-01-01 RX ORDER — HYDRALAZINE HYDROCHLORIDE 20 MG/ML
10 INJECTION INTRAMUSCULAR; INTRAVENOUS EVERY 4 HOURS PRN
Status: DISCONTINUED | OUTPATIENT
Start: 2020-01-01 | End: 2020-01-01 | Stop reason: RX

## 2020-01-01 RX ORDER — LORAZEPAM 2 MG/ML
1 INJECTION INTRAMUSCULAR EVERY 4 HOURS PRN
Status: DISCONTINUED | OUTPATIENT
Start: 2020-01-01 | End: 2020-01-01 | Stop reason: HOSPADM

## 2020-01-01 RX ORDER — HYDRALAZINE HYDROCHLORIDE 20 MG/ML
10 INJECTION INTRAMUSCULAR; INTRAVENOUS EVERY 6 HOURS PRN
Status: DISCONTINUED | OUTPATIENT
Start: 2020-01-01 | End: 2020-01-01 | Stop reason: HOSPADM

## 2020-01-01 RX ORDER — PROPOFOL 10 MG/ML
INJECTION, EMULSION INTRAVENOUS
Status: COMPLETED
Start: 2020-01-01 | End: 2020-01-01

## 2020-01-01 RX ORDER — ONDANSETRON 2 MG/ML
4 INJECTION INTRAMUSCULAR; INTRAVENOUS EVERY 6 HOURS PRN
Status: DISCONTINUED | OUTPATIENT
Start: 2020-01-01 | End: 2020-01-01

## 2020-01-01 RX ORDER — ONDANSETRON 2 MG/ML
INJECTION INTRAMUSCULAR; INTRAVENOUS
Status: COMPLETED
Start: 2020-01-01 | End: 2020-01-01

## 2020-01-01 RX ORDER — HYDRALAZINE HYDROCHLORIDE 25 MG/1
50 TABLET, FILM COATED ORAL EVERY 8 HOURS PRN
Status: DISCONTINUED | OUTPATIENT
Start: 2020-01-01 | End: 2020-01-01 | Stop reason: HOSPADM

## 2020-01-01 RX ORDER — DEXMEDETOMIDINE HYDROCHLORIDE 4 UG/ML
0.2 INJECTION, SOLUTION INTRAVENOUS CONTINUOUS
Status: DISCONTINUED | OUTPATIENT
Start: 2020-01-01 | End: 2020-01-01 | Stop reason: HOSPADM

## 2020-01-01 RX ORDER — PROPOFOL 10 MG/ML
INJECTION, EMULSION INTRAVENOUS PRN
Status: DISCONTINUED | OUTPATIENT
Start: 2020-01-01 | End: 2020-01-01 | Stop reason: SDUPTHER

## 2020-01-01 RX ORDER — HYDRALAZINE HYDROCHLORIDE 20 MG/ML
5 INJECTION INTRAMUSCULAR; INTRAVENOUS EVERY 4 HOURS PRN
Status: DISCONTINUED | OUTPATIENT
Start: 2020-01-01 | End: 2020-01-01

## 2020-01-01 RX ORDER — CLONIDINE 0.1 MG/24H
1 PATCH, EXTENDED RELEASE TRANSDERMAL WEEKLY
Status: DISCONTINUED | OUTPATIENT
Start: 2020-01-01 | End: 2020-01-01

## 2020-01-01 RX ORDER — PROMETHAZINE HYDROCHLORIDE 25 MG/1
12.5 TABLET ORAL EVERY 6 HOURS PRN
Status: DISCONTINUED | OUTPATIENT
Start: 2020-01-01 | End: 2020-01-01 | Stop reason: HOSPADM

## 2020-01-01 RX ORDER — ACETAMINOPHEN 650 MG/1
650 SUPPOSITORY RECTAL EVERY 6 HOURS PRN
Status: DISCONTINUED | OUTPATIENT
Start: 2020-01-01 | End: 2020-01-01

## 2020-01-01 RX ORDER — SODIUM CHLORIDE 0.9 % (FLUSH) 0.9 %
10 SYRINGE (ML) INJECTION EVERY 12 HOURS SCHEDULED
Status: CANCELLED | OUTPATIENT
Start: 2020-01-01

## 2020-01-01 RX ORDER — HYDRALAZINE HYDROCHLORIDE 25 MG/1
75 TABLET, FILM COATED ORAL EVERY 8 HOURS SCHEDULED
Status: DISCONTINUED | OUTPATIENT
Start: 2020-01-01 | End: 2020-01-01

## 2020-01-01 RX ORDER — CLONIDINE 0.2 MG/24H
1 PATCH, EXTENDED RELEASE TRANSDERMAL WEEKLY
Status: DISCONTINUED | OUTPATIENT
Start: 2020-01-01 | End: 2020-01-01 | Stop reason: HOSPADM

## 2020-01-01 RX ORDER — POTASSIUM CHLORIDE 7.45 MG/ML
10 INJECTION INTRAVENOUS
Status: COMPLETED | OUTPATIENT
Start: 2020-01-01 | End: 2020-01-01

## 2020-01-01 RX ORDER — LABETALOL 200 MG/1
200 TABLET, FILM COATED ORAL EVERY 12 HOURS SCHEDULED
Status: DISCONTINUED | OUTPATIENT
Start: 2020-01-01 | End: 2020-01-01 | Stop reason: HOSPADM

## 2020-01-01 RX ORDER — PANTOPRAZOLE SODIUM 40 MG/10ML
40 INJECTION, POWDER, LYOPHILIZED, FOR SOLUTION INTRAVENOUS DAILY
Status: DISCONTINUED | OUTPATIENT
Start: 2020-01-01 | End: 2020-01-01 | Stop reason: HOSPADM

## 2020-01-01 RX ORDER — SODIUM CHLORIDE 9 MG/ML
INJECTION, SOLUTION INTRAVENOUS CONTINUOUS
Status: ACTIVE | OUTPATIENT
Start: 2020-01-01 | End: 2020-01-01

## 2020-01-01 RX ORDER — HYDRALAZINE HYDROCHLORIDE 25 MG/1
75 TABLET, FILM COATED ORAL EVERY 8 HOURS SCHEDULED
Qty: 90 TABLET | Refills: 3 | Status: SHIPPED | OUTPATIENT
Start: 2020-01-01

## 2020-01-01 RX ORDER — HYDROMORPHONE HYDROCHLORIDE 1 MG/ML
0.5 INJECTION, SOLUTION INTRAMUSCULAR; INTRAVENOUS; SUBCUTANEOUS EVERY 8 HOURS PRN
Status: DISCONTINUED | OUTPATIENT
Start: 2020-01-01 | End: 2020-01-01 | Stop reason: HOSPADM

## 2020-01-01 RX ORDER — ONDANSETRON 4 MG/1
4 TABLET, ORALLY DISINTEGRATING ORAL EVERY 8 HOURS PRN
Status: DISCONTINUED | OUTPATIENT
Start: 2020-01-01 | End: 2020-01-01 | Stop reason: HOSPADM

## 2020-01-01 RX ORDER — LABETALOL 200 MG/1
200 TABLET, FILM COATED ORAL EVERY 12 HOURS SCHEDULED
Status: CANCELLED | OUTPATIENT
Start: 2020-01-01

## 2020-01-01 RX ORDER — LABETALOL 200 MG/1
100 TABLET, FILM COATED ORAL EVERY 12 HOURS SCHEDULED
Status: DISCONTINUED | OUTPATIENT
Start: 2020-01-01 | End: 2020-01-01

## 2020-01-01 RX ORDER — DIPHENHYDRAMINE HCL 25 MG
25 TABLET ORAL EVERY 6 HOURS PRN
Status: DISCONTINUED | OUTPATIENT
Start: 2020-01-01 | End: 2020-01-01 | Stop reason: HOSPADM

## 2020-01-01 RX ORDER — HYDRALAZINE HYDROCHLORIDE 25 MG/1
50 TABLET, FILM COATED ORAL EVERY 8 HOURS PRN
Status: CANCELLED | OUTPATIENT
Start: 2020-01-01

## 2020-01-01 RX ORDER — ACETAMINOPHEN 325 MG/1
650 TABLET ORAL EVERY 4 HOURS PRN
Status: DISCONTINUED | OUTPATIENT
Start: 2020-01-01 | End: 2020-01-01 | Stop reason: HOSPADM

## 2020-01-01 RX ORDER — ACETAMINOPHEN 325 MG/1
650 TABLET ORAL EVERY 6 HOURS PRN
Status: DISCONTINUED | OUTPATIENT
Start: 2020-01-01 | End: 2020-01-01 | Stop reason: HOSPADM

## 2020-01-01 RX ORDER — ONDANSETRON 4 MG/1
4 TABLET, ORALLY DISINTEGRATING ORAL EVERY 8 HOURS PRN
Status: CANCELLED | OUTPATIENT
Start: 2020-01-01

## 2020-01-01 RX ORDER — LABETALOL HYDROCHLORIDE 5 MG/ML
10 INJECTION, SOLUTION INTRAVENOUS EVERY 4 HOURS
Status: DISCONTINUED | OUTPATIENT
Start: 2020-01-01 | End: 2020-01-01 | Stop reason: HOSPADM

## 2020-01-01 RX ORDER — AMLODIPINE BESYLATE 5 MG/1
10 TABLET ORAL DAILY
Status: CANCELLED | OUTPATIENT
Start: 2020-01-01

## 2020-01-01 RX ORDER — PROCHLORPERAZINE EDISYLATE 5 MG/ML
10 INJECTION INTRAMUSCULAR; INTRAVENOUS EVERY 6 HOURS PRN
Status: DISCONTINUED | OUTPATIENT
Start: 2020-01-01 | End: 2020-01-01 | Stop reason: HOSPADM

## 2020-01-01 RX ORDER — SODIUM CHLORIDE 0.9 % (FLUSH) 0.9 %
10 SYRINGE (ML) INJECTION PRN
Status: CANCELLED | OUTPATIENT
Start: 2020-01-01

## 2020-01-01 RX ORDER — ASPIRIN 325 MG
325 TABLET ORAL ONCE
Status: COMPLETED | OUTPATIENT
Start: 2020-01-01 | End: 2020-01-01

## 2020-01-01 RX ORDER — ONDANSETRON 2 MG/ML
4 INJECTION INTRAMUSCULAR; INTRAVENOUS EVERY 6 HOURS PRN
Status: DISCONTINUED | OUTPATIENT
Start: 2020-01-01 | End: 2020-01-01 | Stop reason: HOSPADM

## 2020-01-01 RX ORDER — ATROPINE SULFATE 0.4 MG/ML
0.4 AMPUL (ML) INJECTION ONCE
Status: COMPLETED | OUTPATIENT
Start: 2020-01-01 | End: 2020-01-01

## 2020-01-01 RX ORDER — AMLODIPINE BESYLATE 5 MG/1
10 TABLET ORAL DAILY
Status: DISCONTINUED | OUTPATIENT
Start: 2020-01-01 | End: 2020-01-01

## 2020-01-01 RX ORDER — MORPHINE SULFATE 2 MG/ML
2 INJECTION, SOLUTION INTRAMUSCULAR; INTRAVENOUS EVERY 4 HOURS PRN
Status: DISCONTINUED | OUTPATIENT
Start: 2020-01-01 | End: 2020-01-01 | Stop reason: HOSPADM

## 2020-01-01 RX ORDER — TRAMADOL HYDROCHLORIDE 50 MG/1
50 TABLET ORAL EVERY 6 HOURS PRN
Status: CANCELLED | OUTPATIENT
Start: 2020-01-01

## 2020-01-01 RX ORDER — HYDRALAZINE HYDROCHLORIDE 25 MG/1
75 TABLET, FILM COATED ORAL EVERY 8 HOURS SCHEDULED
Status: CANCELLED | OUTPATIENT
Start: 2020-01-01

## 2020-01-01 RX ORDER — CLONIDINE HYDROCHLORIDE 0.1 MG/1
0.1 TABLET ORAL 2 TIMES DAILY
Status: DISCONTINUED | OUTPATIENT
Start: 2020-01-01 | End: 2020-01-01

## 2020-01-01 RX ORDER — PROMETHAZINE HYDROCHLORIDE 25 MG/ML
6.25 INJECTION, SOLUTION INTRAMUSCULAR; INTRAVENOUS ONCE
Status: COMPLETED | OUTPATIENT
Start: 2020-01-01 | End: 2020-01-01

## 2020-01-01 RX ORDER — ASPIRIN 81 MG/1
81 TABLET ORAL
Status: DISCONTINUED | OUTPATIENT
Start: 2020-01-01 | End: 2020-01-01

## 2020-01-01 RX ORDER — SODIUM CHLORIDE FOR INHALATION 0.9 %
3 VIAL, NEBULIZER (ML) INHALATION EVERY 4 HOURS PRN
Status: DISCONTINUED | OUTPATIENT
Start: 2020-01-01 | End: 2020-01-01 | Stop reason: HOSPADM

## 2020-01-01 RX ORDER — PROPOFOL 10 MG/ML
INJECTION, EMULSION INTRAVENOUS CONTINUOUS PRN
Status: DISCONTINUED | OUTPATIENT
Start: 2020-01-01 | End: 2020-01-01 | Stop reason: SDUPTHER

## 2020-01-01 RX ORDER — ROSUVASTATIN CALCIUM 10 MG/1
1 TABLET, COATED ORAL DAILY
Status: DISCONTINUED | OUTPATIENT
Start: 2020-01-01 | End: 2020-01-01 | Stop reason: ALTCHOICE

## 2020-01-01 RX ORDER — MORPHINE SULFATE 2 MG/ML
INJECTION, SOLUTION INTRAMUSCULAR; INTRAVENOUS
Status: DISCONTINUED
Start: 2020-01-01 | End: 2020-01-01 | Stop reason: WASHOUT

## 2020-01-01 RX ADMIN — Medication 10 ML: at 09:59

## 2020-01-01 RX ADMIN — SODIUM CHLORIDE 8 MG/HR: 9 INJECTION, SOLUTION INTRAVENOUS at 06:19

## 2020-01-01 RX ADMIN — LABETALOL HYDROCHLORIDE 10 MG: 5 INJECTION, SOLUTION INTRAVENOUS at 18:07

## 2020-01-01 RX ADMIN — Medication 0.2 MG: at 09:42

## 2020-01-01 RX ADMIN — AMLODIPINE BESYLATE 5 MG: 5 TABLET ORAL at 08:41

## 2020-01-01 RX ADMIN — SODIUM CHLORIDE 8 MG/HR: 9 INJECTION, SOLUTION INTRAVENOUS at 11:04

## 2020-01-01 RX ADMIN — ONDANSETRON 4 MG: 2 INJECTION INTRAMUSCULAR; INTRAVENOUS at 17:50

## 2020-01-01 RX ADMIN — SODIUM CHLORIDE 12.5 MG/HR: 9 INJECTION, SOLUTION INTRAVENOUS at 00:53

## 2020-01-01 RX ADMIN — POTASSIUM CHLORIDE 10 MEQ: 7.46 INJECTION, SOLUTION INTRAVENOUS at 17:56

## 2020-01-01 RX ADMIN — Medication 10 ML: at 10:27

## 2020-01-01 RX ADMIN — HYDRALAZINE HYDROCHLORIDE 25 MG: 25 TABLET, FILM COATED ORAL at 15:40

## 2020-01-01 RX ADMIN — ATORVASTATIN CALCIUM 80 MG: 80 TABLET, FILM COATED ORAL at 21:09

## 2020-01-01 RX ADMIN — ATORVASTATIN CALCIUM 80 MG: 80 TABLET, FILM COATED ORAL at 20:45

## 2020-01-01 RX ADMIN — SODIUM CHLORIDE: 4.5 INJECTION, SOLUTION INTRAVENOUS at 09:21

## 2020-01-01 RX ADMIN — LABETALOL HYDROCHLORIDE 100 MG: 200 TABLET, FILM COATED ORAL at 20:46

## 2020-01-01 RX ADMIN — SODIUM CHLORIDE: 9 INJECTION, SOLUTION INTRAVENOUS at 09:38

## 2020-01-01 RX ADMIN — HYDROCODONE BITARTRATE AND ACETAMINOPHEN 2 TABLET: 5; 325 TABLET ORAL at 08:26

## 2020-01-01 RX ADMIN — LABETALOL HYDROCHLORIDE 10 MG: 5 INJECTION INTRAVENOUS at 17:53

## 2020-01-01 RX ADMIN — LABETALOL HYDROCHLORIDE 10 MG: 5 INJECTION INTRAVENOUS at 08:26

## 2020-01-01 RX ADMIN — ONDANSETRON 4 MG: 2 INJECTION INTRAMUSCULAR; INTRAVENOUS at 16:44

## 2020-01-01 RX ADMIN — SODIUM CHLORIDE: 4.5 INJECTION, SOLUTION INTRAVENOUS at 23:50

## 2020-01-01 RX ADMIN — HYDRALAZINE HYDROCHLORIDE 50 MG: 25 TABLET, FILM COATED ORAL at 14:37

## 2020-01-01 RX ADMIN — PROPOFOL 30 MCG/KG/MIN: 10 INJECTION, EMULSION INTRAVENOUS at 22:16

## 2020-01-01 RX ADMIN — ATORVASTATIN CALCIUM 80 MG: 80 TABLET, FILM COATED ORAL at 21:06

## 2020-01-01 RX ADMIN — LABETALOL HYDROCHLORIDE 10 MG: 5 INJECTION INTRAVENOUS at 21:28

## 2020-01-01 RX ADMIN — LABETALOL HYDROCHLORIDE 100 MG: 200 TABLET, FILM COATED ORAL at 10:26

## 2020-01-01 RX ADMIN — HYDRALAZINE HYDROCHLORIDE 75 MG: 25 TABLET, FILM COATED ORAL at 21:13

## 2020-01-01 RX ADMIN — SODIUM CHLORIDE: 9 INJECTION, SOLUTION INTRAVENOUS at 07:20

## 2020-01-01 RX ADMIN — ENOXAPARIN SODIUM 40 MG: 40 INJECTION SUBCUTANEOUS at 09:25

## 2020-01-01 RX ADMIN — LABETALOL HYDROCHLORIDE 200 MG: 200 TABLET, FILM COATED ORAL at 21:03

## 2020-01-01 RX ADMIN — LABETALOL HYDROCHLORIDE 200 MG: 200 TABLET, FILM COATED ORAL at 09:52

## 2020-01-01 RX ADMIN — ENOXAPARIN SODIUM 40 MG: 40 INJECTION SUBCUTANEOUS at 08:31

## 2020-01-01 RX ADMIN — MORPHINE SULFATE 2 MG: 2 INJECTION, SOLUTION INTRAMUSCULAR; INTRAVENOUS at 15:25

## 2020-01-01 RX ADMIN — SODIUM CHLORIDE 10 MG/HR: 9 INJECTION, SOLUTION INTRAVENOUS at 19:42

## 2020-01-01 RX ADMIN — SODIUM CHLORIDE 80 MG: 9 INJECTION, SOLUTION INTRAVENOUS at 19:00

## 2020-01-01 RX ADMIN — ENOXAPARIN SODIUM 40 MG: 40 INJECTION SUBCUTANEOUS at 10:26

## 2020-01-01 RX ADMIN — SODIUM CHLORIDE: 4.5 INJECTION, SOLUTION INTRAVENOUS at 18:27

## 2020-01-01 RX ADMIN — LABETALOL HYDROCHLORIDE 10 MG: 5 INJECTION INTRAVENOUS at 20:06

## 2020-01-01 RX ADMIN — PANTOPRAZOLE SODIUM 40 MG: 40 INJECTION, POWDER, FOR SOLUTION INTRAVENOUS at 09:04

## 2020-01-01 RX ADMIN — ONDANSETRON 4 MG: 2 INJECTION INTRAMUSCULAR; INTRAVENOUS at 21:28

## 2020-01-01 RX ADMIN — HYDROCODONE BITARTRATE AND ACETAMINOPHEN 2 TABLET: 5; 325 TABLET ORAL at 21:05

## 2020-01-01 RX ADMIN — HYDROMORPHONE HYDROCHLORIDE 0.5 MG: 1 INJECTION, SOLUTION INTRAMUSCULAR; INTRAVENOUS; SUBCUTANEOUS at 20:04

## 2020-01-01 RX ADMIN — ENOXAPARIN SODIUM 40 MG: 40 INJECTION SUBCUTANEOUS at 08:11

## 2020-01-01 RX ADMIN — EPINEPHRINE 1 MG: 0.1 INJECTION, SOLUTION ENDOTRACHEAL; INTRACARDIAC; INTRAVENOUS at 00:02

## 2020-01-01 RX ADMIN — LABETALOL HYDROCHLORIDE 200 MG: 200 TABLET, FILM COATED ORAL at 09:04

## 2020-01-01 RX ADMIN — HYDRALAZINE HYDROCHLORIDE 10 MG: 20 INJECTION INTRAMUSCULAR; INTRAVENOUS at 13:29

## 2020-01-01 RX ADMIN — PROMETHAZINE HYDROCHLORIDE 6.25 MG: 25 INJECTION INTRAMUSCULAR; INTRAVENOUS at 08:54

## 2020-01-01 RX ADMIN — ASPIRIN 325 MG ORAL TABLET 325 MG: 325 PILL ORAL at 07:46

## 2020-01-01 RX ADMIN — HYDROCODONE BITARTRATE AND ACETAMINOPHEN 2 TABLET: 5; 325 TABLET ORAL at 09:24

## 2020-01-01 RX ADMIN — LABETALOL HYDROCHLORIDE 10 MG: 5 INJECTION INTRAVENOUS at 03:42

## 2020-01-01 RX ADMIN — Medication 10 ML: at 21:03

## 2020-01-01 RX ADMIN — ONDANSETRON 4 MG: 2 INJECTION INTRAMUSCULAR; INTRAVENOUS at 09:58

## 2020-01-01 RX ADMIN — VALSARTAN 160 MG: 160 TABLET, FILM COATED ORAL at 09:25

## 2020-01-01 RX ADMIN — AMLODIPINE BESYLATE 10 MG: 5 TABLET ORAL at 01:16

## 2020-01-01 RX ADMIN — Medication 10 ML: at 08:11

## 2020-01-01 RX ADMIN — SODIUM CHLORIDE: 9 INJECTION, SOLUTION INTRAVENOUS at 17:14

## 2020-01-01 RX ADMIN — AMLODIPINE BESYLATE 10 MG: 5 TABLET ORAL at 08:26

## 2020-01-01 RX ADMIN — LABETALOL HYDROCHLORIDE 200 MG: 200 TABLET, FILM COATED ORAL at 15:56

## 2020-01-01 RX ADMIN — SODIUM CHLORIDE: 9 INJECTION, SOLUTION INTRAVENOUS at 18:27

## 2020-01-01 RX ADMIN — PROPOFOL 30 MCG/KG/MIN: 10 INJECTION, EMULSION INTRAVENOUS at 22:24

## 2020-01-01 RX ADMIN — SODIUM CHLORIDE 8 MG/HR: 9 INJECTION, SOLUTION INTRAVENOUS at 06:32

## 2020-01-01 RX ADMIN — CLEVIPIDINE 2 MG/HR: 0.5 EMULSION INTRAVENOUS at 11:19

## 2020-01-01 RX ADMIN — ENOXAPARIN SODIUM 40 MG: 40 INJECTION SUBCUTANEOUS at 09:52

## 2020-01-01 RX ADMIN — SODIUM CHLORIDE 8 MG/HR: 9 INJECTION, SOLUTION INTRAVENOUS at 09:35

## 2020-01-01 RX ADMIN — CLONIDINE HYDROCHLORIDE 0.1 MG: 0.1 TABLET ORAL at 17:58

## 2020-01-01 RX ADMIN — LABETALOL HYDROCHLORIDE 200 MG: 200 TABLET, FILM COATED ORAL at 21:13

## 2020-01-01 RX ADMIN — VALSARTAN 160 MG: 160 TABLET, FILM COATED ORAL at 12:42

## 2020-01-01 RX ADMIN — LABETALOL HYDROCHLORIDE 10 MG: 5 INJECTION INTRAVENOUS at 17:30

## 2020-01-01 RX ADMIN — ATORVASTATIN CALCIUM 80 MG: 80 TABLET, FILM COATED ORAL at 23:54

## 2020-01-01 RX ADMIN — ONDANSETRON 4 MG: 2 INJECTION INTRAMUSCULAR; INTRAVENOUS at 07:40

## 2020-01-01 RX ADMIN — SODIUM CHLORIDE 8 MG/HR: 9 INJECTION, SOLUTION INTRAVENOUS at 22:19

## 2020-01-01 RX ADMIN — PANTOPRAZOLE SODIUM 40 MG: 40 INJECTION, POWDER, FOR SOLUTION INTRAVENOUS at 10:20

## 2020-01-01 RX ADMIN — HYDRALAZINE HYDROCHLORIDE 10 MG: 20 INJECTION INTRAMUSCULAR; INTRAVENOUS at 06:44

## 2020-01-01 RX ADMIN — HYDRALAZINE HYDROCHLORIDE 50 MG: 25 TABLET, FILM COATED ORAL at 22:06

## 2020-01-01 RX ADMIN — AMLODIPINE BESYLATE 10 MG: 5 TABLET ORAL at 10:19

## 2020-01-01 RX ADMIN — Medication 10 ML: at 03:42

## 2020-01-01 RX ADMIN — AMLODIPINE BESYLATE 10 MG: 5 TABLET ORAL at 09:25

## 2020-01-01 RX ADMIN — HYDRALAZINE HYDROCHLORIDE 50 MG: 25 TABLET, FILM COATED ORAL at 06:33

## 2020-01-01 RX ADMIN — LABETALOL HYDROCHLORIDE 200 MG: 200 TABLET, FILM COATED ORAL at 20:27

## 2020-01-01 RX ADMIN — ENOXAPARIN SODIUM 40 MG: 40 INJECTION SUBCUTANEOUS at 09:04

## 2020-01-01 RX ADMIN — ATROPINE SULFATE 0.4 MG: 0.4 INJECTION, SOLUTION INTRAMUSCULAR; INTRAVENOUS; SUBCUTANEOUS at 23:15

## 2020-01-01 RX ADMIN — Medication 10 ML: at 09:07

## 2020-01-01 RX ADMIN — LABETALOL HYDROCHLORIDE 10 MG: 5 INJECTION INTRAVENOUS at 11:37

## 2020-01-01 RX ADMIN — CLOPIDOGREL BISULFATE 75 MG: 75 TABLET ORAL at 10:26

## 2020-01-01 RX ADMIN — MORPHINE SULFATE 2 MG: 2 INJECTION, SOLUTION INTRAMUSCULAR; INTRAVENOUS at 07:33

## 2020-01-01 RX ADMIN — SODIUM PHOSPHATE, MONOBASIC, MONOHYDRATE 15 MMOL: 276; 142 INJECTION, SOLUTION INTRAVENOUS at 16:31

## 2020-01-01 RX ADMIN — IOPAMIDOL 75 ML: 755 INJECTION, SOLUTION INTRAVENOUS at 07:00

## 2020-01-01 RX ADMIN — LABETALOL HYDROCHLORIDE 10 MG: 5 INJECTION INTRAVENOUS at 17:24

## 2020-01-01 RX ADMIN — ENOXAPARIN SODIUM 40 MG: 40 INJECTION SUBCUTANEOUS at 09:47

## 2020-01-01 RX ADMIN — LABETALOL HYDROCHLORIDE 10 MG: 5 INJECTION INTRAVENOUS at 11:04

## 2020-01-01 RX ADMIN — HYDRALAZINE HYDROCHLORIDE 75 MG: 25 TABLET, FILM COATED ORAL at 21:06

## 2020-01-01 RX ADMIN — LABETALOL HYDROCHLORIDE 10 MG: 5 INJECTION INTRAVENOUS at 13:06

## 2020-01-01 RX ADMIN — PROCHLORPERAZINE EDISYLATE 10 MG: 5 INJECTION INTRAMUSCULAR; INTRAVENOUS at 22:13

## 2020-01-01 RX ADMIN — CLONIDINE HYDROCHLORIDE 0.1 MG: 0.1 TABLET ORAL at 02:28

## 2020-01-01 RX ADMIN — LABETALOL HYDROCHLORIDE 10 MG: 5 INJECTION INTRAVENOUS at 07:43

## 2020-01-01 RX ADMIN — LABETALOL HYDROCHLORIDE 200 MG: 200 TABLET, FILM COATED ORAL at 14:32

## 2020-01-01 RX ADMIN — LABETALOL HYDROCHLORIDE 200 MG: 200 TABLET, FILM COATED ORAL at 09:25

## 2020-01-01 RX ADMIN — Medication 10 ML: at 20:28

## 2020-01-01 RX ADMIN — SODIUM CHLORIDE: 4.5 INJECTION, SOLUTION INTRAVENOUS at 10:08

## 2020-01-01 RX ADMIN — ATORVASTATIN CALCIUM 80 MG: 80 TABLET, FILM COATED ORAL at 21:13

## 2020-01-01 RX ADMIN — LABETALOL HYDROCHLORIDE 200 MG: 200 TABLET, FILM COATED ORAL at 21:09

## 2020-01-01 RX ADMIN — Medication 10 ML: at 20:10

## 2020-01-01 RX ADMIN — HYDRALAZINE HYDROCHLORIDE 10 MG: 20 INJECTION INTRAMUSCULAR; INTRAVENOUS at 19:00

## 2020-01-01 RX ADMIN — ATORVASTATIN CALCIUM 80 MG: 80 TABLET, FILM COATED ORAL at 20:41

## 2020-01-01 RX ADMIN — CLOPIDOGREL BISULFATE 75 MG: 75 TABLET ORAL at 09:04

## 2020-01-01 RX ADMIN — SODIUM CHLORIDE 10 MG/HR: 9 INJECTION, SOLUTION INTRAVENOUS at 03:18

## 2020-01-01 RX ADMIN — SODIUM CHLORIDE: 9 INJECTION, SOLUTION INTRAVENOUS at 15:41

## 2020-01-01 RX ADMIN — SODIUM CHLORIDE 8 MG/HR: 9 INJECTION, SOLUTION INTRAVENOUS at 19:37

## 2020-01-01 RX ADMIN — ATORVASTATIN CALCIUM 80 MG: 80 TABLET, FILM COATED ORAL at 20:56

## 2020-01-01 RX ADMIN — ENOXAPARIN SODIUM 40 MG: 40 INJECTION SUBCUTANEOUS at 16:48

## 2020-01-01 RX ADMIN — Medication 10 ML: at 09:26

## 2020-01-01 RX ADMIN — LABETALOL HYDROCHLORIDE 10 MG: 5 INJECTION INTRAVENOUS at 15:20

## 2020-01-01 RX ADMIN — HYDROCODONE BITARTRATE AND ACETAMINOPHEN 2 TABLET: 5; 325 TABLET ORAL at 22:06

## 2020-01-01 RX ADMIN — Medication 10 ML: at 20:46

## 2020-01-01 RX ADMIN — SODIUM CHLORIDE 5 MG/HR: 9 INJECTION, SOLUTION INTRAVENOUS at 12:10

## 2020-01-01 RX ADMIN — SODIUM CHLORIDE 8 MG/HR: 9 INJECTION, SOLUTION INTRAVENOUS at 12:54

## 2020-01-01 RX ADMIN — Medication 9 MCG/MIN: at 23:35

## 2020-01-01 RX ADMIN — SODIUM CHLORIDE 7.5 MG/HR: 9 INJECTION, SOLUTION INTRAVENOUS at 16:54

## 2020-01-01 RX ADMIN — HYDRALAZINE HYDROCHLORIDE 75 MG: 25 TABLET, FILM COATED ORAL at 14:02

## 2020-01-01 RX ADMIN — HYDRALAZINE HYDROCHLORIDE 5 MG: 20 INJECTION INTRAMUSCULAR; INTRAVENOUS at 07:22

## 2020-01-01 RX ADMIN — LABETALOL HYDROCHLORIDE 10 MG: 5 INJECTION INTRAVENOUS at 03:04

## 2020-01-01 RX ADMIN — LABETALOL HYDROCHLORIDE 200 MG: 200 TABLET, FILM COATED ORAL at 08:41

## 2020-01-01 RX ADMIN — HYDRALAZINE HYDROCHLORIDE 50 MG: 25 TABLET, FILM COATED ORAL at 06:48

## 2020-01-01 RX ADMIN — LABETALOL HYDROCHLORIDE 10 MG: 5 INJECTION INTRAVENOUS at 17:34

## 2020-01-01 RX ADMIN — LOSARTAN POTASSIUM 50 MG: 25 TABLET, FILM COATED ORAL at 01:16

## 2020-01-01 RX ADMIN — Medication 10 ML: at 08:41

## 2020-01-01 RX ADMIN — Medication 10 ML: at 09:04

## 2020-01-01 RX ADMIN — MORPHINE SULFATE 2 MG: 2 INJECTION, SOLUTION INTRAMUSCULAR; INTRAVENOUS at 13:10

## 2020-01-01 RX ADMIN — HYDRALAZINE HYDROCHLORIDE 75 MG: 25 TABLET, FILM COATED ORAL at 06:29

## 2020-01-01 RX ADMIN — PROPOFOL 40 MG: 10 INJECTION, EMULSION INTRAVENOUS at 09:46

## 2020-01-01 RX ADMIN — Medication 10 ML: at 09:48

## 2020-01-01 RX ADMIN — Medication 10 ML: at 08:33

## 2020-01-01 RX ADMIN — CLOPIDOGREL BISULFATE 75 MG: 75 TABLET ORAL at 09:25

## 2020-01-01 RX ADMIN — HYDRALAZINE HYDROCHLORIDE 50 MG: 25 TABLET, FILM COATED ORAL at 15:47

## 2020-01-01 RX ADMIN — CLOPIDOGREL BISULFATE 75 MG: 75 TABLET ORAL at 09:52

## 2020-01-01 RX ADMIN — EPINEPHRINE 1 MG: 0.1 INJECTION, SOLUTION ENDOTRACHEAL; INTRACARDIAC; INTRAVENOUS at 23:59

## 2020-01-01 RX ADMIN — HYDROCODONE BITARTRATE AND ACETAMINOPHEN 2 TABLET: 5; 325 TABLET ORAL at 09:52

## 2020-01-01 RX ADMIN — AMLODIPINE BESYLATE 10 MG: 5 TABLET ORAL at 09:52

## 2020-01-01 RX ADMIN — HYDROCODONE BITARTRATE AND ACETAMINOPHEN 2 TABLET: 5; 325 TABLET ORAL at 04:26

## 2020-01-01 RX ADMIN — LIDOCAINE HYDROCHLORIDE 100 MG: 20 INJECTION, SOLUTION INFILTRATION; PERINEURAL at 09:44

## 2020-01-01 RX ADMIN — LABETALOL HYDROCHLORIDE 200 MG: 200 TABLET, FILM COATED ORAL at 21:05

## 2020-01-01 RX ADMIN — ASPIRIN 81 MG: 81 TABLET, COATED ORAL at 10:26

## 2020-01-01 RX ADMIN — HYDROCODONE BITARTRATE AND ACETAMINOPHEN 2 TABLET: 5; 325 TABLET ORAL at 18:17

## 2020-01-01 RX ADMIN — PROPOFOL 140 MCG/KG/MIN: 10 INJECTION, EMULSION INTRAVENOUS at 09:45

## 2020-01-01 RX ADMIN — LABETALOL HYDROCHLORIDE 200 MG: 200 TABLET, FILM COATED ORAL at 10:19

## 2020-01-01 RX ADMIN — HYDROCODONE BITARTRATE AND ACETAMINOPHEN 2 TABLET: 5; 325 TABLET ORAL at 21:44

## 2020-01-01 RX ADMIN — POTASSIUM CHLORIDE 10 MEQ: 7.46 INJECTION, SOLUTION INTRAVENOUS at 16:47

## 2020-01-01 RX ADMIN — EPINEPHRINE 1 MG: 0.1 INJECTION, SOLUTION ENDOTRACHEAL; INTRACARDIAC; INTRAVENOUS at 00:04

## 2020-01-01 RX ADMIN — CLOPIDOGREL BISULFATE 75 MG: 75 TABLET ORAL at 10:19

## 2020-01-01 RX ADMIN — ONDANSETRON 4 MG: 2 INJECTION INTRAMUSCULAR; INTRAVENOUS at 03:04

## 2020-01-01 RX ADMIN — PROPOFOL 35 MCG/KG/MIN: 10 INJECTION, EMULSION INTRAVENOUS at 02:42

## 2020-01-01 RX ADMIN — SODIUM CHLORIDE 10 MG/HR: 9 INJECTION, SOLUTION INTRAVENOUS at 22:27

## 2020-01-01 RX ADMIN — HYDROCODONE BITARTRATE AND ACETAMINOPHEN 2 TABLET: 5; 325 TABLET ORAL at 15:52

## 2020-01-01 RX ADMIN — SODIUM CHLORIDE 1.5 MG/HR: 9 INJECTION, SOLUTION INTRAVENOUS at 11:14

## 2020-01-01 RX ADMIN — PROPOFOL 10 MCG/KG/MIN: 10 INJECTION, EMULSION INTRAVENOUS at 15:47

## 2020-01-01 RX ADMIN — ATORVASTATIN CALCIUM 80 MG: 80 TABLET, FILM COATED ORAL at 20:28

## 2020-01-01 RX ADMIN — HYDROCODONE BITARTRATE AND ACETAMINOPHEN 2 TABLET: 5; 325 TABLET ORAL at 21:13

## 2020-01-01 RX ADMIN — RACEPINEPHRINE HYDROCHLORIDE 11.25 MG: 11.25 SOLUTION RESPIRATORY (INHALATION) at 00:04

## 2020-01-01 RX ADMIN — PROCHLORPERAZINE EDISYLATE 10 MG: 5 INJECTION INTRAMUSCULAR; INTRAVENOUS at 01:03

## 2020-01-01 RX ADMIN — PROCHLORPERAZINE EDISYLATE 10 MG: 5 INJECTION INTRAMUSCULAR; INTRAVENOUS at 23:34

## 2020-01-01 RX ADMIN — HYDROCODONE BITARTRATE AND ACETAMINOPHEN 2 TABLET: 5; 325 TABLET ORAL at 03:25

## 2020-01-01 RX ADMIN — ALBUTEROL SULFATE 2.5 MG: 2.5 SOLUTION RESPIRATORY (INHALATION) at 23:08

## 2020-01-01 RX ADMIN — LABETALOL HYDROCHLORIDE 10 MG: 5 INJECTION INTRAVENOUS at 09:48

## 2020-01-01 RX ADMIN — SODIUM CHLORIDE 0.2 MCG/KG/HR: 9 INJECTION, SOLUTION INTRAVENOUS at 19:41

## 2020-01-01 RX ADMIN — Medication 10 ML: at 11:50

## 2020-01-01 RX ADMIN — MORPHINE SULFATE 2 MG: 2 INJECTION, SOLUTION INTRAMUSCULAR; INTRAVENOUS at 17:44

## 2020-01-01 RX ADMIN — ATORVASTATIN CALCIUM 80 MG: 80 TABLET, FILM COATED ORAL at 21:03

## 2020-01-01 RX ADMIN — SODIUM BICARBONATE 50 MEQ: 84 INJECTION, SOLUTION INTRAVENOUS at 00:04

## 2020-01-01 RX ADMIN — LABETALOL HYDROCHLORIDE 200 MG: 200 TABLET, FILM COATED ORAL at 20:56

## 2020-01-01 RX ADMIN — LORAZEPAM 1 MG: 2 INJECTION INTRAMUSCULAR; INTRAVENOUS at 16:18

## 2020-01-01 RX ADMIN — LABETALOL HYDROCHLORIDE 200 MG: 200 TABLET, FILM COATED ORAL at 08:26

## 2020-01-01 RX ADMIN — HYDRALAZINE HYDROCHLORIDE 50 MG: 25 TABLET, FILM COATED ORAL at 21:44

## 2020-01-01 RX ADMIN — ASPIRIN 81 MG: 81 TABLET, COATED ORAL at 10:19

## 2020-01-01 RX ADMIN — HYDRALAZINE HYDROCHLORIDE 25 MG: 25 TABLET, FILM COATED ORAL at 07:23

## 2020-01-01 ASSESSMENT — PAIN SCALES - GENERAL
PAINLEVEL_OUTOF10: 0
PAINLEVEL_OUTOF10: 10
PAINLEVEL_OUTOF10: 5
PAINLEVEL_OUTOF10: 0
PAINLEVEL_OUTOF10: 10
PAINLEVEL_OUTOF10: 2
PAINLEVEL_OUTOF10: 0
PAINLEVEL_OUTOF10: 6
PAINLEVEL_OUTOF10: 0
PAINLEVEL_OUTOF10: 2
PAINLEVEL_OUTOF10: 9
PAINLEVEL_OUTOF10: 0
PAINLEVEL_OUTOF10: 10
PAINLEVEL_OUTOF10: 7
PAINLEVEL_OUTOF10: 0
PAINLEVEL_OUTOF10: 5
PAINLEVEL_OUTOF10: 0
PAINLEVEL_OUTOF10: 10
PAINLEVEL_OUTOF10: 0
PAINLEVEL_OUTOF10: 5
PAINLEVEL_OUTOF10: 0
PAINLEVEL_OUTOF10: 3
PAINLEVEL_OUTOF10: 0
PAINLEVEL_OUTOF10: 5
PAINLEVEL_OUTOF10: 0
PAINLEVEL_OUTOF10: 7
PAINLEVEL_OUTOF10: 7
PAINLEVEL_OUTOF10: 2
PAINLEVEL_OUTOF10: 10
PAINLEVEL_OUTOF10: 0
PAINLEVEL_OUTOF10: 0
PAINLEVEL_OUTOF10: 8
PAINLEVEL_OUTOF10: 0
PAINLEVEL_OUTOF10: 6
PAINLEVEL_OUTOF10: 3
PAINLEVEL_OUTOF10: 0
PAINLEVEL_OUTOF10: 7
PAINLEVEL_OUTOF10: 0
PAINLEVEL_OUTOF10: 2
PAINLEVEL_OUTOF10: 6
PAINLEVEL_OUTOF10: 0
PAINLEVEL_OUTOF10: 10
PAINLEVEL_OUTOF10: 2
PAINLEVEL_OUTOF10: 0
PAINLEVEL_OUTOF10: 5
PAINLEVEL_OUTOF10: 0
PAINLEVEL_OUTOF10: 8
PAINLEVEL_OUTOF10: 0
PAINLEVEL_OUTOF10: 7
PAINLEVEL_OUTOF10: 0
PAINLEVEL_OUTOF10: 8
PAINLEVEL_OUTOF10: 0
PAINLEVEL_OUTOF10: 3
PAINLEVEL_OUTOF10: 7
PAINLEVEL_OUTOF10: 0
PAINLEVEL_OUTOF10: 0
PAINLEVEL_OUTOF10: 5
PAINLEVEL_OUTOF10: 0
PAINLEVEL_OUTOF10: 5

## 2020-01-01 ASSESSMENT — PAIN SCALES - PAIN ASSESSMENT IN ADVANCED DEMENTIA (PAINAD)
CONSOLABILITY: 0
FACIALEXPRESSION: 0
BODYLANGUAGE: 0
CONSOLABILITY: 1
FACIALEXPRESSION: 0
NEGVOCALIZATION: 0
FACIALEXPRESSION: 0
FACIALEXPRESSION: 1
BREATHING: 2
BODYLANGUAGE: 0
BREATHING: 0
BODYLANGUAGE: 0
BREATHING: 0
TOTALSCORE: 2
BREATHING: 0
TOTALSCORE: 0
FACIALEXPRESSION: 0
BODYLANGUAGE: 0
BREATHING: 0
BODYLANGUAGE: 0
BODYLANGUAGE: 0
NEGVOCALIZATION: 0
BODYLANGUAGE: 0
NEGVOCALIZATION: 0
CONSOLABILITY: 0
BREATHING: 0
FACIALEXPRESSION: 0
BREATHING: 0
CONSOLABILITY: 0
CONSOLABILITY: 1
CONSOLABILITY: 0
TOTALSCORE: 2
FACIALEXPRESSION: 0
BREATHING: 0
BREATHING: 0
BODYLANGUAGE: 0
BODYLANGUAGE: 0
BREATHING: 0
TOTALSCORE: 2
FACIALEXPRESSION: 0
BREATHING: 0
CONSOLABILITY: 1
TOTALSCORE: 0
NEGVOCALIZATION: 0
CONSOLABILITY: 1
CONSOLABILITY: 1
CONSOLABILITY: 0
CONSOLABILITY: 1
CONSOLABILITY: 0
BODYLANGUAGE: 0
TOTALSCORE: 2
BREATHING: 0
NEGVOCALIZATION: 1
TOTALSCORE: 0
TOTALSCORE: 2
BODYLANGUAGE: 0
NEGVOCALIZATION: 0
NEGVOCALIZATION: 0
BODYLANGUAGE: 0
TOTALSCORE: 0
BREATHING: 0
CONSOLABILITY: 0
TOTALSCORE: 2
CONSOLABILITY: 1
TOTALSCORE: 2
BREATHING: 0
BREATHING: 0
NEGVOCALIZATION: 0
NEGVOCALIZATION: 0
FACIALEXPRESSION: 0
NEGVOCALIZATION: 1
NEGVOCALIZATION: 0
FACIALEXPRESSION: 1
BODYLANGUAGE: 0
BREATHING: 2
BREATHING: 0
BODYLANGUAGE: 0
BREATHING: 0
TOTALSCORE: 2
NEGVOCALIZATION: 2
BODYLANGUAGE: 0
NEGVOCALIZATION: 0
CONSOLABILITY: 0
BODYLANGUAGE: 0
FACIALEXPRESSION: 1
TOTALSCORE: 2
TOTALSCORE: 2
BODYLANGUAGE: 0
TOTALSCORE: 0
FACIALEXPRESSION: 0
FACIALEXPRESSION: 1
FACIALEXPRESSION: 1
TOTALSCORE: 0
CONSOLABILITY: 0
FACIALEXPRESSION: 1
CONSOLABILITY: 0
NEGVOCALIZATION: 0
NEGVOCALIZATION: 0
BREATHING: 0
NEGVOCALIZATION: 0
NEGVOCALIZATION: 0
CONSOLABILITY: 1
FACIALEXPRESSION: 1
TOTALSCORE: 0
BODYLANGUAGE: 0
BODYLANGUAGE: 1
CONSOLABILITY: 0
BODYLANGUAGE: 0
TOTALSCORE: 2
BREATHING: 2
BREATHING: 0
BODYLANGUAGE: 0
TOTALSCORE: 2
BODYLANGUAGE: 0
FACIALEXPRESSION: 1
CONSOLABILITY: 0
NEGVOCALIZATION: 0
BREATHING: 0
BODYLANGUAGE: 0
CONSOLABILITY: 1
FACIALEXPRESSION: 1
CONSOLABILITY: 0
NEGVOCALIZATION: 0
BODYLANGUAGE: 0
BODYLANGUAGE: 0
FACIALEXPRESSION: 0
BODYLANGUAGE: 0
CONSOLABILITY: 0
FACIALEXPRESSION: 1
NEGVOCALIZATION: 0
FACIALEXPRESSION: 0
CONSOLABILITY: 0
CONSOLABILITY: 0
NEGVOCALIZATION: 0
BREATHING: 2
BODYLANGUAGE: 0
NEGVOCALIZATION: 0
TOTALSCORE: 2
BREATHING: 0
FACIALEXPRESSION: 0
NEGVOCALIZATION: 0
CONSOLABILITY: 0
CONSOLABILITY: 0
NEGVOCALIZATION: 0
FACIALEXPRESSION: 0
BODYLANGUAGE: 0
FACIALEXPRESSION: 0
TOTALSCORE: 2
FACIALEXPRESSION: 0
CONSOLABILITY: 0
NEGVOCALIZATION: 0
NEGVOCALIZATION: 0
TOTALSCORE: 0
TOTALSCORE: 7
BODYLANGUAGE: 0
TOTALSCORE: 2
TOTALSCORE: 2
BODYLANGUAGE: 0
BREATHING: 0
NEGVOCALIZATION: 0
FACIALEXPRESSION: 2
CONSOLABILITY: 1
CONSOLABILITY: 1
CONSOLABILITY: 0
NEGVOCALIZATION: 1
NEGVOCALIZATION: 0
FACIALEXPRESSION: 1
BREATHING: 0
TOTALSCORE: 4
FACIALEXPRESSION: 1
TOTALSCORE: 0
TOTALSCORE: 0
BREATHING: 0
FACIALEXPRESSION: 1
FACIALEXPRESSION: 0
CONSOLABILITY: 1
BREATHING: 0
BODYLANGUAGE: 1
TOTALSCORE: 0
NEGVOCALIZATION: 0
BREATHING: 2
BREATHING: 0
CONSOLABILITY: 0
CONSOLABILITY: 1
NEGVOCALIZATION: 0
CONSOLABILITY: 1
NEGVOCALIZATION: 0
BREATHING: 0
FACIALEXPRESSION: 0
NEGVOCALIZATION: 0
BREATHING: 0
FACIALEXPRESSION: 0
TOTALSCORE: 2
TOTALSCORE: 0
BREATHING: 0
TOTALSCORE: 0
BODYLANGUAGE: 0
NEGVOCALIZATION: 0
FACIALEXPRESSION: 0
FACIALEXPRESSION: 1
NEGVOCALIZATION: 0
NEGVOCALIZATION: 0
FACIALEXPRESSION: 1
CONSOLABILITY: 1
FACIALEXPRESSION: 0
BODYLANGUAGE: 0
TOTALSCORE: 2
BODYLANGUAGE: 0
BODYLANGUAGE: 0
BREATHING: 0
BODYLANGUAGE: 1
BREATHING: 0
TOTALSCORE: 0
TOTALSCORE: 0
FACIALEXPRESSION: 1
TOTALSCORE: 2
BREATHING: 1
FACIALEXPRESSION: 1
CONSOLABILITY: 1
FACIALEXPRESSION: 0
BODYLANGUAGE: 0
FACIALEXPRESSION: 1
FACIALEXPRESSION: 0
FACIALEXPRESSION: 0
BODYLANGUAGE: 0
CONSOLABILITY: 0
BODYLANGUAGE: 0
FACIALEXPRESSION: 0
FACIALEXPRESSION: 0
CONSOLABILITY: 1
TOTALSCORE: 3
CONSOLABILITY: 0
BODYLANGUAGE: 0
NEGVOCALIZATION: 0
CONSOLABILITY: 0
NEGVOCALIZATION: 0
CONSOLABILITY: 0
CONSOLABILITY: 0
BREATHING: 0
BREATHING: 0
BODYLANGUAGE: 0
BODYLANGUAGE: 0
FACIALEXPRESSION: 0
TOTALSCORE: 0
BREATHING: 0
NEGVOCALIZATION: 0
BREATHING: 0
BREATHING: 0
NEGVOCALIZATION: 1
BODYLANGUAGE: 0
BODYLANGUAGE: 0
TOTALSCORE: 2
BREATHING: 0
TOTALSCORE: 0
BREATHING: 0
NEGVOCALIZATION: 0
BODYLANGUAGE: 1
FACIALEXPRESSION: 1
CONSOLABILITY: 1
BREATHING: 0
CONSOLABILITY: 1
NEGVOCALIZATION: 0
NEGVOCALIZATION: 0
CONSOLABILITY: 1
TOTALSCORE: 2
FACIALEXPRESSION: 0
FACIALEXPRESSION: 2
BREATHING: 0
BODYLANGUAGE: 0
FACIALEXPRESSION: 1
BODYLANGUAGE: 0
BREATHING: 0
BODYLANGUAGE: 1
NEGVOCALIZATION: 0
CONSOLABILITY: 0
TOTALSCORE: 2
TOTALSCORE: 0
TOTALSCORE: 4
TOTALSCORE: 0
BODYLANGUAGE: 1
CONSOLABILITY: 1
CONSOLABILITY: 0
NEGVOCALIZATION: 0
TOTALSCORE: 0
NEGVOCALIZATION: 0
CONSOLABILITY: 1
FACIALEXPRESSION: 0
BODYLANGUAGE: 0
CONSOLABILITY: 0
BREATHING: 1
BODYLANGUAGE: 0
BREATHING: 0
TOTALSCORE: 0
TOTALSCORE: 2
BODYLANGUAGE: 1
TOTALSCORE: 2
BODYLANGUAGE: 0
BODYLANGUAGE: 0
FACIALEXPRESSION: 0
BREATHING: 0
TOTALSCORE: 2
NEGVOCALIZATION: 0
FACIALEXPRESSION: 0
FACIALEXPRESSION: 0
BODYLANGUAGE: 0
NEGVOCALIZATION: 0
BREATHING: 0
CONSOLABILITY: 0
TOTALSCORE: 2
NEGVOCALIZATION: 0
NEGVOCALIZATION: 0
FACIALEXPRESSION: 1
TOTALSCORE: 0
CONSOLABILITY: 0
NEGVOCALIZATION: 0
CONSOLABILITY: 1
BREATHING: 2
TOTALSCORE: 0
NEGVOCALIZATION: 0
BODYLANGUAGE: 1
CONSOLABILITY: 0
TOTALSCORE: 3
NEGVOCALIZATION: 1
BREATHING: 2
NEGVOCALIZATION: 0
FACIALEXPRESSION: 0
BREATHING: 0
BREATHING: 0
CONSOLABILITY: 0
BREATHING: 0
BREATHING: 2
TOTALSCORE: 7
TOTALSCORE: 2
FACIALEXPRESSION: 1

## 2020-01-01 ASSESSMENT — PAIN DESCRIPTION - PAIN TYPE
TYPE: ACUTE PAIN
TYPE: ACUTE PAIN
TYPE: OTHER (COMMENT)
TYPE: ACUTE PAIN

## 2020-01-01 ASSESSMENT — PAIN SCALES - WONG BAKER
WONGBAKER_NUMERICALRESPONSE: 0
WONGBAKER_NUMERICALRESPONSE: 0
WONGBAKER_NUMERICALRESPONSE: 2
WONGBAKER_NUMERICALRESPONSE: 2
WONGBAKER_NUMERICALRESPONSE: 0
WONGBAKER_NUMERICALRESPONSE: 2
WONGBAKER_NUMERICALRESPONSE: 0
WONGBAKER_NUMERICALRESPONSE: 2
WONGBAKER_NUMERICALRESPONSE: 0
WONGBAKER_NUMERICALRESPONSE: 2
WONGBAKER_NUMERICALRESPONSE: 2
WONGBAKER_NUMERICALRESPONSE: 0
WONGBAKER_NUMERICALRESPONSE: 4
WONGBAKER_NUMERICALRESPONSE: 0
WONGBAKER_NUMERICALRESPONSE: 0
WONGBAKER_NUMERICALRESPONSE: 2
WONGBAKER_NUMERICALRESPONSE: 0

## 2020-01-01 ASSESSMENT — PULMONARY FUNCTION TESTS
PIF_VALUE: 12
PIF_VALUE: 18
PIF_VALUE: 17
PIF_VALUE: 17
PIF_VALUE: 16
PIF_VALUE: 23
PIF_VALUE: 19
PIF_VALUE: 16
PIF_VALUE: 22
PIF_VALUE: 18
PIF_VALUE: 17

## 2020-01-01 ASSESSMENT — PAIN DESCRIPTION - LOCATION
LOCATION: ABDOMEN
LOCATION: GENERALIZED
LOCATION: ABDOMEN

## 2020-10-13 PROBLEM — I63.9 ACUTE CVA (CEREBROVASCULAR ACCIDENT) (HCC): Status: ACTIVE | Noted: 2020-01-01

## 2020-10-13 NOTE — ED NOTES
Wife now at bedside. Dr Xena Perry at bedside. Pt attempting to communicate with wife, motioning for her to come to him and kissing on her hand. States some relief in pain. Pt appears more comfortable, no longer tense or grabbing at stomach. Pts wife states pt has been feeling bad since Sunday night, states became confused around the same time. States has not taken BP meds in months, states \"I don't know why. He just doesn't like them\".      Ariel Abarca RN  10/13/20 9662

## 2020-10-13 NOTE — ED NOTES
Pt seen on monitor by Michele Bland Monitor tech in [facility]. Pt name and box number confirmed. Pt alert and shows no signs of distress at time of transfer to [facility].  Pt taken upstairs via bed by Michele rodas. pts shirt, pants, underwear and belt placed in belongings bag     Debi Candelario RN  10/13/20 9181

## 2020-10-13 NOTE — ACP (ADVANCE CARE PLANNING)
Advanced Care Planning Note. Purpose of Encounter: Advanced care planning in light of acute and chronic deteriorating medical conditions. Parties In Attendance: Patient (Leroy Mancia),  wife (EB), Dr. Nitza Gray (myself)    Decisional Capacity: Yes    Subjective: Patient/family understand in this voluntary conversation that Leroy Mancia continues to deteriorate and discuss what inteventions and plans patient would want implemented in light of the following diagnoses:  CVA    Objective: Pt has been having chronic decline in functional status with increased dependence on others for ADLs  Increased frequency of Hospitalizations. Likelihood of further hospitalizations with likelihood of escalation of medical interventions with the expectation of returning to a diminishing baseline level of functionality. Discussion highlights: I discussed with patient the ramifications of their acute and chronic medical problems- and the likely outcomes expected, both in terms of statistics, and as part of my experience seeing patients with similar conditions. We did discuss the meanings of the different possible code statuses- and we inquired which of these seem to line up with the patients current and previously expressed values. Goals of Care Determination:  Patient wishes to remain Full code for now, but has interest in continuing this conversation, and discussing with family before making any changes to code status and goals of care parameters. Plan: Continue to educate patient on medical conditions and the choices available regarding possible outcomes with regard to goals of care and code status.          Time spent on Advanced care Plannin minutes    Nitza Gray MD  10/13/2020 12:38 PM

## 2020-10-13 NOTE — PROGRESS NOTES
Call received from Radiology with MRI results - positive for \"Large acute left PCA vascular territory infarct\". Message sent to hospitalist.  Caregiving nurse informed of results. Will continue to monitor.

## 2020-10-13 NOTE — ED PROVIDER NOTES
[address] [facility]      Pt Name: Nisreen Casper  MRN: [id number]  [place] 1945  Date of evaluation: 10/13/2020  Provider: Sangita Bolton MD    CHIEF COMPLAINT       Chief Complaint   Patient presents with    Altered Mental Status     pt brought to ed by [facility] after wife found him on floor, unsure how long pt confused unable to follow directions and answer questions correctly          HISTORY OF PRESENT ILLNESS   (Location/Symptom, Timing/Onset, Context/Setting, Quality, Duration, Modifying Factors, Severity)  Note limiting factors. Nisreen Casper is a 76 y.o. male who presents to the emergency department fall. This morning wife noted that he was on the ground and she called EMS because she was unable to get them up. She states that over the last 48 hours he has been feeling unwell. She did not notice any significant weakness or confusion but states that he has been slower than usual over the last 48 hours. She denies any acute change to his mental status this morning when she found him. Patient provides minimal history secondary to altered mentation. He is denying any headache. EMS reports right upper extremity drift when they picked him up about 6:15 AM.  Wife did endorse history of medication noncompliance. He does have a history of cardiac stents on aspirin and Plavix. Blood sugar was 120s. He was hypertensive to the 052I systolic per EMS. HPI     Nursing Notes were reviewed. REVIEW OF SYSTEMS    (2-9 systems for level 4, 10 or more for level 5)     Review of Systems   Unable to perform ROS: Mental status change       Except as noted above the remainder of the review of systems was reviewed and negative.        PAST MEDICAL HISTORY     Past Medical History:   Diagnosis Date    Acute MI ([address] 2000    CAD (coronary artery disease)     Hyperlipidemia     Hypertension     Type II or unspecified type diabetes mellitus without mention of complication, not stated as uncontrolled     diet controlled         SURGICAL HISTORY       Past Surgical History:   Procedure Laterality Date    BACK SURGERY  2018    steel bracket inbetween vertebrae    CORONARY ANGIOPLASTY WITH STENT PLACEMENT  2000, 2/24/2012    X4 previously 2000; X1 2/24/2012    ESOPHAGOSCOPY      HAND SURGERY  5/09    HAND SURGERY Left 2009         CURRENT MEDICATIONS       Previous Medications    AMLODIPINE (NORVASC) 10 MG TABLET    Take 1 tablet by mouth daily    ASPIRIN 81 MG TABLET    Take 1 tablet by mouth See Admin Instructions Monday Wed Fri    CLOPIDOGREL (PLAVIX) 75 MG TABLET    Take 1 tablet by mouth daily    COENZYME Q10 (COQ10) 200 MG CAPS    Take 1 tablet by mouth daily    CYANOCOBALAMIN (VITAMIN B 12 PO)    Take by mouth    HYDROCODONE-ACETAMINOPHEN (NORCO)  MG PER TABLET    Take 1 tablet by mouth every 6 hours as needed for Pain    LABETALOL (NORMODYNE) 100 MG TABLET    Take 1 tablet by mouth every 12 hours    LOSARTAN (COZAAR) 50 MG TABLET    Take 1 tablet by mouth daily    NITROGLYCERIN (NITROLINGUAL) 0.4 MG/SPRAY 0.4 MG SPRAY    Place 1 spray under the tongue every 5 minutes as needed for Chest pain    ROSUVASTATIN (CRESTOR) 10 MG TABLET    TAKE 1 TABLET BY MOUTH EVERY DAY    SILDENAFIL (VIAGRA) 100 MG TABLET    Take 1 tablet by mouth as needed for Erectile Dysfunction       ALLERGIES     Patient has no known allergies.     FAMILY HISTORY       Family History   Problem Relation Age of Onset    Heart Disease Father     High Blood Pressure Father     High Cholesterol Father     Heart Disease Maternal Grandfather     Heart Disease Paternal Grandfather           SOCIAL HISTORY       Social History     Socioeconomic History    Marital status:      Spouse name: Not on file    Number of children: Not on file    Years of education: Not on file    Highest education level: Not on file   Occupational History    Not on file   Social Needs  Financial resource strain: Not on file   Alon-Maddie insecurity     Worry: Not on file     Inability: Not on file   [place] needs     Medical: Not on file     Non-medical: Not on file   Tobacco Use    Smoking status: Former Smoker     Packs/day: 0.00     Years: 20.00     Pack years: 0.00     Last attempt to quit: 1982     Years since quittin.6    Smokeless tobacco: Never Used   Substance and Sexual Activity    Alcohol use: Yes     Alcohol/week: 6.0 standard drinks     Types: 6 Cans of beer per week     Comment: Occ    Drug use: No    Sexual activity: Not on file   Lifestyle    Physical activity     Days per week: Not on file     Minutes per session: Not on file    Stress: Not on file   Relationships    Social connections     Talks on phone: Not on file     Gets together: Not on file     Attends Restorationism service: Not on file     Active member of club or organization: Not on file     Attends meetings of clubs or organizations: Not on file     Relationship status: Not on file    Intimate partner violence     Fear of current or ex partner: Not on file     Emotionally abused: Not on file     Physically abused: Not on file     Forced sexual activity: Not on file   Other Topics Concern    Not on file   Social History Narrative    Not on file       SCREENINGS   NIH Stroke Scale  NIH Stroke Scale Assessed: Yes  Interval: (change in nurse assesssment)  Level of Consciousness (1a. ): Alert  LOC Questions (1b. ):  Answers one correctly  LOC Commands (1c. ): Performs both tasks correctly  Best Gaze (2. ): Normal  Visual (3. ): (!) Partial hemianopia  Facial Palsy (4. ): Normal symmetrical movement  Motor Arm, Left (5a. ): No drift  Motor Arm, Right (5b. ): Drift, but does not hit bed  Motor Leg, Left (6a. ): No drift  Motor Leg, Right (6b. ): Drift, but does not hit bed  Limb Ataxia (7. ): (!) Present in one limb  Sensory (8. ): (!) Mild to Moderate  Best Language (9. ): Severe aphasia  Dysarthria (10. ): Normal  Extinction and Inattention (11): (!) Visual, tactile, auditory, spatial, or personal inattention  Total: 9                    PHYSICAL EXAM    (up to 7 for level 4, 8 or more for level 5)     ED Triage Vitals   BP Temp Temp src Pulse Resp SpO2 Height Weight   -- -- -- -- -- -- -- --       Physical Exam  Constitutional:       Appearance: Normal appearance. HENT:      Head: Normocephalic and atraumatic. Eyes:      General:         Right eye: No discharge. Left eye: No discharge. Conjunctiva/sclera: Conjunctivae normal.   Cardiovascular:      Rate and Rhythm: Normal rate and regular rhythm. Heart sounds: Normal heart sounds. No murmur. Pulmonary:      Effort: Pulmonary effort is normal. No respiratory distress. Breath sounds: Normal breath sounds. Abdominal:      General: Abdomen is flat. Bowel sounds are normal.      Palpations: Abdomen is soft. Tenderness: There is no abdominal tenderness. Musculoskeletal:         General: No swelling or tenderness. Right lower leg: No edema. Left lower leg: No edema. Skin:     General: Skin is warm and dry. Capillary Refill: Capillary refill takes less than 2 seconds. Neurological:      Mental Status: He is alert. He is confused. GCS: GCS eye subscore is 4. GCS verbal subscore is 5. GCS motor subscore is 6. Cranial Nerves: No cranial nerve deficit, dysarthria or facial asymmetry. Sensory: Sensory deficit present. Motor: Weakness present. Comments: Positive drift in the right upper and right lower extremity, positive subjective numbness in the right upper and right lower extremity when compared to the left side         DIAGNOSTIC RESULTS     EKG: All EKG's are interpreted by the Emergency Department Physician who either signs or Co-signs this chart in the absence of a cardiologist.    The Ekg interpreted by me in the absence of a cardiologist shows.   normal sinus rhythm with a rate of 64  Axis is   Left axis deviation  QTc is  normal  Intervals and Durations are unremarkable. Lateral T wave inversions unchanged when compared to February 20, 2018        RADIOLOGY:   Non-plain film images such as CT, Ultrasound and MRI are read by the radiologist. Plain radiographic images are visualized and preliminarily interpreted by the emergency physician with the below findings:        Interpretation per the Radiologist below, if available at the time of this note:     [address] [facility] [address]   Preliminary Result   Partial occlusion in the mid to distal V4 segment of the right vertebral   artery. There is up to 40% stenosis in mid V4 segment of the left vertebral   artery. 75% stenosis at the origin of the a M2 branch of the right MCA. 70% stenosis at the origin of the right common carotid artery. 50% stenosis at the origin of the right internal carotid artery. 30% stenosis at the origin of the left internal carotid artery. 70% severe at the distal V1 segment of the right vertebral artery. CT HEAD WO CONTRAST   Final Result   Posteriorly in the periventricular region on the right there is a nodular   area of increased density measuring 7 mm. This appears somewhat tubular in   character. .  This could represent a small a cavernoma or venous angioma. However a small area of hemorrhage could also have this appearance. There is a bulbous appearance to the middle cerebral artery distally on the   right, either due to tortuous vessel or small aneurysm. Atrophy and small-vessel ischemic change      Results discussed with [facility] by Cha Fraga MD at 7:06 am on   10/13/2020         XR CHEST (2 VW)    (Results Pending)        Partial occlusion in the mid to distal V4 segment of the right vertebral    artery. There is up to 40% stenosis in mid V4 segment of the left vertebral    artery.         75% stenosis at the origin of the a M2 branch of the right MCA.      70% stenosis at the origin of the right common carotid artery.         50% stenosis at the origin of the right internal carotid artery.         30% stenosis at the origin of the left internal carotid artery.         70% severe at the distal V1 segment of the right vertebral artery. LABS:  Labs Reviewed   CBC WITH AUTO DIFFERENTIAL - Abnormal; Notable for the following components:       Result Value    Lymphocytes Absolute 0.8 (*)     All other components within normal limits    Narrative:     Performed at:  [facility]  [address], [place]   Phone [phone]   COMPREHENSIVE METABOLIC PANEL W/ REFLEX TO MG FOR LOW K - Abnormal; Notable for the following components:    Glucose 168 (*)     GFR Non- 59 (*)     Total Bilirubin 1.5 (*)     All other components within normal limits    Narrative:     Performed at:  [facility]  [address], [place]   Phone [phone]   POCT GLUCOSE - Abnormal; Notable for the following components:    POC Glucose 157 (*)     All other components within normal limits    Narrative:     Performed at:  [facility]  [address], [place]   Phone [phone]   POCT GLUCOSE - Normal   TROPONIN    Narrative:     Performed at:  [facility]  [address], [place]   Phone [phone]   PROTIME-INR    Narrative:     Performed at:  [facility]  [address], [place]   Phone [phone]       All other labs were within normal range or not returned as of this dictation.     EMERGENCY DEPARTMENT COURSE and DIFFERENTIAL DIAGNOSIS/MDM:   Vitals:    Vitals:    10/13/20 0805 10/13/20 0815 10/13/20 0830 10/13/20 0845   BP: (!) 170/146 (!) 178/93 (!) 192/93 (!) 209/97   Pulse: 84 85 81 85   Resp: 13 17 20 16   Temp: TempSrc:       SpO2: 95% 95%  97%   Weight:       Height:           Course and MDM:  Code stroke called 0630  I was then able to receive collateral information and wife that this seems to have been going on for about 2 days. Patient will not be a TPA candidate due to extensive time since last known well. Stroke team recommends acute lowering of blood pressure around 066 systolic with hydralazine which was given. 9825 notified by radiology that the Noncon CT head is showing a 7mm area of hyperdensity posterior to the R ventricle. This could be an cavernoma but they cannot rule out ICH. Neurosurgery was consulted Dr. 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 325 mg aspirin was given. Pt began to complain of chest pain and repeat EKG did not show any significant ST or T wave changes. He will require ACS r/o as well. CTA is showing diffuse stenosis as above. Dr. Marshall Guzman with neurology was also consulted here and will follow patient after admission. He recommends BP goal of 051E systolic. Wife updated at bedside and agreeable to plan. CRITICAL CARE TIME   Total Critical Care time was  65 minutes, excluding separately reportable procedures. There was a high probability of clinically significant/life threatening deterioration in the patient's condition which required my urgent intervention. PROCEDURES:  Unless otherwise noted below, none     Procedures        FINAL IMPRESSION      1. Disorientation    2. Secondary hypertension    3. Chest pain, unspecified type    4. Cerebral infarction due to bilateral stenosis of vertebral arteries (HCC)          DISPOSITION/PLAN   DISPOSITION        PATIENT REFERRED TO:  No follow-up provider specified. DISCHARGE MEDICATIONS:  New Prescriptions    No medications on file     Controlled Substances Monitoring:     No flowsheet data found.     (Please note that portions of this note were completed with a voice recognition program.  Efforts were made to edit the dictations but occasionally words are mis-transcribed.)    Estefanía Knight MD (electronically signed)  Attending Emergency Physician         Oibe Joya MD  10/13/20 5877

## 2020-10-13 NOTE — ED NOTES
Pt medicated per orders. Now stating nausea, and left hand pain. Pt appears uncomfortable, but appears unable to properly communicate his needs with writer. Pt crutching at stomach, denies emesis or abdo pain. Pt moaning and repositioning in bed. Pt repeatedly asking Rossy Gelacio is my pain going to go away. \"     Mickael Cowden, KAT  10/13/20 0801

## 2020-10-13 NOTE — CONSULTS
Palliative Care:    a 76 y.o. male who presents to the emergency department fall today 10/13/20. This morning wife noted that he was on the ground and she called EMS because she was unable to get them up. She states that over the last 48 hours he has been feeling unwell. She did not notice any significant weakness or confusion but states that he has been slower than usual over the last 48 hours, non compliant with medication regime. She denies any acute change to his mental status this morning when she found him. Patient provides minimal history secondary to altered mentation. He is denying any headache. Admitted for symptom management. CT Head 10/13/20:         FINDINGS:    BRAIN/VENTRICLES: Ventricles are midline in position and mildly enlarged in    size.  There is moderate periventricular hypodensity seen.  Patchy and    confluent hypodensity is seen scattered throughout the frontal and parietal    white matter.         Posteriorly in the periventricular region on the right there is a nodular    area of increased density measuring 7 mm.  This appears somewhat tubular in    character.         There is a bulbous appearance to the middle cerebral artery distally on the    right.  There is intracranial atherosclerosis. .         ORBITS: The visualized portion of the orbits demonstrate no acute abnormality.         SINUSES: Fluid is seen in the mastoid air cells bilaterally.  Mild mucosal    thickening is seen in the maxillary sinuses.  There is streak artifact from    dental amalgam.         SOFT TISSUES/SKULL:  No acute abnormality of the visualized skull or soft    tissues.              Impression    Posteriorly in the periventricular region on the right there is a nodular    area of increased density measuring 7 mm.  This appears somewhat tubular in    character. .  This could represent a small a cavernoma or venous angioma.     However a small area of hemorrhage could also have this appearance.      There is a bulbous appearance to the middle cerebral artery distally on the    right, either due to tortuous vessel or small aneurysm.         Atrophy and small-vessel ischemic change           CT Neck 10/13/20:   FINDINGS:         CTA NECK:         AORTIC ARCH/ARCH VESSELS: No dissection or arterial injury.  No significant    stenosis of the brachiocephalic or subclavian arteries.         CAROTID ARTERIES: There is 70% stenosis at the origin of the right common    carotid artery.  There is 50% stenosis at the origin of the right internal    carotid artery.  There is 30% stenosis at the origin of the left internal    carotid artery.         VERTEBRAL ARTERIES: There is stenosis along the right cervical vertebral    artery, most severe at the distal V1 segment with up to 70% stenosis.  No    acute abnormality or flow stenosis in the cervical portion of the left    vertebral artery.         SOFT TISSUES: There is moderate emphysema.  No cervical or superior    mediastinal lymphadenopathy.  The larynx and pharynx are unremarkable.  No    acute abnormality of the salivary glands.  There is 1 cm left thyroid nodule,    does not require follow-up due to small size.         BONES: No acute osseous abnormality.              CTA HEAD:         ANTERIOR CIRCULATION: There is 75% stenosis at the origin of an M2 branch of    the right MCA.  No significant stenosis of the intracranial internal carotid,    anterior cerebral, or left middle cerebral arteries. No aneurysm.         POSTERIOR CIRCULATION: There is partial occlusion in the mid to distal V4    segment of the right vertebral artery.  There is up to 40% stenosis in mid V4    segment of the left vertebral artery.  No significant stenosis of the    basilar, or posterior cerebral arteries.  No aneurysm.         OTHER: No dural venous sinus thrombosis on this non-dedicated study.         BRAIN: There is a 7 mm focus of hyperattenuation in the right parietal periventricular white matter, likely corresponding to a cavernoma, stable in    size since MRI brain November 14, 2017.  There is mild parenchymal volume    loss. Lauren Cheers is periventricular white matter low attenuation, likely related    to mild to moderate chronic microvascular disease.  There is mild    hydrocephalus, stable.  No mass effect or midline shift. No extra-axial fluid    collection. The gray-white differentiation is maintained.  There is mild    mucosal thickening in the paranasal sinuses.  There are moderate bilateral    mastoid effusions.              Impression    Partial occlusion in the mid to distal V4 segment of the right vertebral    artery. There is up to 40% stenosis in mid V4 segment of the left vertebral    artery.         75% stenosis at the origin of the a M2 branch of the right MCA.         70% stenosis at the origin of the right common carotid artery.         50% stenosis at the origin of the right internal carotid artery.         30% stenosis at the origin of the left internal carotid artery.         70% severe at the distal V1 segment of the right vertebral artery. CXR 10/13/20:      Impression: 1. Superior mediastinal widening, likely due to prominent vasculature, given appearance on CT angiography of the neck performed just prior to this. 2. Hazy opacity at the right lung base with volume lo. .. Past Medical History:   has a past medical history of Acute MI ([address] CAD (coronary artery disease), Hyperlipidemia, Hypertension, and Type II or unspecified type diabetes mellitus without mention of complication, not stated as uncontrolled. Past Surgical History:   has a past surgical history that includes Coronary angioplasty with stent (2000, 2/24/2012); Hand surgery (5/09); ESOPHAGOSCOPY; back surgery (2018); Hand surgery (Left, 2009); and Cardiac surgery. Advance Directives:   Full Code       Problem Severity: Pain/Other Symptoms:   Denies. Has NORCO PRN for pain. Bed Mobility/Toileting/Transfer:  High fall risk. Fall this AM at home setting. Performance Status:        Palliative Performance Scale:  100% []Full Normal activity & work No evidence of disease  90%   [] Full Normal activity & work Some evidence of disease  80%   [] Full Normal activity with Effort Some evidence of disease  70%   [] Reduced Unable Normal Job/Work Significant disease Full Normal or reduced  60%   [] Ambulation reduced; Significant disease; Can't do hobbies/housework; intake normal   or reduced; occasional assist; LOC full/confusion  50%   [] Mainly sit/lie; Extensive disease; Can't do any work; Considerable assist; intake normal  Or reduced; LOC full/confusion  40%   [x] Mainly in bed; Extensive disease; Mainly assist; intake normal or reduced; occasional assist; LOC full/confusion  30%   [] Bed Bound; Extensive disease; Total care; intake reduced; LOC full/confusion  20%   [] Bed Bound; Extensive disease; Total care; intake minimal; Drowsy/coma  10%   [] Bed Bound; Extensive disease; Total care; Mouth care only; Drowsy/coma    PPS 40%    Symptom Assessment: Appetite/Nausea/Bowels/Fatigue:     lbs. BMI 27.26        Social History:   reports that he quit smoking about 38 years ago. He smoked 0.00 packs per day for 20.00 years. He has never used smokeless tobacco. He reports current alcohol use of about 6.0 standard drinks of alcohol per week. He reports that he does not use drugs. Family History:  family history includes Heart Disease in his father, maternal grandfather, and paternal grandfather; High Blood Pressure in his father; High Cholesterol in his father. Psychological/Spiritual:  . Taoism L[employer]-day. Has three daughters. Family Discussion:   Patient able to have yes/no conversations. Frustrated that he can not withhold full conversations. In orientation he is unable to state his name, yet when asked if his name is Parrish Goddard, he responds yes.  Is unable to

## 2020-10-13 NOTE — CONSULTS
on file   Social Needs    Financial resource strain: Not on file    Food insecurity     Worry: Not on file     Inability: Not on file    Transportation needs     Medical: Not on file     Non-medical: Not on file   Tobacco Use    Smoking status: Former Smoker     Packs/day: 0.00     Years: 20.00     Pack years: 0.00     Last attempt to quit: 1982     Years since quittin.6    Smokeless tobacco: Never Used   Substance and Sexual Activity    Alcohol use: Yes     Alcohol/week: 6.0 standard drinks     Types: 6 Cans of beer per week     Comment:  Occ    Drug use: No    Sexual activity: Not on file   Lifestyle    Physical activity     Days per week: Not on file     Minutes per session: Not on file    Stress: Not on file   Relationships    Social connections     Talks on phone: Not on file     Gets together: Not on file     Attends Anabaptism service: Not on file     Active member of club or organization: Not on file     Attends meetings of clubs or organizations: Not on file     Relationship status: Not on file    Intimate partner violence     Fear of current or ex partner: Not on file     Emotionally abused: Not on file     Physically abused: Not on file     Forced sexual activity: Not on file   Other Topics Concern    Not on file   Social History Narrative    Not on file     Current Facility-Administered Medications   Medication Dose Route Frequency Provider Last Rate Last Dose    [START ON 10/14/2020] aspirin EC tablet 81 mg  81 mg Oral Q MWF Yoli Woodall MD        [START ON 10/14/2020] clopidogrel (PLAVIX) tablet 75 mg  75 mg Oral Daily Yoli Woodall MD        sodium chloride flush 0.9 % injection 10 mL  10 mL Intravenous 2 times per day Yoli Woodall MD   10 mL at 10/13/20 1150    sodium chloride flush 0.9 % injection 10 mL  10 mL Intravenous PRN Yoli Woodall MD        polyethylene glycol (GLYCOLAX) packet 17 g  17 g Oral Daily PRN Yoli Woodall MD        [facility]) tablet 12.5 mg  12.5 mg Oral Q6H PRN Jl Ventura MD        Or    ondansetron [facility]) injection 4 mg  4 mg Intravenous Q6H PRN Jl Ventura MD        enoxaparin (LOVENOX) injection 40 mg  40 mg Subcutaneous Daily Jl Ventura MD        perflutren lipid microspheres (DEFINITY) injection 1.65 mg  1.5 mL Intravenous ONCE PRN Jl Ventura MD        labetalol (NORMODYNE;TRANDATE) injection 10 mg  10 mg Intravenous Q6H PRN Jl Ventura MD   10 mg at 10/13/20 1137    atorvastatin (LIPITOR) tablet 80 mg  80 mg Oral Nightly Jl Ventura MD        HYDROcodone-acetaminophen (NORCO) 5-325 MG per tablet 2 tablet  2 tablet Oral Q6H PRN Jl Ventura MD         No Known Allergies    PHYSICAL EXAMINATION:  BP (!) 170/86   Pulse 91   Temp 98.4 °F (36.9 °C) (Oral)   Resp 16   Ht 6' (1.829 m)   Wt 201 lb (91.2 kg)   SpO2 95%   BMI 27.26 kg/m²   Appearance: Well appearing, well nourished and in no distress  Mental Status Exam: Patient is awake and alert. He has expressive aphasia. Hence he is unable to cooperate for mental status testing. Memory judgment could not be tested. Language exam shows dysarthria and expressive aphasia with some comprehension difficulties. Funduscopic Exam: Could not be done due to limited cooperation  Cranial Nerves:   II: Visual fields: Could not be tested  III: Pupils: Equal round reacting to light  III,IV,VI: Extraocular movements were intact  V: Facial sensation: Normal bilaterally  VII: Mild questionable right facial droop  VIII: Hearing: Normal for conversation  IX: Palate moves symmetrically  XI: Shoulder strength was slightly diminished on the right side  XII: Tongue is in the midline   Motor: patient has a right hemiparesis with a strength of 1-2 over 5. Left side is 4+/5. Tone was diminished on the right side.   Sensory: Withdraws to pinprick all over   Coordination: impaired on the right side and normal on the left side              Reflexes: 1 and symmetrical  Plantar response: Extensor on the right side and withdrawal on the left side  Gait: Unable to ambulate without assistance could not be tested  Romberg:   Vascular: No carotid bruit bilaterally        DATA:  LABS:  General Labs:    CBC:   Lab Results   Component Value Date    WBC 6.2 10/13/2020    RBC 5.15 10/13/2020    HGB 16.0 10/13/2020    HCT 45.8 10/13/2020    MCV 88.9 10/13/2020    MCH 31.0 10/13/2020    MCHC 34.8 10/13/2020    RDW 14.2 10/13/2020     10/13/2020    MPV 7.9 10/13/2020     BMP:    Lab Results   Component Value Date     10/13/2020    K 3.9 10/13/2020     10/13/2020    CO2 25 10/13/2020    BUN 13 10/13/2020    LABALBU 4.4 10/13/2020    CREATININE 1.2 10/13/2020    CALCIUM 9.6 10/13/2020    GFRAA >60 10/13/2020    GFRAA >60 02/25/2012    LABGLOM 59 10/13/2020    GLUCOSE 157 10/13/2020     RADIOLOGY REVIEW:  I have reviewed radiology image(s) and reports(s) of: CT head and CT angiogram    IMPRESSION :  Clinically patient probably has had a large left hemispheric stroke with aphasia and right hemiparesis. Poorly controlled hypertension  Noncompliant with medication  History of coronary artery disease and hyperlipidemia  CT head and CT angiogram images and results were reviewed  Patient Active Problem List   Diagnosis    Essential hypertension, benign    Coronary atherosclerosis of native coronary artery    Palpitations    Coronary artery disease due to lipid rich plaque    Abnormal transaminases    Diverticulitis    Unstable angina (HCC)    Midline thoracic back pain    Mixed hyperlipidemia    Bilateral carotid artery stenosis    Acute CVA (cerebrovascular accident) ([address]     RECOMMENDATIONS ;  Discussed with patient and his wife  Antiplatelet therapy with aspirin  Because of the acute stroke which should slowly controlled hypertension over the next few days.   I would recommend that we bring down the blood pressure to at least 254-104 systolic over the next day or 2 and then

## 2020-10-13 NOTE — PROGRESS NOTES
Pt seen in  ED, admission completed. Pt is alert to self only, Pt lives at home, and is being admitted for fall at home, pt is experiencing stroke like symptoms . Plan of care updated, all questions answered.

## 2020-10-13 NOTE — H&P
Hospital Medicine History & Physical      PCP: Rosa Yarbrough    Date of Admission: 10/13/2020    Date of Service: Pt seen/examined on 10/13/2020  6:23 AM and   Admitted to Inpatient with expected LOS greater than two midnights due to medical therapy. Chief Complaint:  CVA    History Of Present Illness:    76 y.o. male with PMHx significant for hypertension, coronary artery disease s/p stents hyperlipidemia, balance issues, mild cognitive impairment mated to the hospital after he had a fall and acting abnormally for the last 2 to 3 days  History obtained from patient and wife although patient does not seem to remember much and is not able to give that much of a detailed history  Wife called EMS this morning as patient was noted to be on the ground unclear how he ended up there.   She states over the last 48 hours he has not been feeling well  She noticed that he is weak generally but more on the right side and very confused  This morning patient was at the mental status that he has been for the last 2 days with no worsening  Patient denies any headache  Wife has not noticed any facial distortions however as noted the last 2 days he keeps asking the same question does not seem to remember much and has been acting very different since Sunday  She did he denied any chest pain or shortness of breath  No headache  EMS reported that there was a right upper extremity drift in the picked him up at around 6:15 AM  He has not been compliant with his medication    Past Medical History:          Diagnosis Date    Acute MI ([address] 2000    CAD (coronary artery disease)     Hyperlipidemia     Hypertension     Type II or unspecified type diabetes mellitus without mention of complication, not stated as uncontrolled     diet controlled       Past Surgical History:          Procedure Laterality Date    BACK SURGERY  2018    steel bracket inbetween vertebrae    CARDIAC SURGERY      stents    CORONARY ANGIOPLASTY WITH STENT PLACEMENT  2000, 2/24/2012    X4 previously 2000; X1 2/24/2012    ESOPHAGOSCOPY      HAND SURGERY  5/09    HAND SURGERY Left 2009       Medications Prior to Admission:      Prior to Admission medications    Medication Sig Start Date End Date Taking? Authorizing Provider   HYDROcodone-acetaminophen (NORCO)  MG per tablet Take 1 tablet by mouth every 6 hours as needed for Pain   Yes Historical Provider, MD   Cyanocobalamin (VITAMIN B 12 PO) Take by mouth    Historical Provider, MD   aspirin 81 MG tablet Take 1 tablet by mouth See Admin Instructions Monday Wed Fri 11/4/19   Aislinn Vega MD   losartan (COZAAR) 50 MG tablet Take 1 tablet by mouth daily 10/16/19   Aislinn Vega MD   labetalol (NORMODYNE) 100 MG tablet Take 1 tablet by mouth every 12 hours 10/16/19   Aislinn Vega MD   clopidogrel (PLAVIX) 75 MG tablet Take 1 tablet by mouth daily 7/22/19   Aislinn Vega MD   rosuvastatin (CRESTOR) 10 MG tablet TAKE 1 TABLET BY MOUTH EVERY DAY 2/27/19   Aislinn Vega MD   amLODIPine (NORVASC) 10 MG tablet Take 1 tablet by mouth daily  Patient taking differently: Take 10 mg by mouth daily Indications: currently only taking 5mg 11/4/19 7/5/18   Aislinn Vega MD   Coenzyme Q10 (COQ10) 200 MG CAPS Take 1 tablet by mouth daily 9/28/17   Aislinn Vega MD   nitroGLYCERIN (NITROLINGUAL) 0.4 MG/SPRAY 0.4 mg spray Place 1 spray under the tongue every 5 minutes as needed for Chest pain 4/5/17   Aislinn Vega MD   sildenafil (VIAGRA) 100 MG tablet Take 1 tablet by mouth as needed for Erectile Dysfunction 5/11/16   Aislinn Vega MD       Allergies:  Patient has no known allergies. Social History:      The patient currently lives at home     TOBACCO:   reports that he quit smoking about 38 years ago. He smoked 0.00 packs per day for 20.00 years. He has never used smokeless tobacco.  ETOH:   reports current alcohol use of about 6.0 standard drinks of alcohol per week.       Family History: Reviewed in detail and negative for DM, CAD, Cancer, CVA. Positive as follows:        Problem Relation Age of Onset    Heart Disease Father     High Blood Pressure Father     High Cholesterol Father     Heart Disease Maternal Grandfather     Heart Disease Paternal Grandfather        REVIEW OF SYSTEMS:   Pertinent positives as noted in the HPI. All other systems reviewed and negative. PHYSICAL EXAM:    BP (!) 180/114   Pulse 85   Temp 98.4 °F (36.9 °C) (Oral)   Resp 16   Ht 6' (1.829 m)   Wt 201 lb (91.2 kg)   SpO2 95%   BMI 27.26 kg/m²     General appearance:  No apparent distress, appears stated age and cooperative. HEENT:  Normal cephalic, atraumatic without obvious deformity. Pupils equal, round, and reactive to light. Extra ocular muscles intact. Conjunctivae/corneas clear. Neck: Supple, with full range of motion. No jugular venous distention. Trachea midline. Respiratory:  Normal respiratory effort. Clear to auscultation, bilaterally without RALES/WHEEZES/RHONCHI. Cardiovascular: First and second heart sounds normal, ejection systolic murmur radial pulses equal on both upper extremities  Abdomen: Soft, non-tender, non-distended with normal bowel sounds. Musculoskeletal:  No clubbing, cyanosis or EDEMA bilaterally. Full range of motion without deformity. Skin: Skin color, texture, turgor normal.  No rashes or lesions.   Neurologic: Cranial nerves intact, seems to have some right hemineglect  Right upper extremity power 3/5  Right lower extremity power 2+/5 left upper extremity and left lower extremity power 5/5  Right plantar upgoing  Left plantar downgoing  Reflexes seem to be decreased generalized  Psychiatric:  Alert and oriented to self person place but does not know the time, thought content appropriate, normal insight  Capillary Refill: Brisk,< 3 seconds   Peripheral Pulses: +2 palpable, equal bilaterally       Labs:     Recent Labs     10/13/20  0640   WBC 6.2   HGB 16.0   HCT 45.8        Recent Labs     10/13/20  0640      K 3.9      CO2 25   BUN 13   CREATININE 1.2   CALCIUM 9.6     Recent Labs     10/13/20  0640   AST 18   ALT 21   BILITOT 1.5*   ALKPHOS 104     Recent Labs     10/13/20  0640   INR 1.07     Recent Labs     10/13/20  0640 10/13/20  1030   TROPONINI <0.01 <0.01       Urinalysis:      Lab Results   Component Value Date    NITRU Negative 02/28/2015    WBCUA None seen 02/28/2015    BACTERIA Rare 02/28/2015    RBCUA 0-2 02/28/2015    BLOODU TRACE-LYSED 02/28/2015    SPECGRAV 1.020 02/28/2015    GLUCOSEU 250 02/28/2015       Radiology:     CXR: I have reviewed the CXR with the following interpretation: No abdominally detected, reported as mild superior mediastinal widening however that area was also reported to be seen on the CT of the neck angiogram and related to prominent vasculature  EKG:  I have reviewed the EKG with the following interpretation: Sinus rhythm, LVH reviewing previous EKGs it seems to be unchanged    XR CHEST (2 VW)   Preliminary Result   1. Superior mediastinal widening, likely due to prominent vasculature, given   appearance on CT angiography of the neck performed just prior to this. 2.  Hazy opacity at the right lung base with volume loss in the right   hemithorax, suggesting atelectasis. CTA HEAD NECK W CONTRAST   Preliminary Result   Partial occlusion in the mid to distal V4 segment of the right vertebral   artery. There is up to 40% stenosis in mid V4 segment of the left vertebral   artery. 75% stenosis at the origin of the a M2 branch of the right MCA. 70% stenosis at the origin of the right common carotid artery. 50% stenosis at the origin of the right internal carotid artery. 30% stenosis at the origin of the left internal carotid artery. 70% severe at the distal V1 segment of the right vertebral artery.          CT HEAD WO CONTRAST   Final Result   Posteriorly in the periventricular region on the right there is a nodular   area of increased density measuring 7 mm. This appears somewhat tubular in   character. .  This could represent a small a cavernoma or venous angioma. However a small area of hemorrhage could also have this appearance. There is a bulbous appearance to the middle cerebral artery distally on the   right, either due to tortuous vessel or small aneurysm. Atrophy and small-vessel ischemic change      Results discussed with [facility] by Alex Garduno.  Kassy Mireles MD at 7:06 am on   10/13/2020         MRI BRAIN WO CONTRAST    (Results Pending)       ASSESSMENT/PLAN:    Active Hospital Problems    Diagnosis Date Noted    Acute CVA (cerebrovascular accident) [facility]) [I63.9] 10/13/2020       Acute Medical Issues Being Addressed:    19-year-old gentleman admitted to the hospital with some altered mental status and right upper extremity weakness and fall    Fall suspect mechanical    Acute encephalopathy suspect secondary to acute left CVA with right-sided weakness some mild hemineglect  CT of the head CT angiogram of the head and neck showed multiple intracranial posterior circulation and carotid artery stenosis  Given the unclear duration of onset of the symptoms or at least over 48 hours not a candidate for TPA or any intervention  He is on aspirin Plavix unclear how compliant he is will continue with that  Start atorvastatin  Check A1c and lipids  Check echocardiogram  Telemetry  Swallow evaluation  Physical and Occupational Therapy consult  Neurology consult    Malignant hypertension with endorgan damage  Given his possible acute stroke at this point will keep blood pressure less than 207 systolic  I have put in as needed labetalol  If his blood pressure keeps trending up then we will need to transfer to ICU to put him on a nicardipine drip to keep his blood pressure between 812 and 097 systolic to keep blood pressure control at the same time allowing permissive hypertension    Coronary artery disease s/p previous stents  On aspirin and Plavix  Echocardiogram  Trend of troponins    Hyperlipidemia  High-dose statin    Check TSH and B12  All of the above discussed at length with the patient and his wife who is at the bedside they voiced understanding and are in agreement    L superior mediastinal widening on chest x-ray  His radial pulses are equal more importantly on the CT angiogram of the neck the chest x-ray report acknowledges that that area was included on the CT angiogram and was visualized and showed only prominent vasculature ends at this time will not pursue any further testing    DVT Prophylaxis: sc lovenox   Diet: DIET CARB CONTROL;  Code Status: Full Code    PT/OT Eval Status: will have eval if appropriate given patient's condition    Dispo - once acute medical process is resolved       German Welch MD    Thank you Yunier Velasquez for the opportunity to be involved in this patient's care. If you have any questions or concerns please feel free to contact me.

## 2020-10-13 NOTE — CARE COORDINATION
Discharge Planning Assessment    RN/SW discharge planner met with patient/ (and family member) to discuss reason for admission, current living situation, and potential needs at the time of discharge    Demographics/Insurance verified Yes/No    Current type of dwelling: one step to enter a ranchstyle plan. Living arrangements: spouse    Level of function/Support: Independent    PCP: Elisabeth Mcintyre    DME: cane and walker with wheels    Active with any community resources/agencies/skilled home care: no    Medication compliance issues: yes    Financial issues that could impact healthcare:no    Tentative discharge plan: Home with spouse pending PT/OT Rehab.     Transportation at the time of discharge: wife

## 2020-10-13 NOTE — ED NOTES
Dr Misti Nuñez at bedside. Pt assisted with urinal. Now stating abdo pain and CP.      Kurt Clarke RN  10/13/20 [id number]

## 2020-10-13 NOTE — ED NOTES
Patient arrived confused, unable to answer questions appropriately, stroke alert called, IV established and patient to CT, Dr. Xu Hernández at the bedside      [facility]  10/13/20 [id number]

## 2020-10-14 NOTE — PLAN OF CARE
Problem: Cardiovascular  Goal: Hemodynamic stability  Outcome: Ongoing     Problem: Skin Integrity:  Goal: Absence of new skin breakdown  Description: Absence of new skin breakdown  Outcome: Ongoing     Vitals:    10/14/20 0413   BP: (!) 176/86   Pulse: 62   Resp: 18   Temp: 98 °F (36.7 °C)   SpO2: 93%     Pt awake in bed with VS improved, /86 at 0413 s/p clonidine 0.1mg given at 0228 & Labetalol IV PRN at 0342; see MAR & all flowsheets. Pt incontinent urine and stool; pericare provided and pt repositioned. No new skin breakdown noted. Pt tolerated well. Will continue to monitor.

## 2020-10-14 NOTE — CONSULTS
Orthodoxy service: Not on file     Active member of club or organization: Not on file     Attends meetings of clubs or organizations: Not on file     Relationship status: Not on file    Intimate partner violence     Fear of current or ex partner: Not on file     Emotionally abused: Not on file     Physically abused: Not on file     Forced sexual activity: Not on file   Other Topics Concern    Not on file   Social History Narrative    Not on file     Family History   Problem Relation Age of Onset    Heart Disease Father     High Blood Pressure Father     High Cholesterol Father     Heart Disease Maternal Grandfather     Heart Disease Paternal Grandfather        ROS:  Unable to fully complete d/t aphasia. Denies C/P, SOB. PHYSICAL EXAMINATION:  BP (!) 174/83   Pulse 62   Temp 98.2 °F (36.8 °C) (Oral)   Resp 16   Ht 6' (1.829 m)   Wt 181 lb 9.6 oz (82.4 kg)   SpO2 93%   BMI 24.63 kg/m²   GENERAL:  alert  HEENT:  sclera clear, mucus membranes moist and neck supple  NEUROLOGIC:  Awake, alert. +expressive aphasia. RUE 1-2/5. RLE 2-3.   LUE/LLE 5/5    LABORATORY DATA:   CBC with Differential:    Lab Results   Component Value Date    WBC 8.3 10/14/2020    RBC 4.92 10/14/2020    HGB 15.1 10/14/2020    HCT 43.6 10/14/2020     10/14/2020    MCV 88.7 10/14/2020    MCH 30.8 10/14/2020    MCHC 34.7 10/14/2020    RDW 14.2 10/14/2020    LYMPHOPCT 10.8 10/14/2020    MONOPCT 7.1 10/14/2020    BASOPCT 0.4 10/14/2020    MONOSABS 0.6 10/14/2020    LYMPHSABS 0.9 10/14/2020    EOSABS 0.0 10/14/2020    BASOSABS 0.0 10/14/2020     CMP:    Lab Results   Component Value Date     10/14/2020    K 3.4 10/14/2020    K 3.9 10/13/2020     10/14/2020    CO2 24 10/14/2020    BUN 14 10/14/2020    CREATININE 1.2 10/14/2020    GFRAA >60 10/14/2020    GFRAA >60 02/25/2012    AGRATIO 1.5 10/14/2020    LABGLOM 59 10/14/2020    GLUCOSE 165 10/14/2020    PROT 6.1 10/14/2020    LABALBU 3.7 10/14/2020    CALCIUM 8.9 V4 segment of the left vertebral    artery.         Partial occlusion in the P2 segment of the left PCA.      75% stenosis at the origin of the a M2 branch of the right MCA.         70% stenosis at the origin of the right common carotid artery.         50% stenosis at the origin of the right internal carotid artery.         30% stenosis at the origin of the left internal carotid artery.         70% severe at the distal V1 segment of the right vertebral artery. IMPRESSION/PLAN:  Active Problems:    Acute CVA (cerebrovascular accident) ([address]   Tx per neurology. Working on better control of hypertension. For intracranial stenosis, we base the treatment on SAMMPRIS trial.  Treatment consist of dual antiplatelet therapy, statins, adequate management of glycemia, cholesterol and blood pressure. This is generally managed by neurology or internal medicine. Procedures, stents or bypasses are only indicated if patients fail medical management on two occasions. Pt will need to follow up to neuroendovascular surgeon, Dr. Sean Campos as outpt. [phone]. I will send internal referral.    The patient's symptoms, exam, testing and plan of care have been discussed with Dr. Maurilio Morrison and pt's wife who was at bedside. Thank you again for this consultation.     Aurea Renner  10/14/2020

## 2020-10-14 NOTE — PROGRESS NOTES
Speech Language/Pathology   SPEECH LANGUAGE AND CLINICAL BEDSIDE SWALLOWING EVALUATION   Speech Therapy Department     Patient Name:  Heather Lynn  :  1945  Pain level:  Medical Diagnosis:  Acute CVA (cerebrovascular accident) ([facility] [address] [I63.9]  Acute CVA (cerebrovascular accident) ([facility] [address] [facility] [I63.9]    HPI:  76 y.o. male with PMHx significant for hypertension, coronary artery disease s/p stents hyperlipidemia, balance issues, mild cognitive impairment mated to the hospital after he had a fall and acting abnormally for the last 2 to 3 days  History obtained from patient and wife although patient does not seem to remember much and is not able to give that much of a detailed history  Wife called EMS this morning as patient was noted to be on the ground unclear how he ended up there. She states over the last 48 hours he has not been feeling well  She noticed that he is weak generally but more on the right side and very confused  This morning patient was at the mental status that he has been for the last 2 days with no worsening  Patient denies any headache  Wife has not noticed any facial distortions however as noted the last 2 days he keeps asking the same question does not seem to remember much and has been acting very different since   She did he denied any chest pain or shortness of breath  No headache  EMS reported that there was a right upper extremity drift in the picked him up at around 6:15 AM  He has not been compliant with his medication    MRI Brain (10/13/20): Impression    Large acute left PCA vascular territory infarct. Chest Xray (10/13/20): Impression    1.  Superior mediastinal widening, likely due to prominent vasculature, given    appearance on CT angiography of the neck performed just prior to this.         2.  Hazy opacity at the right lung base with volume loss in the right    hemithorax, suggesting atelectasis. SLP eval and treat orders received and acknowledged.   Pt's daughter present in room at the beginning and end of evaluation only. Daughter assisted in providing [address] as pt was unable. Per daughter, pt is [facility] [place] and does not have hearing aids at hospital with him. No speech-language deficits prior to CVA; however, does report changes to memory and occasional coughing during meals. Pt consumed regular diet and has no prior hx of speech therapy. CLINICAL SWALLOW EVALUATION:  Dysphagia Treatment Diagnosis: Mild Oropharyngeal Dysphagia     Impressions:   Pt unable to complete oral [facility] exam in entirety due to receptive language deficits/difficulty following commands. Decreased lingual ROM and coordination with no apparent weakness assessed. Pt provided various texture trials to evaluate overall swallow function. Pt exhibits mild oropharyngeal dysphagia characterized by decreased bolus control, prolonged mastication, and delayed swallow initiation. Thin liquids via cup and straw tolerating with no overt clinical s/s of aspiration/penetration. Regular textures revealed prolonged and disorganized mastication with use of effortful swallows to clear. Improved mastication and oral clearance assessed soft solids. Regular textures and soft solids tolerated with no overt clinical s/s of aspiration/penetration. Recommend initiation Dysphagia III Soft & Bite-Sized diet with thin liquids. Pt will benefit from assistance with self feeding due to dominant right sided weakness. Dietary Recommendations:   1.) Dysphagia III Soft & Bite Sized with thin liquids  2.) Recommend 1:1 assistance with self feeding    Strategies: 90 degree positioning with all p.o. intake; small bites/sips; alternate textures through meal; reduce rate of intake; No straws     Dysphagia Treatment/Goals: Speech therapy for dysphagia tx 3-5 times per week.    ST.) Pt will tolerate recommended diet without s/s of aspiration   2.) If clinical symptoms of penetration/aspiration continue to be noted,Pt will

## 2020-10-14 NOTE — PROGRESS NOTES
Occupational Therapy   Occupational Therapy Initial Assessment  Date: 10/14/2020   Patient Name: Isabelle Fonseca  MRN: [id number]     : 1945    Date of Service: 10/14/2020    Discharge Recommendations: Isabelle Fonseca scored a 9/24 on the AM-PAC ADL Inpatient form. Current research shows that an AM-PAC score of 17 or less is typically not associated with a discharge to the patient's home setting. Based on the patient's AM-PAC score and their current ADL deficits, it is recommended that the patient have 5-7 sessions per week of Occupational Therapy at d/c to increase the patient's independence. At this time, this patient demonstrates the endurance, and/or tolerance for 3 hours of therapy each day, with a treatment frequency of 5-7x/wk. Please see assessment section for further patient specific details. If patient discharges prior to next session this note will serve as a discharge summary. Please see below for the latest assessment towards goals. OT Equipment Recommendations  Equipment Needed: No  Other: continue to assess    Assessment   Performance deficits / Impairments: Decreased functional mobility ; Decreased balance;Decreased ADL status; Decreased endurance;Decreased high-level IADLs;Decreased cognition;Decreased vision/visual deficit; Decreased coordination;Decreased strength;Decreased sensation;Decreased fine motor control  Assessment: Patient presents well below baseline d/t above deficits; OT indicated to maximize safety/independence with ADL and IADL  Treatment Diagnosis: Above deficits associated with CVA  Prognosis: Good  Decision Making: High Complexity  Exam: as above  OT Education: OT Role;Plan of Care  Patient Education: eval, discharge - wife verbalizes understanding, pt will require reinforcement  REQUIRES OT FOLLOW UP: Yes  Activity Tolerance  Activity Tolerance: Patient limited by fatigue;Treatment limited secondary to decreased cognition  Safety Devices  Safety Devices in place: Yes  Type of devices: All fall risk precautions in place;Nurse notified;Call light within reach; Left in bed;Bed alarm in place;Gait belt           Patient Diagnosis(es): The primary encounter diagnosis was Disorientation. Diagnoses of Secondary hypertension, Chest pain, unspecified type, and Cerebral infarction due to bilateral stenosis of vertebral arteries ([facility] [address] were also pertinent to this visit. has a past medical history of Acute MI ([facility] [address] CAD (coronary artery disease), Hyperlipidemia, Hypertension, and Type II or unspecified type diabetes mellitus without mention of complication, not stated as uncontrolled. has a past surgical history that includes Coronary angioplasty with stent (2000, 2/24/2012); Hand surgery (5/09); ESOPHAGOSCOPY; back surgery (2018); Hand surgery (Left, 2009); and Cardiac surgery. Treatment Diagnosis: Above deficits associated with CVA      Restrictions  Restrictions/Precautions  Restrictions/Precautions: Fall Risk, Modified Diet(high fall risk, dys soft and bite sized)  Required Braces or Orthoses?: No  Position Activity Restriction  Other position/activity restrictions: Rehana Vergara is a 76 y.o. male who presents to the emergency department fall. This morning wife noted that he was on the ground and she called EMS because she was unable to get them up. She states that over the last 48 hours he has been feeling unwell. She did not notice any significant weakness or confusion but states that he has been slower than usual over the last 48 hours.  MRI showed \"Large acute left PCA vascular territory infarct\"    Subjective   General  Chart Reviewed: Yes  Family / Caregiver Present: No  Diagnosis: CVA  Subjective  Subjective: Patient supine in bed, with head resting to the (L) upon arrival. Dtr and wife present - pt agreeable to evaluation - difficulty with understanding pt at time secondary to receptive/expressive aphasia, he is also [facility]  Patient Currently in Pain: Denies  Vital Signs  Patient Currently in Pain: Denies     Social/Functional History  Social/Functional History  Lives With: Spouse  Type of Home: House  Home Layout: One level  Home Access: Stairs to enter without rails  Entrance Stairs - Number of Steps: 1  Bathroom Shower/Tub: Tub/Shower unit  Bathroom Equipment: Grab bars in shower, Shower chair  Home Equipment: Cane, Rolling walker  ADL Assistance: [facility]: Independent(shares with wife)  Homemaking Responsibilities: Yes  Ambulation Assistance: Independent(with cane)  Transfer Assistance: Independent  Active : Yes  Leisure & Hobbies: goes boating, hanging out with friends  Additional Comments: 1 fall out of bed leading to this admission, patient unable to provide any information - spouse present to provide above information       Objective   Vision: Impaired  Vision Exceptions: Wears glasses for reading  Hearing: Exceptions to [facility]  Hearing Exceptions: Bilateral hearing aid    Orientation  Overall Orientation Status: (WILNER)  Observation/Palpation  Observation: inattention to RUE/RLE and R sided environment, rests to L of midline with gaze and limited eye contact during assessment and unable to fully assess ability to visually track as patient not attending to task. At end of session did respond to wife's voice and turn head to R to attend to spouse  Balance  Sitting Balance: Minimal assistance(fluctuates between CGA and mod A seated at EOB for ~8-10 minutes)  Standing Balance: Unable to assess(comment)  ADL  Additional Comments: No ADL completed this date  Tone RUE  RUE Tone: Normotonic  Tone LUE  LUE Tone: Normotonic  Coordination  Movements Are Fluid And Coordinated: No  Coordination and Movement description: Right UE;Fine motor impairments;Gross motor impairments;Decreased accuracy; Decreased speed     Bed mobility  Rolling to Right: Moderate assistance  Supine to Sit: 2 Person assistance(max A of 2)  Sit to Supine: 2 Person assistance(max A of 2)  Scooting: Maximal assistance  Transfers  Transfer Comments: Unsafe to assess this date secondary to inability to follow commands, severe inattention, R side neglect, expressive/receptive aphasia, and R side weakness     Cognition  Overall Cognitive Status: Exceptions  Arousal/Alertness: Inconsistent responses to stimuli  Following Commands: Inconsistently follows commands  Attention Span: Difficulty attending to directions  Problem Solving: Assistance required to implement solutions;Assistance required to generate solutions  Initiation: Requires cues for all  Sequencing: Requires cues for all  Cognition Comment: Very poor attention to tasks, especially when cued to attend to (R) of midline. Pt able to sustain attention when looking at wife to the (R) after returning to supine position in bed.  Pt easily frustrated when attempting to verbalize thoughts, benefits from encouragement and increased time  Perception  Overall Perceptual Status: Impaired  Unilateral Attention: Cues to attend right visual field;Cues to maintain midline in sitting;Cues to attend to right side of body     Sensation  Overall Sensation Status: (unable to formally assess due to aphasia, appears to have diminished sensation in RUE and RLE)        LUE AROM (degrees)  LUE AROM : WFL  RUE PROM (degrees)  RUE PROM: WFL  RUE AROM (degrees)  RUE General AROM: No active movement noted when prompted - pt able to use RUE to reach and touch mouth briefly, but unable to sustain contraction to hold RUE near mouth                      Plan   Plan  Times per week: 5-7  Times per day: Daily  Current Treatment Recommendations: Strengthening, Endurance Training, Balance Training, Functional Mobility Training, Safety Education & Training, Equipment Evaluation, Education, & procurement, Self-Care / ADL, Patient/Caregiver Education & Training    G-Code     OutComes Score                                                  AM-PAC Score

## 2020-10-14 NOTE — PLAN OF CARE
Problem: Nutrition  Goal: Optimal nutrition therapy  Outcome: Ongoing     Nutrition Problem #1: Predicted inadequate energy intake  Intervention: Food and/or Nutrient Delivery: Continue Current Diet, Start Oral Nutrition Supplement  Nutritional Goals: Consume >50% of meals and ONS

## 2020-10-14 NOTE — PROGRESS NOTES
Hospitalist Progress Note      PCP: Cezar Gonzalez    Date of Admission: 10/13/2020    Chief Complaint: Confusion and right-sided weakness    Hospital Course: This morning patient seems comfortable  Seems to have some receptive and expressive aphasia  Recognizes family members follows some commands  Blood pressure 200s started on losartan  Denies any chest pain  Troponin 0 0.05  No shortness of breath  No acute events on telemetry    Medications:  Reviewed      Exam:    BP (!) 174/83   Pulse 62   Temp 98.2 °F (36.8 °C) (Oral)   Resp 16   Ht 6' (1.829 m)   Wt 181 lb 9.6 oz (82.4 kg)   SpO2 93%   BMI 24.63 kg/m²     General appearance: No apparent distress, appears stated age and cooperative. HEENT: Pupils equal, round, and reactive to light. Conjunctivae/corneas clear. Neck: Supple, with full range of motion. No jugular venous distention. Trachea midline. Respiratory:  Normal respiratory effort. Clear to auscultation, bilaterally without RALES/WHEEZES/Rhonchi. Cardiovascular: Regular rate and rhythm with normal S1/S2 without MURMURS, rubs or gallops. Abdomen: Soft, non-tender, non-distended with normal bowel sounds. Musculoskeletal: No clubbing, cyanosis or EDEMA bilaterally. Full range of motion without deformity. Skin: Skin color, texture, turgor normal.  No rashes or lesions.   Neurologic: Unable to do visual field testing  Rest cranial nerves are intact  Right upper extremity power 2+/5 right lower extremity power 2/5  Decreased tone on the right side  Left upper extremity and lower extremity power 5/5  Reflexes present  Extensor plantar response on the right side  Labs:   Recent Labs     10/13/20  0640 10/14/20  0441   WBC 6.2 8.3   HGB 16.0 15.1   HCT 45.8 43.6    156     Recent Labs     10/13/20  0640 10/14/20  0441    139   K 3.9 3.4*    105   CO2 25 24   BUN 13 14   CREATININE 1.2 1.2   CALCIUM 9.6 8.9     Recent Labs     10/13/20  0640 10/14/20  0441   AST 18 19 ALT 21 19   BILIDIR  --  0.3   BILITOT 1.5* 1.6*   ALKPHOS 104 89     Recent Labs     10/13/20  0640 10/14/20  0441   INR 1.07 1.20*     Recent Labs     10/13/20  1030 10/14/20  0441 10/14/20  1104   TROPONINI <0.01 0.05* 0.06*       Urinalysis:      Lab Results   Component Value Date    NITRU Negative 02/28/2015    WBCUA None seen 02/28/2015    BACTERIA Rare 02/28/2015    RBCUA 0-2 02/28/2015    BLOODU TRACE-LYSED 02/28/2015    SPECGRAV 1.020 02/28/2015    GLUCOSEU 250 02/28/2015       Radiology:  MRI BRAIN WO CONTRAST   Final Result   Large acute left PCA vascular territory infarct. The findings were sent to the Radiology Results [address] at 7:07   pm on 10/13/2020to be communicated to a licensed caregiver. XR CHEST (2 VW)   Final Result   1. Superior mediastinal widening, likely due to prominent vasculature, given   appearance on CT angiography of the neck performed just prior to this. 2.  Hazy opacity at the right lung base with volume loss in the right   hemithorax, suggesting atelectasis. CTA HEAD NECK W CONTRAST   Final Result   Partial occlusion in the mid to distal V4 segment of the right vertebral   artery. There is up to 40% stenosis in mid V4 segment of the left vertebral   artery. Partial occlusion in the P2 segment of the left PCA. 75% stenosis at the origin of the a M2 branch of the right MCA. 70% stenosis at the origin of the right common carotid artery. 50% stenosis at the origin of the right internal carotid artery. 30% stenosis at the origin of the left internal carotid artery. 70% severe at the distal V1 segment of the right vertebral artery. Results were reported to Dr. Jimmy Gonzalez at 4:07 p.m. on October 13, 2020. CT HEAD WO CONTRAST   Final Result   Posteriorly in the periventricular region on the right there is a nodular   area of increased density measuring 7 mm. This appears somewhat tubular in   character. Brooks Portillo This could represent a small a cavernoma or venous angioma. However a small area of hemorrhage could also have this appearance. There is a bulbous appearance to the middle cerebral artery distally on the   right, either due to tortuous vessel or small aneurysm. Atrophy and small-vessel ischemic change      Results discussed with [facility] by Raj Farias.  Kelvin Person MD at 7:06 am on   10/13/2020             Assessment/Plan:    Active Hospital Problems    Diagnosis Date Noted    Acute CVA (cerebrovascular accident) [facility]) [I63.9] 10/13/2020       Acute Medical Issues Being Addressed:    66-year-old gentleman admitted to the hospital with acute right hemiparesis and acute encephalopathy secondary to left sided infarct  MRI showed infarct in the posterior circulation  CT angiogram of the head and neck showed multiple intracranial stenosis  On aspirin and Plavix  A1c 6.4  Echoardiogram normal ejection fraction no wall motion abnormalities no PFO  Will need 30-day Holter monitor  Continue telemetry    Hypertension malignant with endorgan damage  Started on losartan 50  Blood pressure in 170s  We will try and keep blood pressure 160-170 for the next 24 hours  We will then add in amlodipine and uptitrate losartan    Elevated troponin  EKG shows some new T inversions in V3 and V4 as compared to previous EKG  He does not have any chest pain  We will get cardiology consult  On aspirin and Plavix  Heart rate in 60s  On Lipitor full dose    Gabby artery disease  On aspirin and Plavix    Prediabetes  Start Metformin on discharge      L superior mediastinal widening on chest x-ray  His radial pulses are equal more importantly on the CT angiogram of the neck the chest x-ray report acknowledges that that area was included on the CT angiogram and was visualized and showed only prominent vasculature ends at this time will not pursue any further testing     Of the above discussed at length with the patient and his daughter was at the bedside they both voiced understanding were given opportunities to ask questions which were answered    DVT Prophylaxis: sc lovenox   Diet: Dietary Nutrition Supplements: Diabetic Oral Supplement  DIET CARB CONTROL;  Dysphagia Soft and Bite-Sized  Code Status: Full Code      Dispo - once acute medical processes have resolved    Homer Dow MD

## 2020-10-14 NOTE — FLOWSHEET NOTE
Pt had refused PO Lipitor earlier tonight and finally took tonight after much encouragement. Split pill in half and then chewed it and swallowed with juice at bedside. Pt is restless; brief dry. Bath wipe for pericare and brief fastened. Pt sitting up in bed, using left arm to prop himself up and continually attempting to use right arm. Pt repositioned but refused to stay on left or right side. Pt's judgement not currently safe. Placed camera at bedside now for safety monitoring.

## 2020-10-14 NOTE — PROGRESS NOTES
Comprehensive Nutrition Assessment    Type and Reason for Visit:  Initial, Positive Nutrition Screen    Nutrition Recommendations/Plan:     Continue carb control diet  Trial Glucerna ONS BID  Recommend SLP consult to check chewing/swallowing  Monitor intakes and for s/s of chewing difficulties and aspiration    Nutrition Assessment:  Pt is a positive nutrition screen for 2-13 lb wt loss; poor appetite/intake; broken teeth. Per EMR, pt has lost 10% of body wt x 11 mo, which is not siginificant. Pt here with AMS and acute CVA, following a fall at home. Pt has aphasia. Noted some nausea on 10/13. Currently on carb control diet with thin liquids. Per EMR and RN, no s/s of aspiration with current diet but per RN, pt may benefit from SLP consult to assess chewing/swallowing. No intakes currently in I/O flowsheets. Will trial Glucerna ONS and monitor for SLP recs. PMHx includes acute MI, CAD, HTN, HLD, DM. Malnutrition Assessment:  Malnutrition Status:  Insufficient data      Estimated Daily Nutrient Needs:  Energy (kcal):  5220-6424 kcal (25-28 kcal/kg); Weight Used for Energy Requirements:  Current     Protein (g):  82-98g (1-1.2 g/kg); Weight Used for Protein Requirements:  Current        Fluid (ml/day):  1 ml/kcal; Weight Used for Fluid Requirements:  Current      Nutrition Related Findings:  BM 10/14; no edema; K low at 3.4, glucose elevated at 165      Wounds:  None       Current Nutrition Therapies:    DIET CARB CONTROL; Anthropometric Measures:  · Height: 6' (182.9 cm)  · Current Body Weight: 181 lb (82.1 kg)   · Admission Body Weight: 189 lb (85.7 kg)(actual)    · Usual Body Weight: 201 lb (91.2 kg)(per EMR)     · Ideal Body Weight: 178 lbs  · BMI: 24.5  · Adjusted Body Weight:  ; No Adjustment   · BMI Categories: Normal Weight (BMI 18.5-24. 9)       Nutrition Diagnosis:   · Predicted inadequate energy intake related to acute injury/trauma as evidenced by poor intake prior to admission, weight loss(report

## 2020-10-14 NOTE — CONSULTS
father. Home Medications:  Were reviewed and are listed in nursing record. and/or listed below  Prior to Admission medications    Medication Sig Start Date End Date Taking?  Authorizing Provider   HYDROcodone-acetaminophen (NORCO)  MG per tablet Take 1 tablet by mouth every 6 hours as needed for Pain   Yes Historical Provider, MD   Cyanocobalamin (VITAMIN B 12 PO) Take by mouth    Historical Provider, MD   aspirin 81 MG tablet Take 1 tablet by mouth See Admin Instructions Monday Wed Fri 11/4/19   Josselyn Mendoza MD   losartan (COZAAR) 50 MG tablet Take 1 tablet by mouth daily 10/16/19   Josselyn Mendoza MD   labetalol (NORMODYNE) 100 MG tablet Take 1 tablet by mouth every 12 hours 10/16/19   Josselyn Mendoza MD   clopidogrel (PLAVIX) 75 MG tablet Take 1 tablet by mouth daily 7/22/19   Josselyn Mendoza MD   rosuvastatin (CRESTOR) 10 MG tablet TAKE 1 TABLET BY MOUTH EVERY DAY 2/27/19   Josselyn Mendoza MD   amLODIPine (NORVASC) 10 MG tablet Take 1 tablet by mouth daily  Patient taking differently: Take 10 mg by mouth daily Indications: currently only taking 5mg 11/4/19 7/5/18   Josselyn Mendoza MD   Coenzyme Q10 (COQ10) 200 MG CAPS Take 1 tablet by mouth daily 9/28/17   Josselyn Mendoza MD   nitroGLYCERIN (NITROLINGUAL) 0.4 MG/SPRAY 0.4 mg spray Place 1 spray under the tongue every 5 minutes as needed for Chest pain 4/5/17   Josselyn Mendoza MD   sildenafil (VIAGRA) 100 MG tablet Take 1 tablet by mouth as needed for Erectile Dysfunction 5/11/16   Josselyn Mendoza MD        Current Medications:  Current Facility-Administered Medications   Medication Dose Route Frequency Provider Last Rate Last Dose    losartan (COZAAR) tablet 50 mg  50 mg Oral Daily KARINE Panda - CNP   50 mg at 10/14/20 0116    amLODIPine (NORVASC) tablet 10 mg  10 mg Oral Daily KARINE Panda - CNP   10 mg at 10/14/20 0116    labetalol (NORMODYNE) tablet 100 mg  100 mg Oral 2 times per day Litzy Alatorre, APRN - CNP   100 mg at 10/14/20 1026    aspirin EC tablet 81 mg  81 mg Oral Q MWF Sana Borges MD   81 mg at 10/14/20 1026    clopidogrel (PLAVIX) tablet 75 mg  75 mg Oral Daily Sana Borges MD   75 mg at 10/14/20 1026    sodium chloride flush 0.9 % injection 10 mL  10 mL Intravenous 2 times per day Sana Borges MD   10 mL at 10/14/20 1027    sodium chloride flush 0.9 % injection 10 mL  10 mL Intravenous PRN Sana Borges MD   10 mL at 10/14/20 0342    polyethylene glycol (GLYCOLAX) packet 17 g  17 g Oral Daily PRN Sana Borges MD        promethazine (PHENERGAN) tablet 12.5 mg  12.5 mg Oral Q6H PRN Sana Borges MD        Or    [facility]ron [facility] 4 mg  4 mg Intravenous Q6H PRN Sana Borges MD   4 mg at 10/13/20 1644    enoxaparin (LOVENOX) injection 40 mg  40 mg Subcutaneous Daily Sana Borges MD   40 mg at 10/14/20 1026    perflutren lipid microspheres (DEFINITY) injection 1.65 mg  1.5 mL Intravenous ONCE PRN Sana Borges MD        labetalol (NORMODYNE;TRANDATE) injection 10 mg  10 mg Intravenous Q6H PRN Sana Borges MD   10 mg at 10/14/20 1724    atorvastatin (LIPITOR) tablet 80 mg  80 mg Oral Nightly Sana Borges MD   80 mg at 10/13/20 2354    HYDROcodone-acetaminophen (NORCO) 5-325 MG per tablet 2 tablet  2 tablet Oral Q6H PRN Sana Borges MD            Allergies:  Patient has no known allergies.      Review of Systems:     · Unable to obtain  ·     Physical Examination:    Vitals:    10/14/20 1845   BP: (!) 202/84   Pulse: 64   Resp: 16   Temp: 98 °F (36.7 °C)   SpO2: 96%    Weight: 181 lb 9.6 oz (82.4 kg)         General Appearance:  Alert, cooperative, no distress, appears stated age   Head:  Normocephalic, without obvious abnormality, atraumatic   Eyes:  PERRL, conjunctiva/corneas clear       Nose: Nares normal, no drainage or sinus tenderness   Throat: Lips, mucosa, and tongue normal   Neck: Supple, symmetrical, trachea midline, no adenopathy, thyroid: not enlarged, symmetric, no tenderness/mass/nodules, no carotid bruit or JVD       Lungs:   Clear to auscultation bilaterally, respirations unlabored   Chest Wall:  No tenderness or deformity   Heart:  Regular rate and rhythm, S1, S2 normal, no murmur, rub or gallop   Abdomen:   Soft, non-tender, bowel sounds active all four quadrants,  no masses, no organomegaly           Extremities: Extremities normal, atraumatic, no cyanosis or edema   Pulses: 2+ and symmetric   Skin: Skin color, texture, turgor normal, no rashes or lesions   Pysch: Normal mood and affect   Neurologic: He has significant expressive aphasia as well as some comprehension difficulties and is unable to cooperate for detailed mental status testing. Pupils equal round reacting to light. Visual field examination shows a right homonymous hemianopsia. Mild right facial droop. Tongue is in the midline. Motor examination shows right hemiplegia with a strength of 1-2 over 5. Tone was diminished on the right side. Coordination is impaired on the right side and normal on the left. Reflexes absent on the right and 1+ on the left. Right plantar reflexes extensor rest the left is flexor. Gait impaired. No carotid bruit. No neck stiffness.         Labs  CBC:   Lab Results   Component Value Date    WBC 8.3 10/14/2020    RBC 4.92 10/14/2020    HGB 15.1 10/14/2020    HCT 43.6 10/14/2020    MCV 88.7 10/14/2020    RDW 14.2 10/14/2020     10/14/2020     CMP:    Lab Results   Component Value Date     10/14/2020    K 3.4 10/14/2020    K 3.9 10/13/2020     10/14/2020    CO2 24 10/14/2020    BUN 14 10/14/2020    CREATININE 1.2 10/14/2020    GFRAA >60 10/14/2020    GFRAA >60 02/25/2012    AGRATIO 1.5 10/14/2020    LABGLOM 59 10/14/2020    GLUCOSE 165 10/14/2020    PROT 6.1 10/14/2020    CALCIUM 8.9 10/14/2020    BILITOT 1.6 10/14/2020    ALKPHOS 89 10/14/2020    AST 19 10/14/2020    ALT 19 10/14/2020     PT/INR:  No results found for: PTINR  Lab Results   Component Value Date    TROPONINI 0.05 (H) 10/14/2020       EKG:  I have reviewed EKG with the following interpretation:  Impression:  Normal sinus rhythmLeft ventricular hypertrophy with repolarization abnormality Inferior infarct     Pt has CAD with following history:  CABG: (date/details),   Valve replacement (date/details)   GXT/Myoview/Lexiscan: (date)  (results)   Stress/echo: (date) (result)   CATH/PCI:  (date)- (results)  - (# and type of stents) -   ECHO: (date) results EF    %. -Normal global systolic function with an ejection fraction estimated at 70%.   -No regional wall motion abnormalities noted. -Moderate concentric left ventricular hypertrophy noted. -Mild aortic stenosis with a peak velocity of 2.34 m/s and a mean pressure   gradient of 11mmHg. The aortic valve area is estimated at 1.88 cm^2. No   significant regurgitation noted. -There is mild mitral annular calcification noted. -Grade I diastolic dysfunction with normal LV filling pressures. Avg.   E/e'=11.7   -A bubble study was performed and fails to show evidence of shunting. MAUREEN (date) (results)  EP procedures/studies/ablation:  (specific data). Device information:  Event monitor: (date/results)    All testing and labs listed below were personally reviewed. Assessment  Patient Active Problem List   Diagnosis    Essential hypertension, benign    Coronary atherosclerosis of native coronary artery    Palpitations    Coronary artery disease due to lipid rich plaque    Abnormal transaminases    Diverticulitis    Unstable angina ([address]    Midline thoracic back pain    Mixed hyperlipidemia    Bilateral carotid artery stenosis    Acute [address]cerebrovascular [address]) [address] [address]         Plan:    I had the opportunity to review the clinical symptoms and presentation of Atiya Jay. Assessment/Plan:  Active Problems:    Acute CVA (cerebrovascular accident) [facility])  Plan: Per neurology  Elevated Troponin: likely from neurologic insult.  ECG shows LVH with strain. No ACS. No ischemic evaluation needed. Cont aspirin statin         HTN: poorly controlled. Increase labetalol to 200, switch losartan to diovan 320    I will address the patient's cardiac risk factors and adjusted pharmacologic treatment as needed. In addition, I have reinforced the need for patient directed risk factor modification. Tobacco use was discussed with the patient and educated on the negative effects. I have asked the patient to not utilize these agents. Thank you for allowing to us to participate in the care or William Avilez. Further evaluation will be based upon the patient's clinical course and testing results. All questions and concerns were addressed to the patient/family. Alternatives to my treatment were discussed. The note was completed using EMR. Every effort was made to ensure accuracy; however, inadvertent computerized transcription errors may be present.     Lenka Byrd MD 10/14/2020 7:01 PM

## 2020-10-14 NOTE — PROGRESS NOTES
NEUROLOGY FOLLOWUP    HISTORY OF PRESENT ILLNESS :     Gilma Spann is a 76 y.o. male   History was obtained from the dictations in the chart. Patient has significant expressive aphasia and is not able to give any history. Additional history was obtained from the patient's wife. Patient continues to have visual field defect on the right side. Patient also has significant aphasia and speech difficulty as well as right hemiplegia. Detailed history:  Apparently patient has severe hypertension but has not been taking his medications for more than 6 months. He developed some confusion 2 days ago. Later he developed right-sided weakness and had speech difficulty. He was unable to talk and had difficulty with comprehension as well. Onset of symptoms was fairly acute and abrupt and moderately severe. No clear aggravating or relieving factors.   Patient has never had any similar symptoms in the past.  Comorbidities include coronary artery disease hypertension hyperlipidemia and type 2 diabetes    REVIEW OF SYSTEMS       Constitutional:  []   Chills   [x]  Fatigue   []  Fevers   []  Malaise   []  Weight loss     [] Denies all of the above    Respiratory:   []  Cough    []  Shortness of breath         [x] Denies all of the above     Cardiovascular:   []  Chest pain    []  Exertional chest pressure/discomfort           [] Palpitations    []  Syncope     [x] Denies all of the above    Past Medical History:   Diagnosis Date    Acute MI ([facility] [address] 2000    CAD (coronary artery disease)     Hyperlipidemia     Hypertension     Type II or unspecified type diabetes mellitus without mention of complication, not stated as uncontrolled     diet controlled     Family History   Problem Relation Age of Onset    Heart Disease Father     High Blood Pressure Father     High Cholesterol Father     Heart Disease Maternal Grandfather     unable to cooperate for detailed mental status testing. Pupils equal round reacting to light. Visual field examination shows a right homonymous hemianopsia. Mild right facial droop. Tongue is in the midline. Motor examination shows right hemiplegia with a strength of 1-2 over 5. Tone was diminished on the right side. Coordination is impaired on the right side and normal on the left. Reflexes absent on the right and 1+ on the left. Right plantar reflexes extensor rest the left is flexor. Gait impaired. No carotid bruit. No neck stiffness. DATA :  LABS:  General Labs:    CBC:   Lab Results   Component Value Date    WBC 8.3 10/14/2020    RBC 4.92 10/14/2020    HGB 15.1 10/14/2020    HCT 43.6 10/14/2020    MCV 88.7 10/14/2020    MCH 30.8 10/14/2020    MCHC 34.7 10/14/2020    RDW 14.2 10/14/2020     10/14/2020    MPV 7.9 10/14/2020     BMP:    Lab Results   Component Value Date     10/14/2020    K 3.4 10/14/2020    K 3.9 10/13/2020     10/14/2020    CO2 24 10/14/2020    BUN 14 10/14/2020    LABALBU 3.7 10/14/2020    CREATININE 1.2 10/14/2020    CALCIUM 8.9 10/14/2020    GFRAA >60 10/14/2020    GFRAA >60 02/25/2012    LABGLOM 59 10/14/2020    GLUCOSE 165 10/14/2020     RADIOLOGY REVIEW:  I have reviewed radiology image(s) and reports(s) of: MRI brain      IMPRESSION :  Acute left posterior cerebral artery infarction  MRI brain images were independently reviewed by me and again reviewed with the patient and his wife at the bedside.   Global aphasia with both expression and comprehension difficulties  Right hemiparesis  Ataxia  Poorly controlled hypertension  Hyperlipidemia  CT angiogram showed intracranial stenosis involving the posterior circulation  Echocardiogram showed left ventricular hypertrophy  Patient Active Problem List   Diagnosis    Essential hypertension, benign    Coronary atherosclerosis of native coronary artery    Palpitations    Coronary artery disease due to lipid

## 2020-10-14 NOTE — PROGRESS NOTES
Physical Therapy    Facility/Department: [address]  Initial Assessment    NAME: Nisreen Casper  : 1945  MRN: [id number]    Date of Service: 10/14/2020    Discharge Recommendations:  Nisreen Casper scored a 7/24 on the AM-PAC short mobility form. Current research shows that an AM-PAC score of 17 or less is typically not associated with a discharge to the patient's home setting. Based on the patient's AM-PAC score and their current functional mobility deficits, it is recommended that the patient have 5-7 sessions per week of Physical Therapy at d/c to increase the patient's independence. At this time, this patient demonstrates the endurance, and/or tolerance for 3 hours of therapy each day, with a treatment frequency of 5-7x/wk. Please see assessment section for further patient specific details. If patient discharges prior to next session this note will serve as a discharge summary. Please see below for the latest assessment towards goals. 5-7 sessions per week   PT Equipment Recommendations  Equipment Needed: No    Assessment   Body structures, Functions, Activity limitations: Decreased functional mobility ; Decreased strength;Decreased endurance;Decreased safe awareness;Decreased sensation;Decreased ROM; Decreased balance  Assessment: Patient not at baseline function and would benefit from skilled PT to address above deficits and facilitate return to baseline function. Presents with significant R sided weakness, R sided neglect/inattention and communication deficits.  Not safe to return home at this time  Treatment Diagnosis: decreased functional mobility, impaired gait, decreased balance, weakness s/p CVA  Prognosis: Good  Decision Making: High Complexity  Exam: AM-PAC, strength, mobility  Clinical Presentation: unpredictable  PT Education: Plan of Care;Goals;PT Role  Patient Education: provided increased tactile input to RUE/RLE, ways to increase attention to R environment, d/c supine in bed upon arrival with bed alarm on  Subjective  Subjective: Denied pain, limited coherent verbalizations. Patient with significnt difficulty expressing self. Indicated frusteration with evaluation and inability to move. Pain Screening  Patient Currently in Pain: Denies          Orientation  Orientation  Overall Orientation Status: (unable to formally assess due to aphasia)  Social/Functional History  Social/Functional History  Lives With: Spouse  Type of Home: House  Home Layout: One level  Home Access: Stairs to enter without rails  Entrance Stairs - Number of Steps: 1  Bathroom Shower/Tub: Tub/Shower unit  Bathroom Equipment: Grab bars in shower, Shower chair  Home Equipment: Cane, Rolling walker  ADL Assistance: [address]: Independent(shares with wife)  Homemaking Responsibilities: Yes  Ambulation Assistance: Independent(with cane)  Transfer Assistance: Independent  Active : Yes  Leisure & Hobbies: goes boating, hanging out with friends  Additional Comments: 1 fall out of bed leading to this admission, patient unable to provide any information - spouse present to provide above information    Objective     Observation/Palpation  Observation: inattention to RUE/RLE and R sided environment, rests to L of midline with gaze and limited eye contact during assessment and unable to fully assess ability to visually track as patient not attending to task.  At end of session did respond to wife's voice and turn head to R to attend to spouse    PROM RLE (degrees)  RLE PROM: WFL  AROM RLE (degrees)  RLE AROM: (no spontaneous LE movement noted -unable to formally assess due to minimal command following)  AROM LLE (degrees)  LLE AROM : WFL  Strength RLE  Strength RLE: (unable to formally assess, grossly 0/5 but assessment limited by command following/inattention to R)  Strength LLE  Strength LLE: WFL(unable to formally assess)     Sensation  Overall Sensation Status: (unable to formally assess due to aphasia, appears to have diminished sensation in RUE and RLE)  Bed mobility  Rolling to Right: Moderate assistance  Supine to Sit: 2 Person assistance(max A of 2)  Sit to Supine: 2 Person assistance(max A of 2)  Scooting: Maximal assistance  Transfers  Sit to Stand: Unable to assess(unsafe to attempt due to R sided weakness, inattention, inability to follow commands etc)        Balance  Sitting - Static: Fair(CGA for static sitting, intermittent min-mod A provided with postural adjustments)        Plan   Plan  Times per week: 5-7  Times per day: Daily  Current Treatment Recommendations: Strengthening, ROM, Balance Training, Transfer Training, Gait Training, Safety Education & Training, Patient/Caregiver Education & Training  Safety Devices  Type of devices: All fall risk precautions in place, Bed alarm in place, Call light within reach, Nurse notified, Left in bed, Telesitter in use, Patient at risk for falls  Restraints  Initially in place: No    AM-PAC Score  AM-PAC Inpatient Mobility Raw Score : 7 (10/14/20 1521)  AM-PAC Inpatient T-Scale Score : 26.42 (10/14/20 1521)  Mobility Inpatient CMS 0-100% Score: 92.36 (10/14/20 1521)  Mobility Inpatient CMS G-Code Modifier : CM (10/14/20 1521)          Goals  Short term goals  Time Frame for Short term goals:  To be met prior to discharge  Short term goal 1: Bed mobility with max A  Short term goal 2: Sit EOB 3-5 minutes with SBA  Short term goal 3: Sit to/from stand with max A  Short term goal 4: Bed to/from chair with max A  Patient Goals   Patient goals : unable to state       Therapy Time   Individual Concurrent Group Co-treatment   Time In 1359         Time Out 1437         Minutes 38         Timed Code Treatment Minutes: Fatuma Cisneros 87, PT     Thanks, Hayden Villanueva, PT, DPT [id number]

## 2020-10-15 NOTE — PROGRESS NOTES
Patient received from 3T. NIHSS stroke scale complete, refer to [facility] for details. The patient presents this afternoon AOE x1, and is only alert to self. Patient denies pain at this time, and answers yes no questions appropriately. Lung sounds are clear bilaterally, no adventitious sounds noted. Patient's heart sounds are normal, S1 and S2 are audible. Pulses are palpable and skin is warm and dry. Patient has noted scabbing on LLE. Patient's movement in extremities vary, the LUE and LLE have full movement and strength, while the RUE and RLE are flaccid and weak. Patient does not have a henry, and is voiding without difficulty. Patient cannot recall if he is passing gas or not. Patient currently on Cardene drip for BP management. Denies any pain or needs at this time. Family at bedside. Will continue to monitor.

## 2020-10-15 NOTE — FLOWSHEET NOTE
Provider sent the following: \"Pt has been given Zofran IV and is still throwing up.  Please advise\"

## 2020-10-15 NOTE — PROGRESS NOTES
NEUROLOGY FOLLOWUP    HISTORY OF PRESENT ILLNESS :   Patient was seen earlier this afternoon for follow-up  Kyle Thurman is a 76 y.o. male   History was obtained from the dictations in the chart. Patient has significant expressive aphasia and is not able to give any history. Additional history was obtained from the patient's wife. Patient developed nausea and was given Phenergan. After that he became drowsy. His blood pressure shot up to systolic over 837. Patient was transferred to the intensive care unit. Now he is becoming more awake. Patient continues to have visual field defect on the right side. Patient also has significant aphasia and speech difficulty as well as right hemiplegia. Detailed history:  Apparently patient has severe hypertension but has not been taking his medications for more than 6 months. He developed some confusion 2 days ago. Later he developed right-sided weakness and had speech difficulty. He was unable to talk and had difficulty with comprehension as well. Onset of symptoms was fairly acute and abrupt and moderately severe. No clear aggravating or relieving factors.   Patient has never had any similar symptoms in the past.  Comorbidities include coronary artery disease hypertension hyperlipidemia and type 2 diabetes    REVIEW OF SYSTEMS       Constitutional:  []   Chills   [x]  Fatigue   []  Fevers   []  Malaise   []  Weight loss     [] Denies all of the above    Respiratory:   []  Cough    []  Shortness of breath         [x] Denies all of the above     Cardiovascular:   []  Chest pain    []  Exertional chest pressure/discomfort           [] Palpitations    []  Syncope     [x] Denies all of the above    Past Medical History:   Diagnosis Date    Acute MI ([facility] [address] 2000    CAD (coronary artery disease)     Hyperlipidemia     Hypertension     Type II or unspecified type diabetes mellitus without mention of complication, not stated as uncontrolled     diet controlled     Family History   Problem Relation Age of Onset    Heart Disease Father     High Blood Pressure Father     High Cholesterol Father     Heart Disease Maternal Grandfather     Heart Disease Paternal Grandfather      Social History     Socioeconomic History    Marital status:      Spouse name: None    Number of children: None    Years of education: None    Highest education level: None   Occupational History    None   Social Needs    Financial resource strain: None    Food insecurity     Worry: None     Inability: None    Transportation needs     Medical: None     Non-medical: None   Tobacco Use    Smoking status: Former Smoker     Packs/day: 0.00     Years: 20.00     Pack years: 0.00     Last attempt to quit: 1982     Years since quittin.6    Smokeless tobacco: Never Used   Substance and Sexual Activity    Alcohol use: Yes     Alcohol/week: 6.0 standard drinks     Types: 6 Cans of beer per week     Comment:  Occ    Drug use: No    Sexual activity: None   Lifestyle    Physical activity     Days per week: None     Minutes per session: None    Stress: None   Relationships    Social connections     Talks on phone: None     Gets together: None     Attends Congregation service: None     Active member of club or organization: None     Attends meetings of clubs or organizations: None     Relationship status: None    Intimate partner violence     Fear of current or ex partner: None     Emotionally abused: None     Physically abused: None     Forced sexual activity: None   Other Topics Concern    None   Social History Narrative    None        PHYSICAL EXAMINATION      BP (!) 151/83   Pulse 80   Temp 99.1 °F (37.3 °C) (Temporal)   Resp 15   Ht 6' (1.829 m)   Wt 176 lb 14.4 oz (80.2 kg)   SpO2 94%   BMI 23.99 kg/m²   This is a well-nourished patient in no acute distress  Patient is awake and alert. He has significant expressive aphasia as well as some comprehension difficulties and is unable to cooperate for detailed mental status testing. Pupils equal round reacting to light. Visual field examination shows a right homonymous hemianopsia. Mild right facial droop. Tongue is in the midline. Motor examination shows right hemiplegia with a strength of 1-2 over 5. Tone was diminished on the right side. Coordination is impaired on the right side and normal on the left. Reflexes absent on the right and 1+ on the left. Right plantar reflexes extensor rest the left is flexor. Gait impaired. No carotid bruit. No neck stiffness. DATA :  LABS:  General Labs:    CBC:   Lab Results   Component Value Date    WBC 8.3 10/14/2020    RBC 4.92 10/14/2020    HGB 16.3 10/15/2020    HCT 43.6 10/14/2020    MCV 88.7 10/14/2020    MCH 30.8 10/14/2020    MCHC 34.7 10/14/2020    RDW 14.2 10/14/2020     10/14/2020    MPV 7.9 10/14/2020     BMP:    Lab Results   Component Value Date     10/14/2020    K 3.4 10/14/2020    K 3.9 10/13/2020     10/14/2020    CO2 24 10/14/2020    BUN 14 10/14/2020    LABALBU 3.7 10/14/2020    CREATININE 1.2 10/14/2020    CALCIUM 8.9 10/14/2020    GFRAA >60 10/14/2020    GFRAA >60 02/25/2012    LABGLOM 59 10/14/2020    GLUCOSE 165 10/14/2020     RADIOLOGY REVIEW:  I have reviewed radiology image(s) and reports(s) of: MRI brain      IMPRESSION :  Acute left posterior cerebral artery infarction  MRI brain images were reviewed. Repeat CT head images from this morning were also reviewed.   Global aphasia with both expression and comprehension difficulties  Right hemiparesis  Ataxia  Poorly controlled hypertension  Hyperlipidemia  CT angiogram showed intracranial stenosis involving the posterior circulation  Echocardiogram showed left ventricular hypertrophy  Patient Active Problem List   Diagnosis    Essential hypertension, benign    Coronary atherosclerosis of native coronary artery    Palpitations    Coronary artery disease due to lipid rich plaque    Abnormal transaminases    Diverticulitis    Unstable angina (HCC)    Midline thoracic back pain    Mixed hyperlipidemia    Bilateral carotid artery stenosis    Acute CVA (cerebrovascular accident) ([address]       RECOMMENDATIONS :  Discussed with patient and his wife  Discussed with Dr. Audra Gooden  Aspirin   Statin therapy  Continue to slowly control hypertension over the next few days. I would recommend that we try to maintain a systolic blood pressure around 150-160  Physical therapy and Occupational Therapy evaluation  Patient may need inpatient rehabilitation unit  High complexity patient            Please note a portion of this chart was generated using dragon dictation software. Although every effort was made to ensure the accuracy of this automated transcription, some errors in transcription may have occurred.          Paul Oakes M.D.

## 2020-10-15 NOTE — PROGRESS NOTES
Hospitalist Progress Note      PCP: Juliet Mathew    Date of Admission: 10/13/2020    Chief Complaint: Confusion and right-sided weakness    Hospital Course: This morning patient a little more sleepy  Has right-sided weakness  Opens eyes to commands is able to give me his name but unable to recognize his daughter was at the bedside  Blood pressure again in 200s  Received some IV labetalol  Has had some nausea and vomiting  No no bright red blood in there  Had some episodes of diarrhea but C. difficile negative      Medications:  Reviewed      Exam:    BP (!) 204/115   Pulse 76   Temp 98.9 °F (37.2 °C) (Temporal)   Resp 15   Ht 6' (1.829 m)   Wt 176 lb 14.4 oz (80.2 kg)   SpO2 92%   BMI 23.99 kg/m²     General appearance: Drowsy opens eyes to commands  HEENT: Pupils equal, round, and reactive to light. Conjunctivae/corneas clear. Neck: Supple, with full range of motion. No jugular venous distention. Trachea midline. Respiratory:  Normal respiratory effort. Clear to auscultation, bilaterally without RALES/WHEEZES/Rhonchi. Cardiovascular: Regular rate and rhythm with normal S1/S2 without MURMURS, rubs or gallops. Abdomen: Soft, non-tender, non-distended with normal bowel sounds. Musculoskeletal: No clubbing, cyanosis or EDEMA bilaterally. Full range of motion without deformity. Skin: Skin color, texture, turgor normal.  No rashes or lesions.   Neurologic: Unable to do visual field testing  Rest cranial nerves are intact  Right upper extremity power 2+/5 right lower extremity power 2/5  Decreased tone on the right side  Left upper extremity and lower extremity power 5/5  Reflexes present  Extensor plantar response on the right side  Other than the drowsiness nothing else is changed on his physical exam no new neurological findings except for the ones which are described above  Labs:   Recent Labs     10/13/20  0640 10/14/20  0441   WBC 6.2 8.3   HGB 16.0 15.1   HCT 45.8 43.6    156 Recent Labs     10/13/20  0640 10/14/20  0441    139   K 3.9 3.4*    105   CO2 25 24   BUN 13 14   CREATININE 1.2 1.2   CALCIUM 9.6 8.9     Recent Labs     10/13/20  0640 10/14/20  0441   AST 18 19   ALT 21 19   BILIDIR  --  0.3   BILITOT 1.5* 1.6*   ALKPHOS 104 89     Recent Labs     10/13/20  0640 10/14/20  0441   INR 1.07 1.20*     Recent Labs     10/14/20  1714 10/14/20  2313 10/15/20  0529   TROPONINI 0.05* 0.03* 0.03*       Urinalysis:      Lab Results   Component Value Date    NITRU Negative 02/28/2015    WBCUA None seen 02/28/2015    BACTERIA Rare 02/28/2015    RBCUA 0-2 02/28/2015    BLOODU TRACE-LYSED 02/28/2015    SPECGRAV 1.020 02/28/2015    GLUCOSEU 250 02/28/2015       Radiology:  MRI BRAIN WO CONTRAST   Final Result   Large acute left PCA vascular territory infarct. The findings were sent to the Radiology Results [address] at 7:07   pm on 10/13/2020to be communicated to a licensed caregiver. XR CHEST (2 VW)   Final Result   1. Superior mediastinal widening, likely due to prominent vasculature, given   appearance on CT angiography of the neck performed just prior to this. 2.  Hazy opacity at the right lung base with volume loss in the right   hemithorax, suggesting atelectasis. CTA HEAD NECK W CONTRAST   Final Result   Partial occlusion in the mid to distal V4 segment of the right vertebral   artery. There is up to 40% stenosis in mid V4 segment of the left vertebral   artery. Partial occlusion in the P2 segment of the left PCA. 75% stenosis at the origin of the a M2 branch of the right MCA. 70% stenosis at the origin of the right common carotid artery. 50% stenosis at the origin of the right internal carotid artery. 30% stenosis at the origin of the left internal carotid artery. 70% severe at the distal V1 segment of the right vertebral artery. Results were reported to Dr. José Miguel Robison at 4:07 p.m. on October 13, 2020. CT HEAD WO CONTRAST   Final Result   Posteriorly in the periventricular region on the right there is a nodular   area of increased density measuring 7 mm. This appears somewhat tubular in   character. .  This could represent a small a cavernoma or venous angioma. However a small area of hemorrhage could also have this appearance. There is a bulbous appearance to the middle cerebral artery distally on the   right, either due to tortuous vessel or small aneurysm. Atrophy and small-vessel ischemic change      Results discussed with [facility] by Arnie Tobar.  Myra Gallagher MD at 7:06 am on   10/13/2020         CT HEAD WO CONTRAST    (Results Pending)   XR ABDOMEN (KUB) (SINGLE AP VIEW)    (Results Pending)       Assessment/Plan:    Active Hospital Problems    Diagnosis Date Noted    Acute CVA (cerebrovascular accident) ([facility] [address] [I63.9] 10/13/2020       Acute Medical Issues Being Addressed:    80-year-old gentleman admitted to the hospital with acute right hemiparesis and acute encephalopathy secondary to left sided infarct  MRI showed infarct in the posterior circulation  CT angiogram of the head and neck showed multiple intracranial stenosis  On aspirin and Plavix  A1c 6.4  Echoardiogram normal ejection fraction no wall motion abnormalities no PFO  Will need 30-day Holter monitor  Continue telemetry    Acute encephalopathy multifactorial probably related to CVA although given associated nausea vomiting and some drowsiness will get urine analysis urine culture will get blood gases  We will get a stat CT of the head    Nausea vomiting diarrhea  LFTs are normal  Abdominal examination is benign  We will get a KUB  We will keep n.p.o. for now      Hypertension malignant with endorgan damage  He is on labetalol 200 mg 3 times a day, losartan 50 and amlodipine 10  He is unable to take anything oral as he has had this nausea and vomiting  Was given IV labetalol  At this point I suspect that his blood pressure certainly has a role to play we will transfer him to ICU start him on a nicardipine drip maintain systolic blood pressure between 160 170      Elevated troponin  EKG shows some new T inversions in V3 and V4 as compared to previous EKG  He does not have any chest pain  Troponins more or less flat  Seen by cardiology    On aspirin and Plavix  Heart rate in 60s  On Lipitor full dose    Coronary  artery disease  On aspirin and Plavix    Prediabetes  Start Metformin on discharge      L superior mediastinal widening on chest x-ray  His radial pulses are equal more importantly on the CT angiogram of the neck the chest x-ray report acknowledges that that area was included on the CT angiogram and was visualized and showed only prominent vasculature ends at this time will not pursue any further testing     All of the above discussed at length with the daughter who voiced understanding and is in agreement with this plan      Addendum  I saw the patient again at around 2:20 PM  Patient on nicardipine drip  Blood pressure in systolic 552L  Patient is awake at this point he is having expressive aphasia  Still not able to move his right side  Follow some commands but not all of them  At this point will continue to keep blood pressure around 160  Get speech evaluation  Has not had any further nausea vomiting  I wonder whether antiemetics have made him a little more drowsy  CT of the head noted  KG shows evidence of LVH no acute changes  I spoke to the other daughter was at the bedside explained that at this point we will have to wait and watch  Blood pressure control  Keep n.p.o. until seen by speech therapy    DVT Prophylaxis: sc lovenox   Diet: Diet NPO Effective Now  Code Status: Full Code      Dispo - once acute medical processes have resolved    Bethany Rao MD

## 2020-10-15 NOTE — PROGRESS NOTES
Medicated with Labetalol 10 mg IVP per cardiology recommendation. Dr Gumaro Negron increased frequency of Labetalol from every 6 hours prn to every 2 hours prn with continuous Cardene infusing.  Current /79

## 2020-10-15 NOTE — PROGRESS NOTES
Pt trying to get out of bed and yelling \"goddamn it no\". Pt legs put back into bed twice and pt grabbed and hit this RN. Pt was told that was unacceptable. Pt asked if he needed anything, pt asked if he needed to poop, if pt was thirsty, if pt was hurting, if pt was cold. Pt unable to communicate needs and nothing he was asked was what he needed. SHOBHA.

## 2020-10-15 NOTE — PROGRESS NOTES
Afternoon assessment complete. Patient's family is at bedside. Patient denies any needs at this time when addressed with yes or no questions. Cardene drip infusing, refer to [facility] for details. Current BP is 172/81 (MAP of 105). Will continue to monitor. Patient denies any pain. Family denies any needs.

## 2020-10-15 NOTE — PLAN OF CARE
0720 nihss 17 at hand-off. Emesis, treated with zofran @ 0330. 0830 secure message sent to  regarding n/v/d. 0900 given iv phenergan. 0930 Assessment complete, see flow sheet. Lethargic. Emesis -thick brown. BP (!) 213/104   Pulse 97   Temp 98.9 °F (37.2 °C) (Axillary)   Resp 18   Ht 6' (1.829 m)   Wt 176 lb 14.4 oz (80.2 kg)   SpO2 92%   BMI 23.99 kg/m²  room air. Oral medications held. Given iv lobetolol & zofran. 56 Spoke with dr in department, new orders being placed. 1045 to CT per bed with RN after being placed on lift sheet. Report called to [facility] in ICU. Nahomi Feliciano RN, BSN, PCCN.

## 2020-10-15 NOTE — PROGRESS NOTES
Speech Language Pathology    Tyree Rojas  1945    Attempted to see patient for speech/dysphagia therapy. Pt is currently off of floor for CT and is transferring to ICU per RN report. Will attempt to follow-up later this date as schedule allows and as Pt is medically appropriate.     Michelle Bass M.A., [id number] [facility]  Speech-Language Pathologist

## 2020-10-15 NOTE — PROGRESS NOTES
Daughter @ bedside this morning since prior to 0720, updated frequently on the patient's status & updated with each new order from Dr. Ru Davidson of transfer to ICU after stat testing. Kari East RN, BSN, PCCN.

## 2020-10-15 NOTE — PLAN OF CARE
Problem: Falls - Risk of:  Goal: Will remain free from falls  Description: Will remain free from falls  Outcome: Met This Shift  Goal: Absence of physical injury  Description: Absence of physical injury  Outcome: Met This Shift     Problem: Skin Integrity:  Goal: Absence of new skin breakdown  Description: Absence of new skin breakdown  Outcome: Met This Shift     Problem: Injury - Risk of, Physical Injury:  Goal: Will remain free from falls  Description: Will remain free from falls  Outcome: Met This Shift  Goal: Absence of physical injury  Description: Absence of physical injury  Outcome: Met This Shift   Pt remained fee of injury and falls this shift. No new skin breakdown. [facility]  Problem: Confusion - Acute:  Goal: Absence of continued neurological deterioration signs and symptoms  Description: Absence of continued neurological deterioration signs and symptoms  Outcome: Ongoing  Pt has confusion most of the shift. Problem: Mood - Altered:  Goal: Mood stable  Description: Mood stable  Outcome: Ongoing  Mood fluctuates throughout assessment, rounding, and repositioning. Goal: Absence of abusive behavior  Description: Absence of abusive behavior  Outcome: Ongoing  Pt hit staff during repositioning and during attempts to keep him safe and in bed.       Problem: Sensory Perception - Impaired:  Goal: Demonstrations of improved sensory functioning will increase  Description: Demonstrations of improved sensory functioning will increase  Outcome: Ongoing

## 2020-10-15 NOTE — PROGRESS NOTES
[address] Daily Progress Note      Admit Date:  10/13/2020    Chief Complaint   Patient presents with    Altered Mental Status     pt brought to ed by [facility] ems after wife found him on floor, unsure how long pt confused unable to follow directions and answer questions correctly         Subjective:  Mr. Gumaro Romero remains hypertensive, >200. denies exertional chest pain, SOB/POST, PND, palpitations, light-headedness, or edema. Unable to provide history due to aphasia. Has had persistent nausea. Not able to swallow yet.     Objective:   BP (!) 181/83   Pulse 67   Temp 97.5 °F (36.4 °C) (Axillary)   Resp 19   Ht 6' (1.829 m)   Wt 176 lb 14.4 oz (80.2 kg)   SpO2 94%   BMI 23.99 kg/m²   No intake or output data in the 24 hours ending 10/15/20 0834    TELEMETRY: Sinus     Physical Exam:  General:  Awake, alert, oriented x 0, NAD  Skin:  Warm and dry  Neck:  JVD flat  Chest:  normal air entry  Cardiovascular:  RRR S1S2, no S3, no mrmr  Abdomen:  Soft, ND, NT, No HSM  Extremities:  No edema    Medications:    labetalol  200 mg Oral 3 times per day    valsartan  160 mg Oral Daily    amLODIPine  10 mg Oral Daily    aspirin  81 mg Oral Q MWF    clopidogrel  75 mg Oral Daily    sodium chloride flush  10 mL Intravenous 2 times per day    enoxaparin  40 mg Subcutaneous Daily    atorvastatin  80 mg Oral Nightly       sodium chloride flush, polyethylene glycol, promethazine **OR** ondansetron, perflutren lipid microspheres, labetalol, HYDROcodone 5 mg - acetaminophen    Lab Data:  CBC:   Recent Labs     10/13/20  0640 10/14/20  0441   WBC 6.2 8.3   HGB 16.0 15.1   HCT 45.8 43.6   MCV 88.9 88.7    156     BMP:   Recent Labs     10/13/20  0640 10/14/20  0441    139   K 3.9 3.4*    105   CO2 25 24   BUN 13 14   CREATININE 1.2 1.2     LIVER PROFILE:   Recent Labs     10/13/20  0640 10/14/20  0441   AST 18 19   ALT 21 19   BILIDIR  --  0.3   BILITOT 1.5* 1.6*   ALKPHOS 104 89     PT/INR:

## 2020-10-15 NOTE — PROGRESS NOTES
Pt denied pain throughout the day. Pt answered consistently answers no when asked \"Are you in pain? \"   Pt restless, moves left arm and leg often and has attempted to get out of bed several times today. Per PT pt unable to bear weight on right leg and recommending bed pan only for toileting. Pt had 3 large watery, mucous stools in diaper today,and soaked diaper with urine as well. Pt's spouse at bedside most of day. EKG completed, Troponins resulted and MD notified. Cardiology consult placed. Difficult to assess NIHSS score, pt intermittently able to perform some tasks depending on pt's transient mentation.

## 2020-10-16 NOTE — PROGRESS NOTES
Physical Therapy  Facility/Department: [facility] ICU  Daily Treatment Note  NAME: Eliezer Mullins  : 1945  MRN: [id number]    Date of Service: 10/16/2020    Discharge Recommendations:Jose Maria Rojas scored a  on the AM-PAC short mobility form. Current research shows that an AM-PAC score of 17 or less is typically not associated with a discharge to the patient's home setting. Based on the patient's AM-PAC score and their current functional mobility deficits, it is recommended that the patient have 5-7 sessions per week of Physical Therapy at d/c to increase the patient's independence. At this time, this patient demonstrates the endurance, and/or tolerance for 3 hours of therapy each day, with a treatment frequency of 5-7x/wk. Please see assessment section for further patient specific details. If patient discharges prior to next session this note will serve as a discharge summary. Please see below for the latest assessment towards goals. 5-7 sessions per week   PT Equipment Recommendations  Equipment Needed: No    Assessment   Body structures, Functions, Activity limitations: Decreased functional mobility ; Decreased strength;Decreased endurance;Decreased safe awareness;Decreased sensation;Decreased ROM; Decreased balance;Decreased posture  Assessment: Patient not at baseline function and would benefit from skilled PT to address above deficits and facilitate return to baseline function. Presents with significant R sided weakness, R sided neglect/inattention and communication deficits.  Not safe to return home at this time  Treatment Diagnosis: decreased functional mobility, impaired gait, decreased balance, weakness s/p CVA  Prognosis: Good  PT Education: Plan of Care;Goals;PT Role  Patient Education: provided increased tactile input to RUE/RLE, ways to increase attention to R environment, d/c recommendations - spouse/daughter verbalized understanding (patient unable to verbalize understanding due to aphasia)  Barriers to Learning: language, physical  REQUIRES PT FOLLOW UP: Yes  Activity Tolerance  Activity Tolerance: Patient Tolerated treatment well;Patient limited by fatigue     Patient Diagnosis(es): The primary encounter diagnosis was Disorientation. Diagnoses of Secondary hypertension, Chest pain, unspecified type, and Cerebral infarction due to bilateral stenosis of vertebral arteries ([facility] [address] were also pertinent to this visit. has a past medical history of Acute MI ([facility] [address] CAD (coronary artery disease), Hyperlipidemia, Hypertension, and Type II or unspecified type diabetes mellitus without mention of complication, not stated as uncontrolled. has a past surgical history that includes Coronary angioplasty with stent (2000, 2/24/2012); Hand surgery (5/09); ESOPHAGOSCOPY; back surgery (2018); Hand surgery (Left, 2009); and Cardiac surgery. Restrictions  Restrictions/Precautions  Restrictions/Precautions: Fall Risk, Modified Diet(high fall risk, dys soft and bite sized)  Required Braces or Orthoses?: No  Position Activity Restriction  Other position/activity restrictions: Rehana Vergara is a 76 y.o. male who presents to the emergency department fall. This morning wife noted that he was on the ground and she called EMS because she was unable to get them up. She states that over the last 48 hours he has been feeling unwell. She did not notice any significant weakness or confusion but states that he has been slower than usual over the last 48 hours. MRI showed \"Large acute left PCA vascular territory infarct\"     Subjective   General  Chart Reviewed: Yes  Family / Caregiver Present: Yes(daughter entered during treatment)  Subjective  Subjective: Pt denies pain;  Pt with limited communiation; can state \"yes\" and \"no\"; did follow minimal simple commands; stated \"I don't understand\"  General Comment  Comments: supine in bed upon arrival with bed alarm on       Orientation  Orientation  Overall Orientation Status: Impaired(unable to state; did state \"yes\" in response to his name being stated; limited by aphasia)     Objective   Bed mobility  Rolling to Left: Dependent/Total;2 Person assistance(max A of 2)  Supine to Sit: Dependent/Total;2 Person assistance(max-Dep of 2)  Scooting: Dependent/Total     Transfers  Sit to Stand: Dependent/Total;2 Person Assistance(mod A of 2 from bed; max A of 2 from chair; R knee being blocked)  Stand to sit: 2 Person Assistance;Dependent/Total(mod-max A of 2)  Bed to Chair: Dependent/Total;2 Person Assistance(mod A of 2; R knee blocked with transfer to pt's left side; pt stood and was weight bearing on BLEs)  Comment: pt stood from bed for transfer to chair; stood from chair 1x, tactile and verbal cues; min-no R quad activation palpated in stance during brief stand     Ambulation  Ambulation?: No     Balance  Sitting - Static: Fair(assist varied CGA-min/mod A; leans R; tactile and verbal cues to look upright and attend to R side of visual field)  Sitting - Dynamic: Poor(mod A; OT assisted with face washing, combing hair on EOB and then chair)  Standing - Static: Poor(mod A of 2)  Standing - Dynamic: Poor(mod A of 2)         AM-PAC Score  AM-PAC Inpatient Mobility Raw Score : 7 (10/16/20 1146)  AM-PAC Inpatient T-Scale Score : 26.42 (10/16/20 1146)  Mobility Inpatient CMS 0-100% Score: 92.36 (10/16/20 1146)  Mobility Inpatient CMS G-Code Modifier : CM (10/16/20 1146)     Goals  Short term goals  Time Frame for Short term goals:  To be met prior to discharge  Short term goal 1: Bed mobility with max A  Short term goal 2: Sit EOB 3-5 minutes with SBA  Short term goal 3: Sit to/from stand with max A  Short term goal 4: Bed to/from chair with max A  Patient Goals   Patient goals : unable to state  No goals met    Plan    Plan  Times per week: 5-7  Times per day: Daily  Current Treatment Recommendations: Strengthening, ROM, Balance Training, Transfer Training, Gait Training, Safety Education & Training, Patient/Caregiver Education & Training  Safety Devices  Type of devices:  All fall risk precautions in place, Call light within reach, Chair alarm in place, Gait belt, Patient at risk for falls, Left in chair, Nurse notified(KAT Hollingsworth; daughter present in room; pt reclined with LEs elevated due to pt closing eyes, decreased sitting balance)  Restraints  Initially in place: No     Therapy Time   Individual Concurrent Group Co-treatment   Time In       1035   Time Out       1117   Minutes       42   Timed Code Treatment Minutes: Jose De Jesus Judd [address], [address] [facility], [address] [place] [address]

## 2020-10-16 NOTE — PROGRESS NOTES
NEUROLOGY FOLLOWUP    HISTORY OF PRESENT ILLNESS :   Patient was seen earlier this afternoon for follow-up  Sandi Crawford is a 76 y.o. male   History was obtained from the dictations in the chart. Patient has significant expressive aphasia and is not able to give any history. Additional history was obtained from the patient's wife. Patient feels somewhat better today. His blood pressure is better controlled and there is systolic blood pressure now in the 130s. Patient continues to have visual field defect on the right side. Patient also has significant aphasia and speech difficulty as well as right hemiplegia. Detailed history:  Apparently patient has severe hypertension but has not been taking his medications for more than 6 months. He developed some confusion 2 days ago. Later he developed right-sided weakness and had speech difficulty. He was unable to talk and had difficulty with comprehension as well. Onset of symptoms was fairly acute and abrupt and moderately severe. No clear aggravating or relieving factors.   Patient has never had any similar symptoms in the past.  Comorbidities include coronary artery disease hypertension hyperlipidemia and type 2 diabetes    REVIEW OF SYSTEMS       Constitutional:  []   Chills   [x]  Fatigue   []  Fevers   []  Malaise   []  Weight loss     [] Denies all of the above    Respiratory:   []  Cough    []  Shortness of breath         [x] Denies all of the above     Cardiovascular:   []  Chest pain    []  Exertional chest pressure/discomfort           [] Palpitations    []  Syncope     [x] Denies all of the above    Past Medical History:   Diagnosis Date    Acute MI ([facility] [address] 2000    CAD (coronary artery disease)     Hyperlipidemia     Hypertension     Type II or unspecified type diabetes mellitus without mention of complication, not stated as uncontrolled     diet controlled     Family History   Problem Relation Age of Onset    Heart Disease Father     High Blood Pressure Father     High Cholesterol Father     Heart Disease Maternal Grandfather     Heart Disease Paternal Grandfather      Social History     Socioeconomic History    Marital status:      Spouse name: None    Number of children: None    Years of education: None    Highest education level: None   Occupational History    None   Social Needs    Financial resource strain: None    Food insecurity     Worry: None     Inability: None    Transportation needs     Medical: None     Non-medical: None   Tobacco Use    Smoking status: Former Smoker     Packs/day: 0.00     Years: 20.00     Pack years: 0.00     Last attempt to quit: 1982     Years since quittin.6    Smokeless tobacco: Never Used   Substance and Sexual Activity    Alcohol use: Yes     Alcohol/week: 6.0 standard drinks     Types: 6 Cans of beer per week     Comment: Occ    Drug use: No    Sexual activity: None   Lifestyle    Physical activity     Days per week: None     Minutes per session: None    Stress: None   Relationships    Social connections     Talks on phone: None     Gets together: None     Attends Religion service: None     Active member of club or organization: None     Attends meetings of clubs or organizations: None     Relationship status: None    Intimate partner violence     Fear of current or ex partner: None     Emotionally abused: None     Physically abused: None     Forced sexual activity: None   Other Topics Concern    None   Social History Narrative    None        PHYSICAL EXAMINATION      BP (!) 155/80   Pulse 79   Temp 98.3 °F (36.8 °C) (Temporal)   Resp 21   Ht 6' (1.829 m)   Wt 176 lb 14.4 oz (80.2 kg)   SpO2 96%   BMI 23.99 kg/m²   This is a well-nourished patient in no acute distress  Patient is awake and alert.   He has significant expressive aphasia as well as some comprehension rich plaque    Abnormal transaminases    Diverticulitis    Unstable angina (HCC)    Midline thoracic back pain    Mixed hyperlipidemia    Bilateral carotid artery stenosis    Acute CVA (cerebrovascular accident) ([address]       RECOMMENDATIONS :  Discussed with patient and his wife  Discussed with Dr. Yumiko Sanders  Aspirin   Statin therapy  Agree with trying to keep the systolic blood pressure around 130  Physical therapy and Occupational Therapy evaluation  Patient may need inpatient rehabilitation unit  High complexity patient            Please note a portion of this chart was generated using dragon dictation software. Although every effort was made to ensure the accuracy of this automated transcription, some errors in transcription may have occurred.          Bere Paez M.D.

## 2020-10-16 NOTE — PROGRESS NOTES
Introduced self to patient. Initial shift assessment completed, see complex assessment in doc flowsheet. Pt alert, disoriented to person, place, situation and time. Follows commands, answers yes/no questions. Cardene gtt infusing for -160. Call light within reach. Bed in low position with wheels locked. Encouraged to call for nurse as needed throughout the shift.

## 2020-10-16 NOTE — PROGRESS NOTES
aspiration (ongoing 10/16/2020)   2.) If clinical symptoms of penetration/aspiration continue to be noted,Pt will tolerate MBS to r/o aspiration and determine appropriate diet/liquid level. (ongoing 10/16/2020)   3.) Pt will tolerate diet advance to least restricted diet, as clinically indicated, with no signs of aspiration (ongoing 10/16/2020)   4.) Pt will improve oral motor function via bolus control exercises 5/5 (ongoing 10/16/2020)     Speech Language Treatment:  Impressions: Pt alert and sitting upright in bed, his daughter was present during session. Pt with perseveration and naming deficits noted. Speech Language STG, addressed this date: 1. Pt will improve basic naming skills (automatics, confrontational naming, responsive naming) to 80% given min cues (ongoing 10/16/2020)    -Able to count 1-10   -Days of week: unable to complete despite verbal cues and use of carrier phrase   -Picture namin/4 Pt with perseveration on \"28\" (suspect visual deficits were contributing as Pt was noted to hold picture close up and move it around to focus)   -Able to state his first and last name, unable to state his wife and daughters name   -Stating opposites: 3/5    2. Pt will improve auditory processing/comprehension of commands and questions to 80%, via graded tasks (ongoing 10/16/2020)    -One step commands: 0/2 given max cues   -Basic yes/no questions: 4/6   -Basic wh-questions: absent or perseverative responses     3. Pt will improve verbal expression of personal hx to 80% given min cues (ongoing 10/16/2020)    -Unable to state his birthday or family member names    4. Pt will participate in further assessment of speech-language/cognition as able with goals to be added as clinically indicated (ongoing 10/16/2020)    -Suspect visual deficits and significant right in-attention/neglect   -Oriented to place via yes/no questions     Patient/Family Education:Education given to the Pt and nurse, who verbalized understanding    Assessment: Patient progressing toward goals    Plan: Continue as per plan of care    Discharge Recommendations: Pt will benefit from continued skilled Speech Therapy for Speech and Dysphagia services, prior to returning home. Treatment time  Timed Code Treatment Minutes: 10 minutes  Total Treatment time: 40 minutes     If patient discharges prior to next session this note will serve as a discharge summary.       Lennox Rajan M.A., [address] [place] [address]  Speech-Language Pathologist

## 2020-10-16 NOTE — CONSULTS
includes Heart Disease in his father, maternal grandfather, and paternal grandfather; High Blood Pressure in his father; High Cholesterol in his father. REVIEW OF SYSTEMS:   Unable to obtain reliably due to aphasia    Physical Examination:  Vitals:   Patient Vitals for the past 24 hrs:   BP Temp Temp src Pulse Resp SpO2 Height   10/16/20 1245 -- -- -- -- -- -- 6' (1.829 m)   10/16/20 1019 (!) 155/80 -- -- 79 -- -- --   10/16/20 0819 -- -- -- 75 -- -- --   10/16/20 0800 (!) 126/92 98.3 °F (36.8 °C) Temporal 79 21 96 % --   10/16/20 0700 (!) 143/71 -- -- 76 22 98 % --   10/16/20 0600 128/60 -- -- 75 20 93 % --   10/16/20 0500 (!) 143/58 -- -- 79 19 93 % --   10/16/20 0400 122/70 -- -- 79 19 93 % --   10/16/20 0300 139/64 -- -- 81 19 92 % --   10/16/20 0245 136/68 -- -- 81 18 93 % --   10/16/20 0130 (!) 139/53 -- -- 84 16 -- --   10/16/20 0100 (!) 150/56 -- -- 83 15 91 % --   10/16/20 0000 (!) 169/66 98.1 °F (36.7 °C) Temporal 78 28 94 % --   10/15/20 2300 (!) 154/66 -- -- 75 17 -- --   10/15/20 2239 (!) 156/62 -- -- 77 21 -- --   10/15/20 2230 (!) 162/68 -- -- 81 8 -- --   10/15/20 2200 (!) 165/75 -- -- 75 17 -- --   10/15/20 2130 (!) 153/63 -- -- 78 18 -- --   10/15/20 2100 (!) 153/63 -- -- 81 18 -- --   10/15/20 2030 (!) 136/58 -- -- 78 17 -- --   10/15/20 2000 (!) 143/68 98.3 °F (36.8 °C) Temporal 79 16 -- --   10/15/20 1930 (!) 152/64 -- -- -- -- -- --   10/15/20 1900 (!) 151/83 -- -- 80 15 -- --   10/15/20 1830 (!) 158/69 -- -- 73 16 -- --   10/15/20 1800 (!) 148/66 -- -- 71 18 -- --   10/15/20 1730 (!) 173/79 -- -- 82 15 -- --   10/15/20 1700 (!) 176/79 -- -- 80 18 -- --   10/15/20 1630 (!) 166/81 -- -- 83 17 -- --   10/15/20 1600 (!) 173/84 99.1 °F (37.3 °C) Temporal 81 14 -- --     Psych: Stable mood, normal affect. Const: No distress  Eyes: Conjunctiva noninjected, no icterus noted; pupils equal, round. HENT: Atraumatic, normocephalic; Oral mucosa moist  Neck: Trachea midline, neck supple.  No the internal carotid arteries measured    using NASCET criteria. Dose modulation, iterative reconstruction, and/or    weight based adjustment of the mA/kV was utilized to reduce the radiation    dose to as low as reasonably achievable.         COMPARISON:    CT head October 13, 2020, MRI brain November 14, 2017         HISTORY:    ORDERING SYSTEM PROVIDED HISTORY: stroke    TECHNOLOGIST PROVIDED HISTORY:    Reason for exam:->stroke    Reason for Exam: stroke    Acuity: Acute    Type of Exam: Initial    Relevant Medical/Surgical History:  Altered Mental Status (pt brought to ed by    [facility] after wife found him on floor, unsure how long pt confused    unable to follow directions and answer questions correctly )         FINDINGS:         CTA NECK:         AORTIC ARCH/ARCH VESSELS: No dissection or arterial injury.  No significant    stenosis of the brachiocephalic or subclavian arteries.         CAROTID ARTERIES: There is 70% stenosis at the origin of the right common    carotid artery.  There is 50% stenosis at the origin of the right internal    carotid artery.  There is 30% stenosis at the origin of the left internal    carotid artery.         VERTEBRAL ARTERIES: There is stenosis along the right cervical vertebral    artery, most severe at the distal V1 segment with up to 70% stenosis.  No    acute abnormality or flow stenosis in the cervical portion of the left    vertebral artery.         SOFT TISSUES: There is moderate emphysema.  No cervical or superior    mediastinal lymphadenopathy.  The larynx and pharynx are unremarkable.  No    acute abnormality of the salivary glands.  There is 1 cm left thyroid nodule,    does not require follow-up due to small size.         BONES: No acute osseous abnormality.              CTA HEAD:         ANTERIOR CIRCULATION: There is 75% stenosis at the origin of an M2 branch of    the right MCA.  No significant stenosis of the intracranial internal carotid,    anterior cerebral, or left middle cerebral arteries. No aneurysm.         POSTERIOR CIRCULATION: There is partial occlusion in the mid to distal V4    segment of the right vertebral artery.  There is up to 40% stenosis in mid V4    segment of the left vertebral artery.  There is partial occlusion in the P2    segment of the left PCA.  No significant stenosis of the basilar, or right    posterior cerebral arteries. No aneurysm.         OTHER: No dural venous sinus thrombosis on this non-dedicated study.         BRAIN: There is a 7 mm focus of hyperattenuation in the right parietal    periventricular white matter, likely corresponding to a cavernoma, stable in    size since MRI brain November 14, 2017.  There is mild parenchymal volume    loss. Donna Scrivener is periventricular white matter low attenuation, likely related    to mild to moderate chronic microvascular disease.  There is mild    hydrocephalus, stable.  No mass effect or midline shift. No extra-axial fluid    collection. The gray-white differentiation is maintained.  There is mild    mucosal thickening in the paranasal sinuses.  There are moderate bilateral    mastoid effusions.              Impression    Partial occlusion in the mid to distal V4 segment of the right vertebral    artery. There is up to 40% stenosis in mid V4 segment of the left vertebral    artery.         Partial occlusion in the P2 segment of the left PCA.      75% stenosis at the origin of the a M2 branch of the right MCA.         70% stenosis at the origin of the right common carotid artery.         50% stenosis at the origin of the right internal carotid artery.         30% stenosis at the origin of the left internal carotid artery.         70% severe at the distal V1 segment of the right vertebral artery.       EXAMINATION:    CT OF THE HEAD WITHOUT CONTRAST  10/13/2020 6:52 am         TECHNIQUE:    CT of the head was performed without the administration of intravenous    contrast. Dose modulation, iterative reconstruction, and/or weight based    adjustment of the mA/kV was utilized to reduce the radiation dose to as low    as reasonably achievable.         COMPARISON:    None.         HISTORY:    ORDERING SYSTEM PROVIDED HISTORY: stroke    TECHNOLOGIST PROVIDED HISTORY:    Has a \"code stroke\" or \"stroke alert\" been called? ->Yes    Reason for exam:->stroke    Reason for Exam: stroke    Acuity: Acute    Type of Exam: Initial    Relevant Medical/Surgical History: Altered Mental Status (pt brought to ed by    [facility] after wife found him on floor, unsure how long pt confused    unable to follow directions and answer questions correctly )         FINDINGS:    BRAIN/VENTRICLES: Ventricles are midline in position and mildly enlarged in    size.  There is moderate periventricular hypodensity seen.  Patchy and    confluent hypodensity is seen scattered throughout the frontal and parietal    white matter.         Posteriorly in the periventricular region on the right there is a nodular    area of increased density measuring 7 mm.  This appears somewhat tubular in    character.         There is a bulbous appearance to the middle cerebral artery distally on the    right.  There is intracranial atherosclerosis. .         ORBITS: The visualized portion of the orbits demonstrate no acute abnormality.         SINUSES: Fluid is seen in the mastoid air cells bilaterally.  Mild mucosal    thickening is seen in the maxillary sinuses.  There is streak artifact from    dental amalgam.         SOFT TISSUES/SKULL:  No acute abnormality of the visualized skull or soft    tissues.              Impression    Posteriorly in the periventricular region on the right there is a nodular    area of increased density measuring 7 mm.  This appears somewhat tubular in    character. .  This could represent a small a cavernoma or venous angioma.     However a small area of hemorrhage could also have this appearance.         There is

## 2020-10-16 NOTE — PROGRESS NOTES
Nutrition Assessment     Type and Reason for Visit: Reassess    Nutrition Recommendations/Plan:   1. Begin Glucerna BID  2. Encourage PO intake  3. Record meal and supplement intakes in flowsheet     Nutrition Assessment:  Pt is at risk for nutrition compromise AEB poor PO intake. Family states pt ate minimal amount for breakfast today. SLP determined pt needs dysphagia soft and bite-sized diet with thin liquids. Pt agreed to glucerna BID to supplement meals. Malnutrition Assessment:  Malnutrition Status: No malnutrition    Estimated Daily Nutrient Needs:  Energy (kcal): 8244-4905 kcal (25-28 kcal/kg); Weight Used for Energy Requirements:  Current(cont to use prev wt)     Protein (g): 82-98g (1-1.2 g/kg); Weight Used for Protein Requirements:  Current        Fluid (ml/day): 1 ml/kcal; Weight Used for Fluid Requirements:  Current      Nutrition Related Findings: No edema noted. Glucose 197      Current Nutrition Therapies:    DIET DYSPHAGIA SOFT AND BITE-SIZED; No Drinking Straw    Anthropometric Measures:  · Height: 6' (182.9 cm)  · Current Body Wt: 176 lb (79.8 kg)   · BMI: 23.9    Nutrition Diagnosis:   · Predicted inadequate energy intake related to acute injury/trauma as evidenced by poor intake prior to admission      Nutrition Interventions:   Food and/or Nutrient Delivery:  Continue Current Diet, Start Oral Nutrition Supplement  Nutrition Education/Counseling:  Education not indicated   Coordination of Nutrition Care:  Continued Inpatient Monitoring    Goals:  Consume >50% of meals and ONS       Nutrition Monitoring and Evaluation:   Food/Nutrient Intake Outcomes:  Food and Nutrient Intake, Supplement Intake  Physical Signs/Symptoms Outcomes:  Biochemical Data, Chewing or Swallowing, Weight     Discharge Planning:     Too soon to determine     Electronically signed by Devin West RD, LD on 10/16/20 at 12:55 PM EDT    Contact: [phone]

## 2020-10-16 NOTE — PLAN OF CARE
Nutrition Problem #1: Predicted inadequate energy intake  Intervention: Food and/or Nutrient Delivery: Continue Current Diet, Start Oral Nutrition Supplement  Nutritional Goals: Consume >50% of meals and ONS

## 2020-10-16 NOTE — PROGRESS NOTES
Occupational Therapy  Facility/Department: [facility]  Daily Treatment Note  NAME: Isabelle Fonseca  : 1945  MRN: [id number]    Date of Service: 10/16/2020    Discharge Recommendations:Jose Maria Rojas scored a  on the AM-PAC ADL Inpatient form. Current research shows that an AM-PAC score of 17 or less is typically not associated with a discharge to the patient's home setting. Based on the patient's AM-PAC score and their current ADL deficits, it is recommended that the patient have 5-7 sessions per week of Occupational Therapy at d/c to increase the patient's independence. At this time, this patient demonstrates the endurance, and/or tolerance for 3 hours of therapy each day, with a treatment frequency of 5-7x/wk. Please see assessment section for further patient specific details. If patient discharges prior to next session this note will serve as a discharge summary. Please see below for the latest assessment towards goals. OT Equipment Recommendations  Equipment Needed: No  Other: continue to assess    Assessment   Performance deficits / Impairments: Decreased functional mobility ; Decreased balance;Decreased ADL status; Decreased endurance;Decreased high-level IADLs;Decreased cognition;Decreased vision/visual deficit; Decreased coordination;Decreased strength;Decreased sensation;Decreased fine motor control  Assessment: Patient presents well below baseline d/t above deficits; OT indicated to maximize safety/independence with ADL and IADL  Treatment Diagnosis: Above deficits associated with CVA  Prognosis: Good  Decision Making: High Complexity  OT Education: OT Role;Plan of Care  Patient Education: eval, discharge - wife verbalizes understanding, pt will require reinforcement  REQUIRES OT FOLLOW UP: Yes  Activity Tolerance  Activity Tolerance: Patient limited by fatigue;Treatment limited secondary to decreased cognition  Safety Devices  Safety Devices in place: Yes  Type of devices:  All fall risk precautions in place;Call light within reach; Chair alarm in place; Left in chair;Patient at risk for falls;Gait belt;Nurse notified(daughter in room)         Patient Diagnosis(es): The primary encounter diagnosis was Disorientation. Diagnoses of Secondary hypertension, Chest pain, unspecified type, and Cerebral infarction due to bilateral stenosis of vertebral arteries ([facility] [address] were also pertinent to this visit. has a past medical history of Acute MI ([facility] [address] CAD (coronary artery disease), Hyperlipidemia, Hypertension, and Type II or unspecified type diabetes mellitus without mention of complication, not stated as uncontrolled. has a past surgical history that includes Coronary angioplasty with stent (2000, 2/24/2012); Hand surgery (5/09); ESOPHAGOSCOPY; back surgery (2018); Hand surgery (Left, 2009); and Cardiac surgery. Restrictions  Restrictions/Precautions  Restrictions/Precautions: Fall Risk, Modified Diet(high fall risk, dys soft and bite sized)  Required Braces or Orthoses?: No  Position Activity Restriction  Other position/activity restrictions: Iram Lopez is a 76 y.o. male who presents to the emergency department fall. This morning wife noted that he was on the ground and she called EMS because she was unable to get them up. She states that over the last 48 hours he has been feeling unwell. She did not notice any significant weakness or confusion but states that he has been slower than usual over the last 48 hours. MRI showed \"Large acute left PCA vascular territory infarct\"  Subjective   General  Chart Reviewed: Yes  Additional Pertinent Hx: HTN  Family / Caregiver Present: Janit Friday, daughter Izzy Frias)  Diagnosis: CVA  Subjective  Subjective: Patient in bed, unable to state any orientation due to aphasia. States, \"Yes, No,\" to questions. States, \"I don't understand. \"  When asked to look for OT eyes states, \"Where? \"  Vital Signs  Patient Currently in Pain: No attention to R visual field. Automatically attend to L with sound or movement.)  Safety Judgement: Decreased awareness of need for safety;Decreased awareness of need for assistance(Forward flexed posture at EOB, no righting reaction to correct, leans R without awareness.)  Problem Solving: Decreased awareness of errors  Insights: Not aware of deficits  Initiation: Requires cues for all  Sequencing: Requires cues for all  Cognition Comment: Easily frustrated with verbalization.      Perception  Overall Perceptual Status: Impaired  Unilateral Attention: Cues to attend right visual field;Cues to maintain midline in sitting;Cues to attend to right side of body;Cues to maintain midline in standing  Initiation: Hand over hand to initiate tasks  Motor Planning: Hand over hand to sequence tasks           Upper Extremity Function  NDT Treatment: Sitting(Weight bearing on R UE with assist to maintain hand position cues for elbow extension)     LUE AROM (degrees)  LUE AROM : WFL  RUE PROM (degrees)  RUE PROM: WFL  RUE AROM (degrees)  RUE General AROM: No active movement noted when prompted - increasing tone in flexor synergy this date                 Plan   Plan  Times per week: 5-7  Times per day: Daily  Current Treatment Recommendations: Strengthening, Endurance Training, Balance Training, Functional Mobility Training, Safety Education & Training, Equipment Evaluation, Education, & procurement, Self-Care / ADL, Patient/Caregiver Education & Training  AM-PAC Score        AM-[facility] Inpatient Daily Activity Raw Score: 9 (10/16/20 1233)  AM-PAC Inpatient ADL T-Scale Score : 25.33 (10/16/20 1233)  ADL Inpatient CMS 0-100% Score: 79.59 (10/16/20 1233)  ADL Inpatient CMS G-Code Modifier : CL (10/16/20 1233)    Goals  Short term goals  Time Frame for Short term goals: discharge  Short term goal 1: UB ADL mod-ongoing 10/16  Short term goal 2: LB ADL mod A-ongoing 10/16  Short term goal 3: Fxl transfer max A-Max of 2  Short term goal 4: Increase sitting balance to SBA for fxl task, ADL-CGA to Mod A 10/16  Short term goal 5: Self-feeding min A-ongoing 10/16  Short term goal 6: Grooming mod A-Max A 10/16  Long term goals  Time Frame for Long term goals : LTG=STG       Therapy Time   Individual Concurrent Group Co-treatment   Time In       1035   Time Out       1117   Minutes       42        Timed Code Treatment Minutes:  42 Minutes    Total Treatment Minutes:  [address], OT   [address] [facility]  Ani Garcia [address]

## 2020-10-17 NOTE — PROGRESS NOTES
Renal Note    Notes and labs reviewed    Consult received    EDIL in setting of SBP from 200's to 100's + ARB. CTA done 10/13  UA bland  Recent CVA  Agree with holding ARB  NSS trial    Thank you.

## 2020-10-17 NOTE — ACP (ADVANCE CARE PLANNING)
Advanced Care Planning Note. Purpose of Encounter: Advanced care planning in light of acute and chronic deteriorating medical conditions. Parties In Attendance: Patient (Zenobia Kumar),  [place]  (POA), Dr. German Welch (myself)    Decisional CapacityNO    Subjective: Patient/family understand in this voluntary conversation that Zenobia Kumar continues to deteriorate and discuss what inteventions and plans patient would want implemented in light of the following diagnoses:  cva    Objective: Pt has been having chronic decline in functional status with increased dependence on others for ADLs  Increased frequency of Hospitalizations. Likelihood of further hospitalizations with likelihood of escalation of medical interventions with the expectation of returning to a diminishing baseline level of functionality. Discussion highlights: I discussed with patient the ramifications of their acute and chronic medical problems- and the likely outcomes expected, both in terms of statistics, and as part of my experience seeing patients with similar conditions. We did discuss the meanings of the different possible code statuses- and we inquired which of these seem to line up with the patients current and previously expressed values. Goals of Care Determination:  Patient wishes to remain Full code for now, but has interest in continuing this conversation, and discussing with family before making any changes to code status and goals of care parameters. Plan: Continue to educate patient on medical conditions and the choices available regarding possible outcomes with regard to goals of care and code status.          Time spent on Advanced care Plannin minutes    German Welch MD  10/17/2020 1:21 PM

## 2020-10-17 NOTE — PROGRESS NOTES
Pt awakens easily to name called, difficulty keeping eye contact, at times verbally abusive & uncooperative. Occasionally will lightly squeeze to command with L hand, R side flaccid. VSS  Monitor NSR  Lungs clear BUL's, diminished BLL's.  RA sat high 90's. Condom cath in place draining occasional yellow urine. Appetite has been mostly poor. Got up to chair with help from PT/OT. Medicated with Norco 2 tabs x1 for c/o back pain. See flow sheets for complete assessments. Family members have been at bedside all day today.

## 2020-10-17 NOTE — PROGRESS NOTES
NEUROLOGY FOLLOWUP    HISTORY OF PRESENT ILLNESS :   Patient was seen earlier this afternoon for follow-up  Ene Ramirez is a 76 y.o. male   History was obtained from the dictations in the chart. Patient has significant expressive aphasia and is not able to give any history. Additional history was obtained from the patient's wife. Patient continues to slowly improve. His blood pressure is better controlled and there is systolic blood pressure now in the 130s. Patient continues to have visual field defect on the right side. Patient also has significant aphasia and speech difficulty as well as right hemiplegia. Detailed history:  Apparently patient has severe hypertension but has not been taking his medications for more than 6 months. He developed some confusion 2 days ago. Later he developed right-sided weakness and had speech difficulty. He was unable to talk and had difficulty with comprehension as well. Onset of symptoms was fairly acute and abrupt and moderately severe. No clear aggravating or relieving factors.   Patient has never had any similar symptoms in the past.  Comorbidities include coronary artery disease hypertension hyperlipidemia and type 2 diabetes    REVIEW OF SYSTEMS       Constitutional:  []   Chills   [x]  Fatigue   []  Fevers   []  Malaise   []  Weight loss     [] Denies all of the above    Respiratory:   []  Cough    []  Shortness of breath         [x] Denies all of the above     Cardiovascular:   []  Chest pain    []  Exertional chest pressure/discomfort           [] Palpitations    []  Syncope     [x] Denies all of the above    Past Medical History:   Diagnosis Date    Acute MI ([facility] [address] 2000    CAD (coronary artery disease)     Hyperlipidemia     Hypertension     Type II or unspecified type diabetes mellitus without mention of complication, not stated as uncontrolled     diet controlled     Family History   Problem Relation Age of Onset    Heart Disease Father     High Blood Pressure Father     High Cholesterol Father     Heart Disease Maternal Grandfather     Heart Disease Paternal Grandfather      Social History     Socioeconomic History    Marital status:      Spouse name: None    Number of children: None    Years of education: None    Highest education level: None   Occupational History    None   Social Needs    Financial resource strain: None    Food insecurity     Worry: None     Inability: None    Transportation needs     Medical: None     Non-medical: None   Tobacco Use    Smoking status: Former Smoker     Packs/day: 0.00     Years: 20.00     Pack years: 0.00     Last attempt to quit: 1982     Years since quittin.6    Smokeless tobacco: Never Used   Substance and Sexual Activity    Alcohol use: Yes     Alcohol/week: 6.0 standard drinks     Types: 6 Cans of beer per week     Comment: Occ    Drug use: No    Sexual activity: None   Lifestyle    Physical activity     Days per week: None     Minutes per session: None    Stress: None   Relationships    Social connections     Talks on phone: None     Gets together: None     Attends Mosque service: None     Active member of club or organization: None     Attends meetings of clubs or organizations: None     Relationship status: None    Intimate partner violence     Fear of current or ex partner: None     Emotionally abused: None     Physically abused: None     Forced sexual activity: None   Other Topics Concern    None   Social History Narrative    None        PHYSICAL EXAMINATION      /79   Pulse 64   Temp 97.2 °F (36.2 °C) (Temporal)   Resp 21   Ht 6' (1.829 m)   Wt 177 lb (80.3 kg)   SpO2 97%   BMI 24.01 kg/m²   This is a well-nourished patient in no acute distress  Patient is awake and alert.   He has significant expressive aphasia as well as some comprehension difficulties and Abnormal transaminases    Diverticulitis    Unstable angina (HCC)    Midline thoracic back pain    Mixed hyperlipidemia    Bilateral carotid artery stenosis    Acute CVA (cerebrovascular accident) ([facility] [address]       RECOMMENDATIONS :  Discussed with patient and his wife  Aspirin   Statin therapy  Agree with trying to keep the systolic blood pressure around 130  Physical therapy and Occupational Therapy evaluation  Patient may need inpatient rehabilitation unit  High complexity patient            Please note a portion of this chart was generated using dragon dictation software. Although every effort was made to ensure the accuracy of this automated transcription, some errors in transcription may have occurred.          Kiran Hernadez M.D.

## 2020-10-17 NOTE — PROGRESS NOTES
(patient unable to verbalize understanding due to aphasia)  Barriers to Learning: language, physical  REQUIRES PT FOLLOW UP: Yes  Activity Tolerance  Activity Tolerance: Patient Tolerated treatment well;Patient limited by fatigue     Patient Diagnosis(es): The primary encounter diagnosis was Disorientation. Diagnoses of Secondary hypertension, Chest pain, unspecified type, and Cerebral infarction due to bilateral stenosis of vertebral arteries ([facility] [address] were also pertinent to this visit. has a past medical history of Acute MI ([address] CAD (coronary artery disease), Hyperlipidemia, Hypertension, and Type II or unspecified type diabetes mellitus without mention of complication, not stated as uncontrolled. has a past surgical history that includes Coronary angioplasty with stent (2000, 2/24/2012); Hand surgery (5/09); ESOPHAGOSCOPY; back surgery (2018); Hand surgery (Left, 2009); and Cardiac surgery. Restrictions  Restrictions/Precautions  Restrictions/Precautions: Fall Risk, Modified Diet(high fall risk, dys soft and bite sized, thin liquids)  Required Braces or Orthoses?: No  Position Activity Restriction  Other position/activity restrictions: Leroy Mancia is a 76 y.o. male who presents to the emergency department fall. This morning wife noted that he was on the ground and she called EMS because she was unable to get them up. She states that over the last 48 hours he has been feeling unwell. She did not notice any significant weakness or confusion but states that he has been slower than usual over the last 48 hours. MRI showed \"Large acute left PCA vascular territory infarct\"  Subjective   General  Chart Reviewed: Yes  Response To Previous Treatment: Patient with no complaints from previous session. Family / Caregiver Present: Yes(daughter, Zoltan Rios)  Subjective  Subjective: Pt denies pain;  Pt with limited communiation; can state \"yes\" and \"no\"; did follow minimal simple commands  General Comment  Comments: supine in bed upon arrival with bed alarm on, leaning to L  Pain Screening  Patient Currently in Pain: Yes  Pain Assessment  Pain Assessment: 0-10  Pain Level: 5  Vital Signs  Patient Currently in Pain: Yes       Orientation  Orientation  Overall Orientation Status: Impaired  Orientation Level: Oriented to person(unable to state; responds to name)  Cognition      Objective   Bed mobility  Rolling to Left: Dependent/Total;2 Person assistance(max A of 2)  Supine to Sit: Dependent/Total;2 Person assistance(max-Dep of 2)  Scooting: Dependent/Total  Transfers  Sit to Stand: Dependent/Total;2 Person Assistance(Max A x2)  Stand to sit: 2 Person Assistance;Dependent/Total(max A of 2)  Bed to Chair: Dependent/Total;2 Person Assistance(Max A x2)  Comment: pt stood from bed for transfer to chair; stood from chair 1x, tactile and verbal cues; min-no R quad activation palpated in stance during brief stand  Ambulation  Ambulation?: No  Stairs/Curb  Stairs?: No     Balance  Posture: Fair(forward flexed posture with increased kyphosis)  Sitting - Static: Fair(Heavy R anterolateral lean)  Sitting - Dynamic: Poor  Standing - Static: Poor  Standing - Dynamic: Poor            Comment: Once in chair, PT completing joint compression to RLE. PT also completing PROM to RLE x10 reps for knee extension and hip flexion              G-Code     OutComes Score                                                     AM-PAC Score  AM-PAC Inpatient Mobility Raw Score : 7 (10/17/20 1241)  AM-PAC Inpatient T-Scale Score : 26.42 (10/17/20 1241)  Mobility Inpatient CMS 0-100% Score: 92.36 (10/17/20 1241)  Mobility Inpatient CMS G-Code Modifier : CM (10/17/20 1241)          Goals  Short term goals  Time Frame for Short term goals:  To be met prior to discharge  Short term goal 1: Bed mobility with max A  Short term goal 2: Sit EOB 3-5 minutes with SBA  Short term goal 3: Sit to/from stand with max A  Short term goal 4: Bed to/from chair with max A  Patient Goals   Patient goals : unable to state    Plan    Plan  Times per week: 5-7  Times per day: Daily  Current Treatment Recommendations: Strengthening, ROM, Balance Training, Transfer Training, Gait Training, Safety Education & Training, Patient/Caregiver Education & Training  Safety Devices  Type of devices:  All fall risk precautions in place, Call light within reach, Chair alarm in place, Gait belt, Patient at risk for falls, Left in chair, Nurse notified  Restraints  Initially in place: No     Therapy Time   Individual Concurrent Group Co-treatment   Time In       0845   Time Out       0930   Minutes       45   Timed Code Treatment Minutes: [address], PT   Romie Bateman, PT, DPT, [id number]

## 2020-10-17 NOTE — PLAN OF CARE
Problem: Falls - Risk of:  Goal: Will remain free from falls  Outcome: Ongoing  Fall risk protocol in place  Monika Ching fall risk assessed q shift    Problem: Confusion - Acute:  Goal: Absence of continued neurological deterioration signs and symptoms  Outcome: Ongoing  Assess mental status Q4hrs & prn  Reorient prn    Problem: Cardiovascular  Goal: Hemodynamic stability  Outcome: Ongoing  Continuous cardiac monitoring  VS Qhr  Labatolol prn

## 2020-10-17 NOTE — PROGRESS NOTES
Hospitalist Progress Note      PCP: Yunier Velasquez    Date of Admission: 10/13/2020    Chief Complaint: Confusion and right-sided weakness    Hospital Course: This morning patient awake but not very conversant  Is not able to give me his name  Overnight seems like he was pretty confused  Requires a lot of encouragement to eat  Blood pressure in 531 systolic  Off nicardipine drip  Denies any chest pain or shortness of breath  Daughter at bedside      Medications:  Reviewed      Exam:    /79   Pulse 64   Temp 97.2 °F (36.2 °C) (Temporal)   Resp 21   Ht 6' (1.829 m)   Wt 177 lb (80.3 kg)   SpO2 97%   BMI 24.01 kg/m²     General appearance: Drowsy opens eyes to commands  HEENT: Pupils equal, round, and reactive to light. Conjunctivae/corneas clear. Neck: Supple, with full range of motion. No jugular venous distention. Trachea midline. Respiratory:  Normal respiratory effort. Clear to auscultation, bilaterally without RALES/WHEEZES/Rhonchi. Cardiovascular: Regular rate and rhythm with normal S1/S2 without MURMURS, rubs or gallops. Abdomen: Soft, non-tender, non-distended with normal bowel sounds. Musculoskeletal: No clubbing, cyanosis or EDEMA bilaterally. Full range of motion without deformity. Skin: Skin color, texture, turgor normal.  No rashes or lesions.   Neurologic: Unable to do visual field testing  Right hemiplegia power is 1/5 on the right side  Some dysarthria  receptive aphasia  Awake alert   Unable to answer any of my orientation questions  Still has some significant right-sided neglect        Labs:   Recent Labs     10/15/20  1227 10/16/20  0514 10/17/20  0516   WBC  --  13.9* 8.9   HGB 16.3 16.3 13.6   HCT  --  46.6 39.4*   PLT  --  179 133*     Recent Labs     10/16/20  0514 10/17/20  0516    138   K 3.7 3.5    104   CO2 21 23   BUN 26* 39*   CREATININE 1.3 1.6*   CALCIUM 8.9 8.7     Recent Labs     10/16/20  0514   AST 13*   ALT 14   BILITOT 2.6*   ALKPHOS 99 No results for input(s): INR in the last 72 hours. Recent Labs     10/14/20  1714 10/14/20  2313 10/15/20  0529   TROPONINI 0.05* 0.03* 0.03*       Urinalysis:      Lab Results   Component Value Date    NITRU Negative 10/17/2020    WBCUA 1 10/17/2020    BACTERIA Rare 02/28/2015    RBCUA 3 10/17/2020    BLOODU Negative 10/17/2020    SPECGRAV 1.024 10/17/2020    GLUCOSEU Negative 10/17/2020       Radiology:  XR ABDOMEN (KUB) (SINGLE AP VIEW)   Final Result   1. Nonobstructive bowel gas pattern. Mild prominence of small bowel loops   may represent mild ileus in the appropriate clinical setting. 2.  Suspect splenomegaly. CT HEAD WO CONTRAST   Final Result   Aging or evolving of known left PCA vascular territory infarct. Large area   of hypodensity is now seen in this region. No hemorrhage noted. Underlying atrophy with periventricular and at additional areas of small   vessel ischemic change      Small cavernoma posterior right parietal lobe         MRI BRAIN WO CONTRAST   Final Result   Large acute left PCA vascular territory infarct. The findings were sent to the Radiology Results [address] at 7:07   pm on 10/13/2020to be communicated to a licensed caregiver. XR CHEST (2 VW)   Final Result   1. Superior mediastinal widening, likely due to prominent vasculature, given   appearance on CT angiography of the neck performed just prior to this. 2.  Hazy opacity at the right lung base with volume loss in the right   hemithorax, suggesting atelectasis. CTA HEAD NECK W CONTRAST   Final Result   Partial occlusion in the mid to distal V4 segment of the right vertebral   artery. There is up to 40% stenosis in mid V4 segment of the left vertebral   artery. Partial occlusion in the P2 segment of the left PCA. 75% stenosis at the origin of the a M2 branch of the right MCA. 70% stenosis at the origin of the right common carotid artery.       50% stenosis at the origin of the right internal carotid artery. 30% stenosis at the origin of the left internal carotid artery. 70% severe at the distal V1 segment of the right vertebral artery. Results were reported to Dr. Danielle Rodriguez at 4:07 p.m. on October 13, 2020. CT HEAD WO CONTRAST   Final Result   Posteriorly in the periventricular region on the right there is a nodular   area of increased density measuring 7 mm. This appears somewhat tubular in   character. .  This could represent a small a cavernoma or venous angioma. However a small area of hemorrhage could also have this appearance. There is a bulbous appearance to the middle cerebral artery distally on the   right, either due to tortuous vessel or small aneurysm. Atrophy and small-vessel ischemic change      Results discussed with [facility] by Arnie Tobar.  Myra Gallagher MD at 7:06 am on   10/13/2020             Assessment/Plan:    Active Hospital Problems    Diagnosis Date Noted    Acute CVA [facility]cerebrovascular accident) [facility]) [I63.9] 10/13/2020       Acute Medical Issues Being Addressed:    40-year-old gentleman admitted to the hospital with acute right hemiparesis and acute encephalopathy secondary to left sided infarct  MRI showed infarct in the posterior circulation  CT angiogram of the head and neck showed multiple intracranial stenosis  On aspirin and Plavix  A1c 6.4  Echoardiogram normal ejection fraction no wall motion abnormalities no PFO  Will need 30-day Holter monitor  Continue telemetry  CT head was stable      Nausea vomiting diarrhea  LFTs are normal  Abdominal examination is benign  KUB normal   Resolved    Acute kidney injury strongly suspect secondary to the blood pressure fluctuations  For now we will hold valsartan  Nephrology consulted      Delirium   multifactorial   delirium precautions    Hypertension malignant with endorgan damage  He is on labetalol 200 mg 3 times a day,valsartan and amlodipine 10  On a dysphagia diet  I have held the valsartan today given the rise in his creatinine  Start on hydralazine 25 3 times daily      Elevated troponin  EKG shows some new T inversions in V3 and V4 as compared to previous EKG  He does not have any chest pain  Troponins more or less flat  Seen by cardiology  On aspirin and Plavix  On Lipitor full dose    Coronary  artery disease  On aspirin and Plavix    Prediabetes  Start Metformin on discharge      L superior mediastinal widening on chest x-ray  His radial pulses are equal more importantly on the CT angiogram of the neck the chest x-ray report acknowledges that that area was included on the CT angiogram and was visualized and showed only prominent vasculature ends at this time will not pursue any further testing     All of the above discussed at length with the daughter who voiced understanding and is in agreement with this plan    Seen by physical medicine and rehab for ARU placement if blood pressure remained stable over the next 24 hours can be discharged on Monday    DVT Prophylaxis: sc lovenox   Diet: DIET DYSPHAGIA SOFT AND BITE-SIZED;  No Drinking Straw  Dietary Nutrition Supplements: Diabetic Oral Supplement  Code Status: Full Code      Dispo - once acute medical processes have resolved    Shashank Darby MD

## 2020-10-17 NOTE — PROGRESS NOTES
place: Yes  Type of devices: All fall risk precautions in place;Call light within reach; Chair alarm in place; Left in chair;Patient at risk for falls;Gait belt;Nurse notified(Dtr in room)         Patient Diagnosis(es): The primary encounter diagnosis was Disorientation. Diagnoses of Secondary hypertension, Chest pain, unspecified type, and Cerebral infarction due to bilateral stenosis of vertebral arteries ([facility] [address] were also pertinent to this visit. has a past medical history of Acute MI ([facility] [address] CAD (coronary artery disease), Hyperlipidemia, Hypertension, and Type II or unspecified type diabetes mellitus without mention of complication, not stated as uncontrolled. has a past surgical history that includes Coronary angioplasty with stent (, 2012); Hand surgery (); ESOPHAGOSCOPY; back surgery (); Hand surgery (Left, ); and Cardiac surgery. Restrictions  Restrictions/Precautions  Restrictions/Precautions: Fall Risk, Modified Diet(high fall risk, dys soft and bite sized, thin liquids)  Required Braces or Orthoses?: No  Position Activity Restriction  Other position/activity restrictions: Maurice Velazquez is a 76 y.o. male who presents to the emergency department fall. This morning wife noted that he was on the ground and she called EMS because she was unable to get them up. She states that over the last 48 hours he has been feeling unwell. She did not notice any significant weakness or confusion but states that he has been slower than usual over the last 48 hours. MRI showed \"Large acute left PCA vascular territory infarct\"  Subjective   General  Chart Reviewed: Yes  Additional Pertinent Hx: HTN  Response to previous treatment: Patient unable to report, no changes reported from family or staff  Family / Caregiver Present: Yes(RN at end of session, Dtr June Greco)  Diagnosis: CVA  Subjective  Subjective: Patient supine in bed upon arrival, alert not oriented to name/.   Pre Treatment Pain Screening  Intervention List: Patient able to continue with treatment;Nurse/Physician notified  Vital Signs  Patient Currently in Pain: Yes   Orientation  Orientation  Overall Orientation Status: Impaired(States, \"Yes,\" when therapist offers name. Unable to state name, , place, time or situation on command. Identity verified through wrist bracelet.)  Objective    ADL  Feeding: Setup  Grooming: Dependent/Total([place] for face washing, attempted R UE with no success, able to use L UE after multiple [place]/tactile cues)        Balance  Sitting Balance: Maximum assistance(Right lateral lean, unable to stay in midline despite max cues)  Standing Balance: Dependent/Total  Standing Balance  Time: ~10 sec  Activity: Transfer  Bed mobility  Rolling to Left: Dependent/Total;2 Person assistance(max A of 2)  Supine to Sit: Dependent/Total;2 Person assistance(max-Dep of 2)  Scooting: Dependent/Total  Transfers  Stand Pivot Transfers: 2 Person assistance; Moderate assistance  Sit to stand: Maximum assistance;2 Person assistance(Mod of 2 from bed, Max of 2 from chair)  Stand to sit: Maximum assistance;2 Person assistance                 Neuromuscular Education  NDT Treatment: Sitting(Weight bearing on R UE with assist to maintain hand position cues for elbow extension EOB)     Cognition  Overall Cognitive Status: Exceptions  Arousal/Alertness: Inconsistent responses to stimuli; Appropriate responses to stimuli(Pt reports pain in R UE/LE with PROM)  Following Commands: Inconsistently follows commands(States \"not too easy\" after being asked to hook LLE under RUE)  Attention Span: Unable to maintain attention(Decreased overall attention during session, decreased R attention, unable to sustain attention to R visual field.   Automatically attend to L with sound or movement.)  Safety Judgement: Decreased awareness of need for safety;Decreased awareness of need for assistance(Forward flexed posture at EOB, no righting reaction to correct, leans R without awareness.)  Problem Solving: Decreased awareness of errors  Insights: Not aware of deficits  Initiation: Requires cues for all  Sequencing: Requires cues for all  Cognition Comment: Easily frustrated with verbalization. Perception  Overall Perceptual Status: Impaired  Unilateral Attention: Cues to attend right visual field;Cues to maintain midline in sitting;Cues to attend to right side of body;Cues to maintain midline in standing  Initiation: Hand over hand to initiate tasks  Motor Planning: Hand over hand to sequence tasks           Upper Extremity Function  UE PROM: RUE shoulder flexion, elbow flexion/extension, wrist/hand flexion/extension; No initiation patterns from patient, able to feel some muscle contraction with squeeze  NDT Treatment: Sitting(Weight bearing on R UE with assist to maintain hand position cues for elbow extension EOB)     LUE AROM (degrees)  LUE AROM : WFL  RUE PROM (degrees)  RUE PROM: WFL  RUE AROM (degrees)  RUE General AROM: No active movement noted when prompted - increasing tone in flexor synergy this date                 Plan   Plan  Times per week: 5-7  Times per day: Daily  Current Treatment Recommendations: Strengthening, Endurance Training, Balance Training, Functional Mobility Training, Safety Education & Training, Equipment Evaluation, Education, & procurement, Self-Care / ADL, Patient/Caregiver Education & Training  AM-PAC Score        AM-[facility] Inpatient Daily Activity Raw Score: 9 (10/17/20 0959)  AM-PAC Inpatient ADL T-Scale Score : 25.33 (10/17/20 0959)  ADL Inpatient CMS 0-100% Score: 79.59 (10/17/20 0959)  ADL Inpatient CMS G-Code Modifier : CL (10/17/20 0959)    Goals  Short term goals  Time Frame for Short term goals: discharge  Short term goal 1: UB ADL mod-ongoing 10/17  Short term goal 2: LB ADL mod A-ongoing 10/17  Short term goal 3: Fxl transfer max A-Max of 2 10/17  Short term goal 4:  Increase sitting balance to SBA for fxl task, ADL-Max A 10/17  Short term goal 5: Self-feeding min A-ongoing 10/17  Short term goal 6: Grooming mod A-Max A 10/17  Long term goals  Time Frame for Long term goals : LTG=STG       Therapy Time   Individual Concurrent Group Co-treatment   Time In       0845   Time Out       0930   Minutes       45        Timed Code Treatment Minutes:  45 minutes  Total Treatment Minutes:  45 minutes    ADAM Singh  Therapist directly observed and directed this treatment.   Danii Madrid, OTR/L [id number]    Sridhar Bella OT

## 2020-10-17 NOTE — PROGRESS NOTES
Clinical Pharmacy Note     Valsartan placed on hold by Dr. Treasure Moran. Order discontinued per protocol. Please assess and reorder when appropriate. Thank you for allowing us to participate in the care of this patient.      Samaria ChowD, BCPS

## 2020-10-18 NOTE — PROGRESS NOTES
NEUROLOGY FOLLOWUP    HISTORY OF PRESENT ILLNESS :   Patient was seen earlier this afternoon for follow-up  Steffany Wolfe is a 76 y.o. male   History was obtained from the dictations in the chart. Additional history was obtained from patient family at the bedside. Apparently earlier today patient was talking better than before and was conversing well. Now he is very tired and is sleeping deeply and family did not want me to wake him up. Detailed history:  Apparently patient has severe hypertension but has not been taking his medications for more than 6 months. He developed some confusion 2 days ago. Later he developed right-sided weakness and had speech difficulty. He was unable to talk and had difficulty with comprehension as well. Onset of symptoms was fairly acute and abrupt and moderately severe. No clear aggravating or relieving factors.   Patient has never had any similar symptoms in the past.  Comorbidities include coronary artery disease hypertension hyperlipidemia and type 2 diabetes    REVIEW OF SYSTEMS       Constitutional:  []   Chills   [x]  Fatigue   []  Fevers   []  Malaise   []  Weight loss     [] Denies all of the above    Respiratory:   []  Cough    []  Shortness of breath         [x] Denies all of the above     Cardiovascular:   []  Chest pain    []  Exertional chest pressure/discomfort           [] Palpitations    []  Syncope     [x] Denies all of the above    Past Medical History:   Diagnosis Date    Acute MI ([facility] [address] 2000    CAD (coronary artery disease)     Hyperlipidemia     Hypertension     Type II or unspecified type diabetes mellitus without mention of complication, not stated as uncontrolled     diet controlled     Family History   Problem Relation Age of Onset    Heart Disease Father     High Blood Pressure Father     High Cholesterol Father     Heart Disease Maternal Grandfather  Heart Disease Paternal Grandfather      Social History     Socioeconomic History    Marital status:      Spouse name: None    Number of children: None    Years of education: None    Highest education level: None   Occupational History    None   Social Needs    Financial resource strain: None    Food insecurity     Worry: None     Inability: None    Transportation needs     Medical: None     Non-medical: None   Tobacco Use    Smoking status: Former Smoker     Packs/day: 0.00     Years: 20.00     Pack years: 0.00     Last attempt to quit: 1982     Years since quittin.6    Smokeless tobacco: Never Used   Substance and Sexual Activity    Alcohol use: Yes     Alcohol/week: 6.0 standard drinks     Types: 6 Cans of beer per week     Comment: Occ    Drug use: No    Sexual activity: None   Lifestyle    Physical activity     Days per week: None     Minutes per session: None    Stress: None   Relationships    Social connections     Talks on phone: None     Gets together: None     Attends Sikhism service: None     Active member of club or organization: None     Attends meetings of clubs or organizations: None     Relationship status: None    Intimate partner violence     Fear of current or ex partner: None     Emotionally abused: None     Physically abused: None     Forced sexual activity: None   Other Topics Concern    None   Social History Narrative    None        PHYSICAL EXAMINATION      BP (!) 138/103   Pulse 65   Temp 98.8 °F (37.1 °C) (Temporal)   Resp 18   Ht 6' (1.829 m)   Wt 181 lb 14.1 oz (82.5 kg)   SpO2 99%   BMI 24.67 kg/m²   This is a well-nourished patient in no acute distress  Detailed neuro exam was not performed since patient was sleeping and the family did not want him to be aroused. According to them earlier today he was talking and responding better than yesterday. Jose Ramon Washburn     DATA :  LABS:  General Labs:    CBC:   Lab Results   Component Value Date    WBC 6.7

## 2020-10-18 NOTE — CONSULTS
[address]                     [address] [place], [address]                                  CONSULTATION    PATIENT NAME: Benjie Rose                  :        1945  MED REC NO:   [id number]                          ROOM:       [id number]  ACCOUNT NO:   [de-identified]                           ADMIT DATE: 10/13/2020  PROVIDER:     Kenyon Craven MD    CONSULT DATE:  10/18/2020    CONSULTING PHYSICIAN:  Kenyon Craven MD    REFERRING PROVIDER:  _____. REASON FOR CONSULTATION:  EDIL, hypertension with recent CVA. HISTORY OF PRESENT ILLNESS:  The patient is a 70-year-old male with  history of acute CVA, coronary artery disease, hyperlipidemia,  hypertension, diabetes mellitus type 2, who was admitted to the hospital  on 10/13/2020 with elevated blood pressure and acute CVA. His MRI of  the brain on 10/13 showed large acute left PCA vascular territory  infarct. During initial presentation, his blood pressures were very  elevated, up to 180 to 114. Recently, his systolic blood pressure has  significantly improved with numbers as low as the 100s. He was also on  ARB. I am asked to see the patient because his creatinine increased  from 1.2 to 1.6 associated with elevated BUN. Yesterday, he was started  on normal saline and ARB has been discontinued. Creatinine has improved  to 1.3. Today, systolic blood pressure ranging in the 100s to 160s  range. The patient is confused. Daughter is at bedside. PAST MEDICAL HISTORY:  Includes acute CVA, hypertension, hyperlipidemia,  diabetes mellitus, coronary artery disease. ALLERGIES:  None. CURRENT MEDICATIONS:  Amlodipine, aspirin, atorvastatin, clopidogrel,  hydralazine, labetalol. SOCIAL HISTORY:  Lives at home. Quit smoking 38 years ago. Occasional  alcohol use. Denies any drug abuse. FAMILY HISTORY:  Includes heart disease, hypertension, hyperlipidemia.     REVIEW OF SYSTEMS:  The patient is not able to fully cooperate. PHYSICAL EXAMINATION:  VITAL SIGNS:  Blood pressure currently 108/53, pulse 52, respirations  23, temperature 98.8, saturating 99%. Weight listed at 82.5 kilos. Urine output 1250 mL, negative 60 mL since admission. GENERAL:  Confused. HEENT:  Anicteric sclerae, clear conjunctivae. SKIN:  Anicteric, no rash. CHEST:  Clear. HEART:  Regular. ABDOMEN:  Soft, nontender. No rebound or involuntary guarding. EXTREMITIES:  Shows no edema. LABORATORY DATA:  Sodium 137, potassium 3.5, chloride 105, bicarb 23,  BUN 29, creatinine 1.3, glucose 139, calcium 8.1. CK 80, albumin 3.7. WBC 6.7, hemoglobin 12.8, hematocrit 37, platelet count 871,328. Urinalysis, specific gravity 1.024, pH 5.5, blood is negative, protein  is trace. Urine sodium less than 20. MRI of the brain as noted in the  HPI. ASSESSMENT AND PLAN:  1. Acute kidney injury, probably due to decreased renal perfusion in  the setting of relative hypotension plus ARB use. Renal function is now  better. UA is bland. Continue off ARB. 2.  Hypertension. Goal blood pressure at this time is 140/90 or less. With lower heart rate, I will decrease the labetalol to 200 mg twice a  day. If blood pressure increases, can increase the hydralazine. 3.  Potassium on the lower side. Check aldosterone-renin ratio. 4.  History of coronary artery disease, symptomatically stable. 5.  History of diabetes mellitus type 2, diet controlled, as per  Medicine. 6.  History of hyperlipidemia, on statin. 7.  Acute CVA. Currently on aspirin, statin and Plavix. Blood pressure  control. Thank you for this consult. We will follow with you.         Arabella Hobbs MD    D: 10/18/2020 12:51:57       T: 10/18/2020 12:55:37     [email or web address]  Job#: [id number]     Doc#: [id number]    CC:

## 2020-10-18 NOTE — PROGRESS NOTES
Patient's family accidentally spilled water on pt. Pt received full linen change, was bathed, and got a new diaper and gown. Patient was repositioned in bed and given a warm blanket. Patient stated he was fine, and did not need anything else.   Mei Chester

## 2020-10-18 NOTE — PLAN OF CARE
Impaired:  Goal: Demonstrations of improved sensory functioning will increase  Description: Demonstrations of improved sensory functioning will increase  Outcome: Ongoing  Goal: Decrease in sensory misperception frequency  Description: Decrease in sensory misperception frequency  Outcome: Ongoing  Goal: Able to refrain from responding to false sensory perceptions  Description: Able to refrain from responding to false sensory perceptions  Outcome: Ongoing  Goal: Demonstrates accurate environmental perceptions  Description: Demonstrates accurate environmental perceptions  Outcome: Ongoing  Goal: Able to distinguish between reality-based and nonreality-based thinking  Description: Able to distinguish between reality-based and nonreality-based thinking  Outcome: Ongoing  Goal: Able to interrupt nonreality-based thinking  Description: Able to interrupt nonreality-based thinking  Outcome: Ongoing     Problem: Sleep Pattern Disturbance:  Goal: Appears well-rested  Description: Appears well-rested  Outcome: Ongoing     Problem: Cardiovascular  Goal: Hemodynamic stability  Outcome: Ongoing     Problem: Nutrition  Goal: Optimal nutrition therapy  Outcome: Ongoing     Problem: Pain:  Goal: Pain level will decrease  Description: Pain level will decrease  Outcome: Ongoing  Goal: Control of acute pain  Description: Control of acute pain  Outcome: Ongoing  Goal: Control of chronic pain  Description: Control of chronic pain  Outcome: Ongoing

## 2020-10-18 NOTE — PROGRESS NOTES
RN notified on-call MD of acute onset pain abdomen and hematemesis around 5:15 PM.  Patient's wife and daughter have been at bedside with the patient in the afternoon. He suddenly started complaining of pain in his epigastric region followed by episode of hematemesis. Patient threw up fresh blood on his clothes and on the bedding. It was one time episode. Patient later has been gagging and bringing up oral secretions which are scant and bloodstained. His wife has been using suctionIng to get the secretions out. Upon my arrival at bedside, patient was upright in bed. Reporting epigastric pain and nausea. Bloodstained secretions noted only in the tubing but not in the container. Patient's vitals as follows:    Objective:  BP (!) 180/69   Pulse 60   Temp 98.8 °F (37.1 °C) (Temporal)   Resp 20   Ht 6' (1.829 m)   Wt 181 lb 14.1 oz (82.5 kg)   SpO2 99%   BMI 24.67 kg/m²     On examination, patient has tenderness in epigastric area. Plan:    -N.p.o. effective now. -Type and screen for blood  -Hemoglobin/hematocrit x1 now. -Antiemetics PRN. -Protonix IV bolus followed by drip. -IV Dilaudid 0.5 mg every 8 hours as needed for epigastric pain.  -Discontinued aspirin, Plavix and Lovenox subcu. (last PCI was more than 2 years ago). -H/H every 6 hours. -GI consulted. -Dose monitoring of heart rate, blood pressure for signs of acute bleed. Stable BP and heart rate.    -Dr. Reena Payan from GI called and spoke to RN. Patient will be monitored for signs of ongoing bleed. N.p.o. effective now. Possible EGD in a.m. unless patient has signs of massive bleed overnight.  -Spoke with patient's wife and daughter who are at bedside. All their questions have been answered to their satisfaction.     Rosalee Mendez MD   10/18/2020 5:53 PM

## 2020-10-18 NOTE — PROGRESS NOTES
Hospitalist Progress Note      PCP: Amena Lepe    Date of Admission: 10/13/2020    Chief Complaint: Confusion and right-sided weakness    Hospital Course:   Alert, disoriented  No acute event overnight  Blood pressure is acceptable this morning  He is off nicardipine drip  Daughter at bedside  Patient denies chest pain or shortness of breath  Right-sided weakness    Medications:  Reviewed      Exam:    BP (!) 108/53   Pulse 52   Temp 98.8 °F (37.1 °C) (Temporal)   Resp 23   Ht 6' (1.829 m)   Wt 181 lb 14.1 oz (82.5 kg)   SpO2 99%   BMI 24.67 kg/m²     General appearance: Drowsy opens eyes to commands  HEENT: Pupils equal, round, and reactive to light. Conjunctivae/corneas clear. Neck: Supple, with full range of motion. No jugular venous distention. Trachea midline. Respiratory:  Normal respiratory effort. Clear to auscultation, bilaterally without RALES/WHEEZES/Rhonchi. Cardiovascular: Regular rate and rhythm with normal S1/S2 without MURMURS, rubs or gallops. Abdomen: Soft, non-tender, non-distended with normal bowel sounds. Musculoskeletal: No clubbing, cyanosis or EDEMA bilaterally. Full range of motion without deformity. Skin: Skin color, texture, turgor normal.  No rashes or lesions.   Neurologic: Unable to do visual field testing  Right hemiplegia power is 1/5 on the right side  Some dysarthria  receptive aphasia  Awake alert   Unable to answer any of my orientation questions  Still has some significant right-sided neglect        Labs:   Recent Labs     10/16/20  0514 10/17/20  0516 10/18/20  0517   WBC 13.9* 8.9 6.7   HGB 16.3 13.6 12.8*   HCT 46.6 39.4* 36.9*    133* 125*     Recent Labs     10/16/20  0514 10/17/20  0516 10/18/20  0517    138 137   K 3.7 3.5 3.5    104 105   CO2 21 23 23   BUN 26* 39* 29*   CREATININE 1.3 1.6* 1.3   CALCIUM 8.9 8.7 8.1*     Recent Labs     10/16/20  0514   AST 13*   ALT 14   BILITOT 2.6*   ALKPHOS 99     No results for input(s): INR in the last 72 hours. Recent Labs     10/18/20  0517   CKTOTAL 80       Urinalysis:      Lab Results   Component Value Date    NITRU Negative 10/17/2020    WBCUA 1 10/17/2020    BACTERIA Rare 02/28/2015    RBCUA 3 10/17/2020    BLOODU Negative 10/17/2020    SPECGRAV 1.024 10/17/2020    GLUCOSEU Negative 10/17/2020       Radiology:  XR ABDOMEN (KUB) (SINGLE AP VIEW)   Final Result   1. Nonobstructive bowel gas pattern. Mild prominence of small bowel loops   may represent mild ileus in the appropriate clinical setting. 2.  Suspect splenomegaly. CT HEAD WO CONTRAST   Final Result   Aging or evolving of known left PCA vascular territory infarct. Large area   of hypodensity is now seen in this region. No hemorrhage noted. Underlying atrophy with periventricular and at additional areas of small   vessel ischemic change      Small cavernoma posterior right parietal lobe         MRI BRAIN WO CONTRAST   Final Result   Large acute left PCA vascular territory infarct. The findings were sent to the Radiology Results [address] at 7:07   pm on 10/13/2020to be communicated to a licensed caregiver. XR CHEST (2 VW)   Final Result   1. Superior mediastinal widening, likely due to prominent vasculature, given   appearance on CT angiography of the neck performed just prior to this. 2.  Hazy opacity at the right lung base with volume loss in the right   hemithorax, suggesting atelectasis. CTA HEAD NECK W CONTRAST   Final Result   Partial occlusion in the mid to distal V4 segment of the right vertebral   artery. There is up to 40% stenosis in mid V4 segment of the left vertebral   artery. Partial occlusion in the P2 segment of the left PCA. 75% stenosis at the origin of the a M2 branch of the right MCA. 70% stenosis at the origin of the right common carotid artery. 50% stenosis at the origin of the right internal carotid artery.       30% stenosis at the origin of the left internal carotid artery. 70% severe at the distal V1 segment of the right vertebral artery. Results were reported to Dr. Mariano Ragland at 4:07 p.m. on October 13, 2020. CT HEAD WO CONTRAST   Final Result   Posteriorly in the periventricular region on the right there is a nodular   area of increased density measuring 7 mm. This appears somewhat tubular in   character. .  This could represent a small a cavernoma or venous angioma. However a small area of hemorrhage could also have this appearance. There is a bulbous appearance to the middle cerebral artery distally on the   right, either due to tortuous vessel or small aneurysm. Atrophy and small-vessel ischemic change      Results discussed with [facility] by Mónica Moore. Yudith Lea MD at 7:06 am on   10/13/2020             Assessment/Plan:    Active Hospital Problems    Diagnosis Date Noted    Acute CVA (cerebrovascular accident) [facility]) [I63.9] 10/13/2020     acute right hemiparesis and acute encephalopathy secondary to left sided infarct  MRI with infarct in the posterior circulation  CT angio of head and neck showed multiple intracranial stenoses  On aspirin and Plavix, A1c 6.4  Echo normal, no PFO  Patient will need 30-day Holter monitor  Continue telemetry    Acute kidney injury strongly suspect secondary to the blood pressure fluctuations  For now we will hold valsartan  Nephrology consulted, appreciate recommendation    Delirium   multifactorial, delirium precautions    Hypertension malignant with endorgan damage  Continue labetalol, amlodipine.   Continue to dysphagia diet  Continue hydralazine  Nephrology is following    Elevated troponin  EKG shows some new T inversions in V3 and V4 as compared to previous EKG  He does not have any chest pain  Troponins more or less flat  Seen by cardiology  On aspirin and Plavix  On Lipitor full dose    Coronary  artery disease  On aspirin and Plavix    Prediabetes  Start Metformin on discharge    L superior mediastinal widening on chest x-ray  His radial pulses are equal more importantly on the CT angiogram of the neck the chest x-ray report acknowledges that that area was included on the CT angiogram and was visualized and showed only prominent vasculature ends at this time will not pursue any further testing    Seen by physical medicine and rehab for ARU placement if blood pressure remained stable over the next 24 hours can be discharged on Monday    DVT Prophylaxis: sc lovenox   Diet: DIET DYSPHAGIA SOFT AND BITE-SIZED;  No Drinking Straw  Dietary Nutrition Supplements: Diabetic Oral Supplement  Code Status: Full Code      Dispo - once acute medical processes have resolved, evaluated for rehab    Homa Kemp MD

## 2020-10-18 NOTE — CONSULTS
Renal Consult  Full Note Dictated [id number]  A/P  1. EDIL-due to decreased renal perfusion. Better. Continue off ARB. NSS for another 12 hours. 2.  HTN-goal bp 140/90 or less. Bradycardic. Decrease Labetalol. Holding parameters. Can increase Hydralazine PRN. 3.   Acute CVA-on antiplatelets+statin. Per Medicine  4. Bradycardia  5. H/O DM-diet controlled    High risk with EDIL and recent CVA. Seen in ICU. Thank you.

## 2020-10-18 NOTE — PROGRESS NOTES
Patient about [phone] called out in pain. RN's took pain medication back to room. When they arrived pt was doubled over in bed moaning. Pt started to cough and then vomit bright red blood. He has not stopped coughing up red blood since this time. Hospitalist has been paged, and pt has been made aware of situation, and patient has been made NPO. Continuing to monitor.   Benita Granados

## 2020-10-19 NOTE — CONSULTS
Gastroenterology Consult Note      Patient: Heather Lynn  : 1945  Acct#:      Date:  10/19/2020    Subjective:       History of Present Illness  Patient is a 76 y.o.  male admitted with Acute CVA (cerebrovascular accident) [facility]) [I63.9]  Acute CVA (cerebrovascular accident) ([facility] [I63.9] who is seen in consult for hematemesis. H/o CAD, MI, DM. He was brought to the ER on 10/13 for altered mental status and right-sided weakness. He was diagnosed with CVA. He has some aphasia. Family at bedside this morning. He was not taking any of his home medications. Plavix was resumed here. Yesterday evening he reported abdominal pain coughed and then vomited bright red blood. Abdominal x-ray was done which was negative. He is drowsy this morning. Minimally verbal.      Past Medical History:   Diagnosis Date    Acute MI ([facility]     CAD (coronary artery disease)     Hyperlipidemia     Hypertension     Type II or unspecified type diabetes mellitus without mention of complication, not stated as uncontrolled     diet controlled      Past Surgical History:   Procedure Laterality Date    BACK SURGERY  2018    steel bracket inbetween vertebrae    CARDIAC SURGERY      stents    CORONARY ANGIOPLASTY WITH STENT PLACEMENT  , 2/24/2012    X4 previously ; X1 2012    ESOPHAGOSCOPY      HAND SURGERY      HAND SURGERY Left       Past Endoscopic History:  EGD with Dr Adryan Reynoso     INDICATIONS: This 28-year-old male underwent EGD and balloon dilatation in 2017 for LA grade C reflux esophagitis with a very tight stricture. He is on Plavix for life because of coronary artery disease and this cannot be stopped. At that time, I dilated him to 15 millimeters. He said it was life-changing and he was able to do significantly better. However he was recently in the emergency room with an esophageal meat impaction lasted for hours.  He has not been taking his omeprazole daily as I instructed has been taking ranitidine. Repeat endoscopy is being performed today again on Plavix. The patient and his wife understands the risks of bleeding. Informed consent was obtained including an explanation of the procedure, risks, alternatives and limitations. Impression:  #1-LA grade C reflux esophagitis with a tight stricture, dilated to 16.5 millimeters. #2-post dilatation oozing, controlled with epinephrine  #3-6 centimeter hiatal hernia  #4-inlet patch of heterotopic gastric mucosa, otherwise normal EGD     Admission Meds  No current facility-administered medications on file prior to encounter.       Current Outpatient Medications on File Prior to Encounter   Medication Sig Dispense Refill    HYDROcodone-acetaminophen (NORCO)  MG per tablet Take 1 tablet by mouth every 6 hours as needed for Pain      Cyanocobalamin (VITAMIN B 12 PO) Take by mouth      aspirin 81 MG tablet Take 1 tablet by mouth See Admin Instructions Monday Wed Fri 30 tablet 5    losartan (COZAAR) 50 MG tablet Take 1 tablet by mouth daily 90 tablet 3    labetalol (NORMODYNE) 100 MG tablet Take 1 tablet by mouth every 12 hours 180 tablet 3    clopidogrel (PLAVIX) 75 MG tablet Take 1 tablet by mouth daily 90 tablet 0    rosuvastatin (CRESTOR) 10 MG tablet TAKE 1 TABLET BY MOUTH EVERY DAY 90 tablet 3    amLODIPine (NORVASC) 10 MG tablet Take 1 tablet by mouth daily (Patient taking differently: Take 10 mg by mouth daily Indications: currently only taking 5mg 11/4/19 ) 90 tablet 3    Coenzyme Q10 (COQ10) 200 MG CAPS Take 1 tablet by mouth daily 30 capsule 11    nitroGLYCERIN (NITROLINGUAL) 0.4 MG/SPRAY 0.4 mg spray Place 1 spray under the tongue every 5 minutes as needed for Chest pain 1 Bottle 2    sildenafil (VIAGRA) 100 MG tablet Take 1 tablet by mouth as needed for Erectile Dysfunction 8 tablet 3         Allergies  No Known Allergies   Social   Social History     Tobacco Use    Smoking status: hours. No results for input(s): PTT in the last 72 hours. No results for input(s): OCCULTBLD in the last 72 hours. Imaging Studies:                            AXR 10/18/20  Impression    Paucity of small bowel gas likely reflects decompressed or fluid-filled loops    of bowel.  No apparent obstruction.         Splenomegaly, correlating with prior imaging.         Basilar airways seem thickened, suggesting inflammation. Assessment:     Active Problems:    Acute CVA (cerebrovascular accident) [facility])  Resolved Problems:    * No resolved hospital problems. *    Hematemesis -occurred last night. Hemoglobin was 12.8 yesterday and is 11.5 today. CVA  Elevated ammonia - ? Source. Normal liver on CT with IV contrast 2017. Recommendations:   - NPO  - PPI drip  - serial h/h  - Plavix held   - EGD today    Discussed with Dr. Yoli Lopes, [address]      I have personally performed a face to face diagnostic evaluation on this patient. I have interviewed and examined the patient and I agree with the findings and recommended plan of care. In summary, my findings and plan are the following:  Hematemesis while on plavix.  hgb 11.5 abd nt. Will be on ppi and plan egd to investigate source.         Dhaval Cook MD  [address]

## 2020-10-19 NOTE — PLAN OF CARE
Problem: Falls - Risk of:  Goal: Will remain free from falls  Description: Will remain free from falls  10/19/2020 0145 by Elliott Blake RN  Outcome: Ongoing     Problem: Falls - Risk of:  Goal: Absence of physical injury  Description: Absence of physical injury  10/19/2020 0145 by Elliott Blake RN  Outcome: Ongoing     Problem: Skin Integrity:  Goal: Will show no infection signs and symptoms  Description: Will show no infection signs and symptoms  10/19/2020 0145 by Elliott Blake RN  Outcome: Ongoing     Problem: Skin Integrity:  Goal: Absence of new skin breakdown  Description: Absence of new skin breakdown  10/19/2020 0145 by Elliott Blake RN  Outcome: Ongoing     Problem: Confusion - Acute:  Goal: Absence of continued neurological deterioration signs and symptoms  Description: Absence of continued neurological deterioration signs and symptoms  10/19/2020 0145 by Elliott Blake RN  Outcome: Ongoing     Problem: Confusion - Acute:  Goal: Mental status will be restored to baseline  Description: Mental status will be restored to baseline  10/19/2020 0145 by Elliott Blake RN  Outcome: Ongoing     Problem: Discharge Planning:  Goal: Ability to perform activities of daily living will improve  Description: Ability to perform activities of daily living will improve  10/19/2020 0145 by Elliott Blake RN  Outcome: Ongoing     Problem: Discharge Planning:  Goal: Participates in care planning  Description: Participates in care planning  10/19/2020 0145 by Elliott Blake RN  Outcome: Ongoing     Problem: Injury - Risk of, Physical Injury:  Goal: Will remain free from falls  Description: Will remain free from falls  10/19/2020 0145 by Elliott Blake RN  Outcome: Ongoing     Problem: Injury - Risk of, Physical Injury:  Goal: Absence of physical injury  Description: Absence of physical injury  10/19/2020 0145 by Elliott Blake RN  Outcome: Ongoing     Problem: Mood - Altered:  Goal: Mood stable  Description: Mood stable  10/19/2020 0145 by Sawyer Andrews RN  Outcome: Ongoing     Problem: Mood - Altered:  Goal: Absence of abusive behavior  Description: Absence of abusive behavior  10/19/2020 0145 by Sawyer Andrews RN  Outcome: Ongoing     Problem: Mood - Altered:  Goal: Verbalizations of feeling emotionally comfortable while being cared for will increase  Description: Verbalizations of feeling emotionally comfortable while being cared for will increase  10/19/2020 0145 by Sawyer Andrews RN  Outcome: Ongoing     Problem: Psychomotor Activity - Altered:  Goal: Absence of psychomotor disturbance signs and symptoms  Description: Absence of psychomotor disturbance signs and symptoms  10/19/2020 0145 by Sawyer Andrews RN  Outcome: Ongoing     Problem: Sensory Perception - Impaired:  Goal: Demonstrations of improved sensory functioning will increase  Description: Demonstrations of improved sensory functioning will increase  10/19/2020 0145 by Sawyer Andrews RN  Outcome: Ongoing     Problem: Sensory Perception - Impaired:  Goal: Decrease in sensory misperception frequency  Description: Decrease in sensory misperception frequency  10/19/2020 0145 by Sawyer Andrews RN  Outcome: Ongoing     Problem: Sensory Perception - Impaired:  Goal: Able to refrain from responding to false sensory perceptions  Description: Able to refrain from responding to false sensory perceptions  10/19/2020 0145 by Sawyer Andrews RN  Outcome: Ongoing     Problem: Sensory Perception - Impaired:  Goal: Demonstrates accurate environmental perceptions  Description: Demonstrates accurate environmental perceptions  10/19/2020 0145 by Sawyer Andrews RN  Outcome: Ongoing     Problem: Sensory Perception - Impaired:  Goal: Able to distinguish between reality-based and nonreality-based thinking  Description: Able to distinguish between reality-based and nonreality-based thinking  10/19/2020 0145 by Sawyer Andrews RN  Outcome: Ongoing

## 2020-10-19 NOTE — PROGRESS NOTES
Occupational Therapy  Facility/Department: [facility] ICU  Daily Treatment Note  NAME: Iram Lopez  : 1945  MRN: [id number]    Date of Service: 10/19/2020    Discharge Recommendations:      Iram Lopez scored a 7/24 on the AM-PAC ADL Inpatient form. Current research shows that an AM-PAC score of 17 or less is typically not associated with a discharge to the patient's home setting. Based on the patient's AM-PAC score and their current ADL deficits, it is recommended that the patient have 5-7 sessions per week of Occupational Therapy at d/c to increase the patient's independence. At this time, this patient demonstrates the endurance, and/or tolerance for 3 hours of therapy each day, with a treatment frequency of 5-7x/wk. Please see assessment section for further patient specific details. If patient discharges prior to next session this note will serve as a discharge summary. Please see below for the latest assessment towards goals. OT Equipment Recommendations  Equipment Needed: No  Other: continue to assess as pt progresses    Assessment   Performance deficits / Impairments: Decreased functional mobility ; Decreased balance;Decreased ADL status; Decreased endurance;Decreased high-level IADLs;Decreased cognition;Decreased vision/visual deficit; Decreased coordination;Decreased strength;Decreased sensation;Decreased fine motor control  Assessment: Patient presents well below baseline d/t above deficits; OT indicated to maximize safety/independence with ADL and IADL  Treatment Diagnosis: Above deficits associated with CVA  Prognosis: Good  Decision Making: High Complexity  OT Education: OT Role;Plan of Care;Family Education;Orientation  Patient Education: Pt is not an independent learner-will require reinforcement for carryover  REQUIRES OT FOLLOW UP: Yes  Activity Tolerance  Activity Tolerance: Treatment limited secondary to decreased cognition;Treatment limited secondary to medical complications session)  Diagnosis: CVA  Subjective  Subjective: Pt lying supine in bed upon arrival. Alert but confused and initially agitated, refusing to wear O2. RN aware.    Vital Signs  Patient Currently in Pain: No   Orientation     Objective    ADL  UE Dressing: Dependent/Total(gown)  LE Dressing: Dependent/Total  Toileting: Dependent/Total  Additional Comments: Pt incontinent of urine in brief, total A for brief change/brice care        Balance  Sitting Balance: Unable to assess(comment)  Standing Balance: Unable to assess(comment)  Bed mobility  Rolling to Left: Dependent/Total;2 Person assistance  Rolling to Right: Dependent/Total;2 Person assistance  Comment: Max A of 2  Transfers  Transfer Comments: Unsafe to assess this date secondary to inability to follow commands, severe inattention, R side neglect, expressive/receptive aphasia, and R side weakness     Cognition  Overall Cognitive Status: Exceptions  Arousal/Alertness: Inconsistent responses to stimuli  Following Commands: Inconsistently follows commands  Attention Span: Unable to maintain attention(Decreased overall attention during session)  Safety Judgement: Decreased awareness of need for safety;Decreased awareness of need for assistance  Problem Solving: Decreased awareness of errors  Insights: Not aware of deficits  Initiation: Requires cues for all  Sequencing: Requires cues for all       Plan   Plan  Times per week: 5-7  Times per day: Daily  Current Treatment Recommendations: Strengthening, Endurance Training, Balance Training, Functional Mobility Training, Safety Education & Training, Equipment Evaluation, Education, & procurement, Self-Care / ADL, Patient/Caregiver Education & Training    AM-PAC Score        AM-[facility] Inpatient Daily Activity Raw Score: 7 (10/19/20 1553)  AM-PAC Inpatient ADL T-Scale Score : 20.13 (10/19/20 1553)  ADL Inpatient CMS 0-100% Score: 92.44 (10/19/20 1553)  ADL Inpatient CMS G-Code Modifier : CM (10/19/20 1553)    Goals  Short term goals  Time Frame for Short term goals: discharge  Short term goal 1: UB ADL mod-dependent 10/19  Short term goal 2: LB ADL mod A-dependent 10/19  Short term goal 3: Fxl transfer max A-Max of 2 10/17,ongoing 10/19  Short term goal 4: Increase sitting balance to SBA for fxl task, ADL-Max A 10/17,ongoing 10/19  Short term goal 5: Self-feeding min A-ongoing 10/17,ongoing 10/19  Short term goal 6: Grooming mod A-Max A 10/17, ongoing 10/19  Long term goals  Time Frame for Long term goals : LTG=STG       Therapy Time   Individual Concurrent Group Co-treatment   Time In       1524   Time Out       1547   Minutes       23      Timed Code Treatment Minutes:  23 Minutes  Total Treatment Minutes:  [address], [address]  I have reviewed and agree with this treatment session.     Julius Mcdonnell, OTR/L [id number]

## 2020-10-19 NOTE — PROGRESS NOTES
Norco given for HA. Pt also had scheduled hydralazine. Dr Lassiter Mom at bedside and pt's progress was discussed. Will continue to monitor.

## 2020-10-19 NOTE — PROGRESS NOTES
Pt returned from EGD. Hooked back up to monitor and VS taken. Family at bedside. Will continue to monitor.

## 2020-10-19 NOTE — PROGRESS NOTES
Daughter called out that BP went from 185/79 to 92/49. Readjusted BP cuff and retook pressure. /99. Pt's O2 sats 82%. Refusing to put O2 back on. O2 sats  Dropped to 80%. Nasal Canula reapplied with help from daughters. O2 sats increased to 90%. Will continue to monitor.

## 2020-10-19 NOTE — PROGRESS NOTES
Speech  Language And Dysphagia Treatment Note    Name: Leroy Mancia  : 1945  Medical Diagnosis: Acute CVA (cerebrovascular accident) ([facility] [address] [I63.9]  Acute CVA (cerebrovascular accident) ([facility] [address] [I63.9]  Treatment Diagnosis: Mild Oropharyngeal Dysphagia, Expressive Aphasia; Receptive Aphasia; Suspected Cognitive-Linguistic Deficits  Suspected Apraxia of speech  Pain: Pt did not report pain    MRI Brain (10/13/20):      Impression    Large acute left PCA vascular territory infarct.       Chest Xray (10/13/20): Impression    1.  Superior mediastinal widening, likely due to prominent vasculature, given    appearance on CT angiography of the neck performed just prior to this.         2.  Hazy opacity at the right lung base with volume loss in the right    hemithorax, suggesting atelectasis. 10/19/2020 XR Abdomen   Impression    Paucity of small bowel gas likely reflects decompressed or fluid-filled loops    of bowel.  No apparent obstruction.         Splenomegaly, correlating with prior imaging.         Basilar airways seem thickened, suggesting inflammation. Patient's response to therapy:  Pt seen asleep and woken to SLP voice, alert and sitting upright in bed. Pt's daughter was present during session. Dysphagia Treatment:  Current Diet Level: Clear Liquid Diet  Tolerance of Current Diet Level: Pt grossly tolerating clear liquid diet this date. Previously tolerating Dysphagia III Soft and Bite sized, thin liquids, no straws, meds in puree    Assessment of Texture Tolerance:  -Impressions: Pt was seen sitting upright in bed, he was agreeable to trials of ice chips and politely declined thin liquids this date. Pt seen on 3L O2 via NC. No overt coughing, throat clearing, or change in vocal quality was noted. Pt with clear vocal quality and O2 and RR remained stable with PO trials of ice chips. Pt is currently on a clear liquid diet per MD d/t recent EGD. EGD found bleed per RN.  At this time, recommend diet of Dysphagia III Soft and Bite-Sized with thin liquids with use of aspiration precautions and diet tolerance monitoring when MD approves solid textures. Diet and Treatment Recommendations:  1.) Advance to Dysphagia III Soft and Bite-Sized with thin liquids, no straws, meds in puree, when MD OK with solids  2.) If difficulty is noted, recommend downgrade to NPO with ongoing swallowing assessment   3.) Assistance with feeding     Dysphagia Goals, addressed this date:  1.) Pt will tolerate recommended diet without s/s of aspiration (ongoing 10/19/2020)   2.) If clinical symptoms of penetration/aspiration continue to be noted,Pt will tolerate MBS to r/o aspiration and determine appropriate diet/liquid level. (ongoing 10/19/2020)   3.) Pt will tolerate diet advance to least restricted diet, as clinically indicated, with no signs of aspiration (ongoing 10/19/2020)   4.) Pt will improve oral motor function via bolus control exercises 5/5 (ongoing 10/19/2020)     Speech Language Treatment:  Impressions: Pt alert and sitting upright in bed, his daughter was present during session. Pt oriented to self only. D/O to location/situation/date. Speech Language STG, addressed this date: 1. Pt will improve basic naming skills (automatics, confrontational naming, responsive naming) to 80% given min cues (ongoing 10/19/2020)    -count 1-10: not targeted   -Days of week: not targeted   -Picture naming: not targeted   -Able to state his first and last name, able to state his daughter's name with FO2   -Stating opposites: not targeted    2. Pt will improve auditory processing/comprehension of commands and questions to 80%, via graded tasks (ongoing 10/19/2020)    -One step commands: 0/2 given max cues   -Basic yes/no questions: 5/6   -Basic wh-questions: not targeted    3. Pt will improve verbal expression of personal hx to 80% given min cues (ongoing 10/19/2020)    -Able to state name with [place] name is ___. \" with mod cues   -Pt's daughter reports, Jeanie Sanford did she say? \" and other common phrases when frustrated intermittently    4. Pt will participate in further assessment of speech-language/cognition as able with goals to be added as clinically indicated (ongoing 10/19/2020)    -Suspect visual deficits and significant right in-attention/neglect   -Oriented to place via yes/no questions     Patient/Family Education:Education given to the Pt/daughter and nurse, who verbalized understanding    Assessment: Patient progressing toward goals    Plan: Continue as per plan of care    Discharge Recommendations: Pt will benefit from continued skilled Speech Therapy for Speech and Dysphagia services, prior to returning home. Treatment time  Timed Code Treatment Minutes: 15 minutes  Total Treatment time: 23 minutes     If patient discharges prior to next session this note will serve as a discharge summary.       Chuck Greenfield, M.S., [address] Pathologist  [phone]

## 2020-10-19 NOTE — PROGRESS NOTES
Abraham Barlow, MD Marylene Pavlov, MD Malka Cara, MD               Office: [phone]                      Fax: [phone]          NEPHROLOGY PROGRESS NOTE:     PATIENT NAME: Eliezer Mullins  : 1945  MRN: [id number]      RECOMMENDATIONS:  - BP goal  ~ 140-150s. - increase PO Hydralazine  - add PRN for SBP > 170s.   - f/u w/ neuro  - EGD today- Ulcerative eso, w/ diffuse oozing, needing EPI injection, f/u w/ Hb close   - very close monitoring needed as at higher risk of decompensation. IMPRESSION    EDIL (on no-CKD ): Improving   - Oligouric- some improvement   : S Cr- Baseline ~ 0.8-0.9  -> peaked at 1.6  : Etiology of EDIL - presumed pre-renal / early ATN w/ GIB + BP fluctuation + S/p Cta Head Neck W Contrast 10/13/2020   - other differentials: unlikely    - UA : trace pro, some hyaline cast = pre-renal   - Renal imaging: not needed for now :   - Acid-Base: acidosis - mild monitoring     Other problems:  Acute Rt weakness w/ Lt brain infarct   JODY - d/to above   Delirium   Trops elevated: ?demand     Patient Active Problem List   Diagnosis    Essential hypertension, benign    Coronary atherosclerosis of native coronary artery    Palpitations    Coronary artery disease due to lipid rich plaque    Abnormal transaminases    Diverticulitis    Unstable angina (HCC)    Midline thoracic back pain    Mixed hyperlipidemia    Bilateral carotid artery stenosis    Acute CVA (cerebrovascular accident) ([facility] [address]       : other supportive care :   - Check daily renal function panel with electrolytes-phosphorus  - Strict monitoring of I/Os, daily weight  - Renal feeds/diet  - Current medications reviewed. - Nephrotoxic medications have been discontinued. - Dose adjusted and appropriate.      - Dose meds for eGFR <15 mL/min/1.73m2.   - Avoid heavy opioids due to renal failure - may use very low dose dilaudid / fentanyl with close monitoring of CNS and respiratory depression. Please refer to the orders. High Complexity. Multiple complex problems. Discussed with patient, family - at bedside and treatment team-   Thank you for allowing me to participate in this patient's care. Please do not hesitate to contact me with any questions/concerns. We will follow along with you.      Farzad Flores MD       [facility] [address] [place] [address]  Office: [phone] or [address] [employer]  Fax: [phone]          SUBJECTIVE/INTERVAL HISTORY:   - Patient was seen comfortably sitting up in the bed,   Reported no active complaints,   S/p EGD today so sleepy   Family at bedside    - ROS reported: as in HPI, CC, Subjective , and all other 10 systems' review negative,   - PMH, [address], Social history: reviewed   Past Medical History:   Diagnosis Date    Acute MI ([facility] [address] 2000    CAD (coronary artery disease)     Hyperlipidemia     Hypertension     Type II or unspecified type diabetes mellitus without mention of complication, not stated as uncontrolled     diet controlled     MEDICATIONS:  Prior to Admission Medications:  Medications Prior to Admission: HYDROcodone-acetaminophen ([address])  MG per tablet, Take 1 tablet by mouth every 6 hours as needed for Pain  Cyanocobalamin (VITAMIN B 12 PO), Take by mouth  aspirin 81 MG tablet, Take 1 tablet by mouth See Admin Instructions Monday Wed Fri  losartan (COZAAR) 50 MG tablet, Take 1 tablet by mouth daily  labetalol (NORMODYNE) 100 MG tablet, Take 1 tablet by mouth every 12 hours  clopidogrel (PLAVIX) 75 MG tablet, Take 1 tablet by mouth daily  rosuvastatin (CRESTOR) 10 MG tablet, TAKE 1 TABLET BY MOUTH EVERY DAY  amLODIPine (NORVASC) 10 MG tablet, Take 1 tablet by mouth daily (Patient taking differently: Take 10 mg by mouth daily Indications: currently only taking 5mg 11/4/19 )  Coenzyme Q10 (COQ10) 200 MG CAPS, Take 1 tablet by mouth daily  nitroGLYCERIN (NITROLINGUAL) 0.4 MG/SPRAY 0.4 mg spray, Place 1 spray under the tongue every 5 minutes as needed for Chest pain  sildenafil (VIAGRA) 100 MG tablet, Take 1 tablet by mouth as needed for Erectile Dysfunction     Scheduled Meds:   labetalol  200 mg Oral 2 times per day    hydrALAZINE  25 mg Oral 3 times per day    amLODIPine  10 mg Oral Daily    sodium chloride flush  10 mL Intravenous 2 times per day    atorvastatin  80 mg Oral Nightly     Continuous Infusions:   pantoprozole (PROTONIX) infusion 8 mg/hr (10/19/20 0619)     PRN Meds:.prochlorperazine, HYDROmorphone, sodium chloride flush, polyethylene glycol, promethazine **OR** ondansetron, perflutren lipid microspheres, HYDROcodone 5 mg - acetaminophen        OBJECTIVE:   PHYSICAL EXAM:  Patient Vitals for the past 24 hrs:   BP Temp Temp src Pulse Resp SpO2 Weight   10/19/20 0451 (!) 178/72 98.1 °F (36.7 °C) Temporal 70 20 99 % 181 lb 8 oz (82.3 kg)   10/19/20 0105 (!) 178/89 98.3 °F (36.8 °C) Temporal 66 18 96 % --   10/18/20 1800 (!) 159/64 -- -- 60 20 -- --   10/18/20 1745 -- -- -- 63 19 -- --   10/18/20 1600 (!) 180/69 -- -- 60 20 -- --   10/18/20 1545 -- -- -- 59 17 -- --   10/18/20 1540 (!) 123/94 -- -- 60 19 -- --   10/18/20 1200 (!) 108/53 -- -- 52 23 -- --   10/18/20 0952 (!) 138/103 -- -- -- -- -- --   10/18/20 0945 (!) 138/103 -- -- 65 18 -- --   10/18/20 0745 (!) 147/79 98.8 °F (37.1 °C) Temporal 61 19 99 % --       Intake/Output Summary (Last 24 hours) at 10/19/2020 0741  Last data filed at 10/19/2020 0149  Gross per 24 hour   Intake --   Output 320 ml   Net -320 ml       General: no acute distress, sleepy, **  HENT: Atraumatic, normocephalic   Eyes: Normal conjunctiva, Non-incteral sclera. Neck: Supple, JVD not visible. CVS:  Heart sounds are normal. No murmurs. .  RS: Normal respiratory efforts,  Lung fields are diminished at bases . Abd: Soft , bowel sounds are normal, and no tenderness to palpation.   Skin: No rash , some bruises,   CNS: Awake Oriented , No focal.   Extremities/MSK: mild Edema, no cyanosis. DATA:  Diagnostic tests reviewed for today's visit:    Recent Labs     10/17/20  0516 10/18/20  0517 10/18/20  1749 10/19/20  0009 10/19/20  0509   WBC 8.9 6.7  --   --   --    HCT 39.4* 36.9* 38.4* 37.3* 35.1*   * 125*  --   --   --      Iron Saturation:  No components found for: PERCENTFE  FERRITIN:    Lab Results   Component Value Date    FERRITIN 966 03/02/2015     IRON:    Lab Results   Component Value Date    IRON 46 03/02/2015     TIBC:    Lab Results   Component Value Date    TIBC 203 03/02/2015       Recent Labs     10/17/20  0516 10/18/20  0517    137   K 3.5 3.5    105   CO2 23 23   BUN 39* 29*   CREATININE 1.6* 1.3     Recent Labs     10/17/20  0516 10/18/20  0517   CALCIUM 8.7 8.1*   MG 2.50*  --      No results for input(s): PH, PCO2, PO2 in the last 72 hours.     Invalid input(s): Xavier Saenz    ABG:  No results found for: PH, PCO2, PO2, HCO3, BE, THGB, TCO2, O2SAT  VBG:  No results found for: PHVEN, PGM7TMZ, BEVEN, H8VQESSA    LDH:  No results found for: LDH  Uric Acid:  No results found for: LABURIC, URICACID    PT/INR:    Lab Results   Component Value Date    PROTIME 14.0 10/14/2020    INR 1.20 10/14/2020     Warfarin PT/INR:  No components found for: Katherin Bosworth  PTT:    Lab Results   Component Value Date    APTT 31.4 01/22/2016   [APTT}    Last 3 Troponin:    Lab Results   Component Value Date    TROPONINI 0.03 10/15/2020    TROPONINI 0.03 10/14/2020    TROPONINI 0.05 10/14/2020       U/A:    Lab Results   Component Value Date    COLORU YELLOW 10/17/2020    PROTEINU TRACE 10/17/2020    PHUR 5.5 10/17/2020    WBCUA 1 10/17/2020    RBCUA 3 10/17/2020    BACTERIA Rare 02/28/2015    CLARITYU Clear 10/17/2020    SPECGRAV 1.024 10/17/2020    LEUKOCYTESUR Negative 10/17/2020    UROBILINOGEN 0.2 10/17/2020    BILIRUBINUR SMALL 10/17/2020    BLOODU Negative 10/17/2020    GLUCOSEU Negative 10/17/2020     Microalbumen/Creatinine ratio:  No components found for: RUCREAT  24 Hour Urine for Protein:  No components found for: RAWUPRO, UHRS3, HMQJ72MR, UTV3  24 Hour Urine for Creatinine Clearance:  No components found for: CREAT4, UHRS10, UTV10  Urine Toxicology: No components found for: Casimer Mantle, IBENZO, ICOCAINE, IMARTHC, IOPIATES, IPHENCYC    HgBA1c:    Lab Results   Component Value Date    LABA1C 6.4 10/13/2020     RPR:  No results found for: RPR  HIV:  No results found for: HIV  DEANA:    Lab Results   Component Value Date    DEANA Negative 12/26/2017     RF:  No results found for: RF  DSDNA:  No components found for: DNA  AMYLASE:  No results found for: AMYLASE  LIPASE:    Lab Results   Component Value Date    LIPASE 72.0 02/27/2015     Fibrinogen Level:  No components found for: FIB      BELOW MENTIONED RADIOLOGY STUDIES REVIEWED BY ME:    Xr Chest (2 Vw)    Result Date: 10/13/2020  EXAMINATION: TWO XRAY VIEWS OF THE CHEST 10/13/2020 8:54 am COMPARISON: 02/20/2018. CT angiography of the neck dated 10/13/2020 HISTORY: ORDERING SYSTEM PROVIDED HISTORY: dyspnea TECHNOLOGIST PROVIDED HISTORY: Reason for exam:->dyspnea Reason for Exam: Altered Mental Status (pt [facility] ed [facility] after wife found him on floor, unsure how long pt confused unable to follow directions and answer questions correctly ) Acuity: Unknown Type of Exam: Unknown FINDINGS: There is mild superior mediastinal widening, but this area is included in the field of view on the previous CT angiography of the neck and presumably related to prominent vasculature. Heart size is mildly prominent. No significant vascular congestion. Patient rotation mildly limits assessment. There is hazy opacity at the medial right lung base and mild volume loss in the right hemithorax, suggesting atelectasis. No evidence of pneumothorax or sizable pleural effusion. Osseous structures are diffusely demineralized. No evidence of acute osseous abnormality.      1.  Superior mediastinal widening, likely due to prominent vasculature, given appearance on CT angiography of the neck performed just prior to this. 2.  Hazy opacity at the right lung base with volume loss in the right hemithorax, suggesting atelectasis. Xr Abdomen (kub) (single Ap View)    Result Date: 10/18/2020  EXAMINATION: ONE SUPINE XRAY VIEW(S) OF THE ABDOMEN 10/18/2020 9:39 pm COMPARISON: Abdominal radiographs 10/15/2020, 07/18/2018 HISTORY: ORDERING SYSTEM PROVIDED HISTORY: pain abdomen TECHNOLOGIST PROVIDED HISTORY: Reason for exam:->pain abdomen Reason for Exam: abd pain Acuity: Unknown Type of Exam: Unknown FINDINGS: Paucity of small bowel gas. Gas and stool in the downstream colon. No significant stool volume. Questionable enlargement of the splenic shadow. Fixation device along the lower lumbar spine. No acute osseous abnormality. No focal airspace disease at the lung bases although some of the small airways seem thickened. Paucity of small bowel gas likely reflects decompressed or fluid-filled loops of bowel. No apparent obstruction. Splenomegaly, correlating with prior imaging. Basilar airways seem thickened, suggesting inflammation. Xr Abdomen (kub) (single Ap View)    Result Date: 10/15/2020  EXAMINATION: ONE SUPINE XRAY VIEW(S) OF THE ABDOMEN 10/15/2020 11:07 am COMPARISON: CT 12/21/2017 HISTORY: ORDERING SYSTEM PROVIDED HISTORY: abdo pain TECHNOLOGIST PROVIDED HISTORY: Reason for exam:->abdo pain Acuity: Unknown FINDINGS: Nonobstructive bowel gas pattern. Few loops of small bowel measure up to 2.4 cm. Gas is present in the colon. The splenic shadow appears enlarged extending beyond the costal margin. The lung bases are clear. Status post fusion of the L4-5 posterior elements. Calcified atheromatous plaque. 1.  Nonobstructive bowel gas pattern. Mild prominence of small bowel loops may represent mild ileus in the appropriate clinical setting. 2.  Suspect splenomegaly.      Ct Head Wo Contrast    Result Date: 10/15/2020  EXAMINATION: CT OF THE HEAD WITHOUT CONTRAST  10/15/2020 10:58 am TECHNIQUE: CT of the head was performed without the administration of intravenous contrast. Dose modulation, iterative reconstruction, and/or weight based adjustment of the mA/kV was utilized to reduce the radiation dose to as low as reasonably achievable. COMPARISON: CT brain MRI 10/13/2020 HISTORY: ORDERING SYSTEM PROVIDED HISTORY: ams recent stroke TECHNOLOGIST PROVIDED HISTORY: Reason for exam:->ams recent stroke Has a \"code stroke\" or \"stroke alert\" been called? ->No Reason for Exam: ams recent stroke Acuity: Unknown Type of Exam: Unknown FINDINGS: BRAIN/VENTRICLES: Ventricles are midline in position and mildly enlarged in size. Moderate periventricular hypodensity is seen. Since prior CT scan there has been continued aging or evolving the left PCA vascular territory infarct. Large area of hypodensity is now seen in this region No obvious hemorrhage Small cavernoma posterior right parietal lobe is seen. . ORBITS: The visualized portion of the orbits demonstrate no acute abnormality. SINUSES: Fluid is seen in the mastoid air cells. There is bowing and spurring of the nasal septum. Mild mucosal thickening is seen in the maxillary sinuses. SOFT TISSUES/SKULL:  No acute abnormality of the visualized skull or soft tissues. Aging or evolving of known left PCA vascular territory infarct. Large area of hypodensity is now seen in this region. No hemorrhage noted.  Underlying atrophy with periventricular and at additional areas of small vessel ischemic change Small cavernoma posterior right parietal lobe     Ct Head Wo Contrast    Result Date: 10/13/2020  EXAMINATION: CT OF THE HEAD WITHOUT CONTRAST  10/13/2020 6:52 am TECHNIQUE: CT of the head was performed without the administration of intravenous contrast. Dose modulation, iterative reconstruction, and/or weight based adjustment of the mA/kV was utilized to reduce the radiation dose to as low as reasonably achievable. COMPARISON: None. HISTORY: ORDERING SYSTEM PROVIDED HISTORY: stroke TECHNOLOGIST PROVIDED HISTORY: Has a \"code stroke\" or \"stroke alert\" been called? ->Yes Reason for exam:->stroke Reason for Exam: stroke Acuity: Acute Type of Exam: Initial Relevant Medical/Surgical History: Altered Mental Status (pt brought to ed by [facility] after wife found him on floor, unsure how long pt confused unable to follow directions and answer questions correctly ) FINDINGS: BRAIN/VENTRICLES: Ventricles are midline in position and mildly enlarged in size. There is moderate periventricular hypodensity seen. Patchy and confluent hypodensity is seen scattered throughout the frontal and parietal white matter. Posteriorly in the periventricular region on the right there is a nodular area of increased density measuring 7 mm. This appears somewhat tubular in character. There is a bulbous appearance to the middle cerebral artery distally on the right. There is intracranial atherosclerosis. . ORBITS: The visualized portion of the orbits demonstrate no acute abnormality. SINUSES: Fluid is seen in the mastoid air cells bilaterally. Mild mucosal thickening is seen in the maxillary sinuses. There is streak artifact from dental amalgam. SOFT TISSUES/SKULL:  No acute abnormality of the visualized skull or soft tissues. Posteriorly in the periventricular region on the right there is a nodular area of increased density measuring 7 mm. This appears somewhat tubular in character. .  This could represent a small a cavernoma or venous angioma. However a small area of hemorrhage could also have this appearance. There is a bulbous appearance to the middle cerebral artery distally on the right, either due to tortuous vessel or small aneurysm. Atrophy and small-vessel ischemic change Results discussed with [facility] by Arnie Tobar.  Myra Gallagher MD at 7:06 am on 10/13/2020     Cta Head Neck W Contrast    Result Date: 10/13/2020  EXAMINATION: CTA OF THE HEAD AND NECK WITH CONTRAST 10/13/2020 6:57 am: TECHNIQUE: CTA of the head and neck was performed with the administration of intravenous contrast. Multiplanar reformatted images are provided for review. MIP images are provided for review. Stenosis of the internal carotid arteries measured using NASCET criteria. Dose modulation, iterative reconstruction, and/or weight based adjustment of the mA/kV was utilized to reduce the radiation dose to as low as reasonably achievable. COMPARISON: CT head October 13, 2020, MRI brain November 14, 2017 HISTORY: ORDERING SYSTEM PROVIDED HISTORY: stroke TECHNOLOGIST PROVIDED HISTORY: Reason for exam:->stroke Reason for Exam: stroke Acuity: Acute Type of Exam: Initial Relevant Medical/Surgical History: Altered Mental Status (pt brought to ed by [facility] after wife found him on floor, unsure how long pt confused unable to follow directions and answer questions correctly ) FINDINGS: CTA NECK: AORTIC ARCH/ARCH VESSELS: No dissection or arterial injury. No significant stenosis of the brachiocephalic or subclavian arteries. CAROTID ARTERIES: There is 70% stenosis at the origin of the right common carotid artery. There is 50% stenosis at the origin of the right internal carotid artery. There is 30% stenosis at the origin of the left internal carotid artery. VERTEBRAL ARTERIES: There is stenosis along the right cervical vertebral artery, most severe at the distal V1 segment with up to 70% stenosis. No acute abnormality or flow stenosis in the cervical portion of the left vertebral artery. SOFT TISSUES: There is moderate emphysema. No cervical or superior mediastinal lymphadenopathy. The larynx and pharynx are unremarkable. No acute abnormality of the salivary glands. There is 1 cm left thyroid nodule, does not require follow-up due to small size. BONES: No acute osseous abnormality.  CTA HEAD: ANTERIOR CIRCULATION: There is 75% stenosis at the origin of an M2 branch of the right MCA. No significant stenosis of the intracranial internal carotid, anterior cerebral, or left middle cerebral arteries. No aneurysm. POSTERIOR CIRCULATION: There is partial occlusion in the mid to distal V4 segment of the right vertebral artery. There is up to 40% stenosis in mid V4 segment of the left vertebral artery. There is partial occlusion in the P2 segment of the left PCA. No significant stenosis of the basilar, or right posterior cerebral arteries. No aneurysm. OTHER: No dural venous sinus thrombosis on this non-dedicated study. BRAIN: There is a 7 mm focus of hyperattenuation in the right parietal periventricular white matter, likely corresponding to a cavernoma, stable in size since MRI brain November 14, 2017. There is mild parenchymal volume loss. There is periventricular white matter low attenuation, likely related to mild to moderate chronic microvascular disease. There is mild hydrocephalus, stable. No mass effect or midline shift. No extra-axial fluid collection. The gray-white differentiation is maintained. There is mild mucosal thickening in the paranasal sinuses. There are moderate bilateral mastoid effusions. Partial occlusion in the mid to distal V4 segment of the right vertebral artery. There is up to 40% stenosis in mid V4 segment of the left vertebral artery. Partial occlusion in the P2 segment of the left PCA. 75% stenosis at the origin of the a M2 branch of the right MCA. 70% stenosis at the origin of the right common carotid artery. 50% stenosis at the origin of the right internal carotid artery. 30% stenosis at the origin of the left internal carotid artery. 70% severe at the distal V1 segment of the right vertebral artery. Results were reported to Dr. Warner Petersen at 4:07 p.m. on October 13, 2020.      Mri Brain Wo Contrast    Result Date: 10/13/2020  EXAMINATION: MRI OF THE BRAIN WITHOUT CONTRAST  10/13/2020 6:16 pm TECHNIQUE: Multiplanar multisequence MRI of the brain was performed without the administration of intravenous contrast. COMPARISON: None. HISTORY: ORDERING SYSTEM PROVIDED HISTORY: cva TECHNOLOGIST PROVIDED HISTORY: Reason for exam:->cva Reason for Exam: cva disorientation aphasia Acuity: Acute Type of Exam: Unknown FINDINGS: INTRACRANIAL STRUCTURES/VENTRICLES: There is an acute infarct within the left occipitotemporal lobe, extending into the ipsilateral thalamus. This measures at least 9 cm in diameter. No mass, shift, or acute intracranial bleed is identified. There is volume loss with mild-to-moderate chronic white matter microvascular ischemic change. Normal expected signal voids are present within the vessels at the base of skull. A focus of susceptibility artifact adjacent to the right lateral ventricle posteriorly on image number 23 of series 8 could reflect a small cavernoma. ORBITS: The visualized portion of the orbits demonstrate no acute abnormality. SINUSES: There is a moderate to severe right mastoid effusion. BONES/SOFT TISSUES: The bone marrow signal intensity appears normal. The soft tissues demonstrate no acute abnormality. Large acute left PCA vascular territory infarct. The findings were sent to the Radiology Results [address] at 7:07 pm on 10/13/2020to be communicated to a licensed caregiver.

## 2020-10-19 NOTE — ANESTHESIA PRE PROCEDURE
Clark Castillo MD   10 mg at 10/19/20 0103    hydrALAZINE (APRESOLINE) tablet 50 mg  50 mg Oral 3 times per day Narinder Desouza MD        labetalol (NORMODYNE) tablet 200 mg  200 mg Oral 2 times per day Veronique Chirinos MD        HYDROmorphone HCl PF (DILAUDID) injection 0.5 mg  0.5 mg Intravenous Q8H PRN Jordana Johnson MD        pantoprazole (PROTONIX) 80 mg in sodium chloride 0.9 % 100 mL infusion  8 mg/hr Intravenous Continuous Jordana Johnson MD 10 mL/hr at 10/19/20 0619 8 mg/hr at 10/19/20 0619    amLODIPine (NORVASC) tablet 10 mg  10 mg Oral Daily KARINE Panda - CNP   10 mg at 10/18/20 8377    sodium chloride flush 0.9 % injection 10 mL  10 mL Intravenous 2 times per day Alonso France MD   10 mL at 10/17/20 0926    sodium chloride flush 0.9 % injection 10 mL  10 mL Intravenous PRN Alonso France MD   10 mL at 10/14/20 0342    polyethylene glycol (GLYCOLAX) packet 17 g  17 g Oral Daily PRN Alonso France MD        promethazine (PHENERGAN) tablet 12.5 mg  12.5 mg Oral Q6H PRN Alonso France MD        Or    [facility] 4 mg  4 mg Intravenous Q6H PRN Alonso France MD   4 mg at 10/18/20 2128    perflutren lipid microspheres (DEFINITY) injection 1.65 mg  1.5 mL Intravenous ONCE PRN Alonso France MD        atorvastatin (LIPITOR) tablet 80 mg  80 mg Oral Nightly Alonso France MD   80 mg at 10/17/20 2109    HYDROcodone-acetaminophen ([facility]) 5-325 MG per tablet 2 tablet  2 tablet Oral Q6H PRN Alonso France MD   2 tablet at 10/18/20 5181       Allergies:  No Known Allergies    Problem List:    Patient Active Problem List   Diagnosis Code    Essential hypertension, benign I10    Coronary atherosclerosis of native coronary artery I25.10    Palpitations R00.2    Coronary artery disease due to lipid rich plaque I25.10, I25.83    Abnormal transaminases R74.8    Diverticulitis K57.92    Unstable angina ([address] I20.0    Midline thoracic back pain M54.6    Mixed hyperlipidemia E78.2 Value Date    WBC 6.7 10/18/2020    RBC 4.19 10/18/2020    HGB 11.5 10/19/2020    HCT 35.1 10/19/2020    MCV 88.0 10/18/2020    RDW 13.8 10/18/2020     10/18/2020       CMP:   Lab Results   Component Value Date     10/19/2020    K 4.4 10/19/2020    K 3.9 10/13/2020     10/19/2020    CO2 19 10/19/2020    BUN 44 10/19/2020    CREATININE 1.2 10/19/2020    GFRAA >60 10/19/2020    GFRAA >60 02/25/2012    AGRATIO 1.4 10/16/2020    LABGLOM 59 10/19/2020    GLUCOSE 154 10/19/2020    PROT 6.4 10/16/2020    CALCIUM 8.0 10/19/2020    BILITOT 2.6 10/16/2020    ALKPHOS 99 10/16/2020    AST 13 10/16/2020    ALT 14 10/16/2020       POC Tests: No results for input(s): POCGLU, POCNA, POCK, POCCL, POCBUN, POCHEMO, POCHCT in the last 72 hours. Coags:   Lab Results   Component Value Date    PROTIME 14.0 10/14/2020    INR 1.20 10/14/2020    APTT 31.4 01/22/2016       HCG (If Applicable): No results found for: PREGTESTUR, PREGSERUM, HCG, HCGQUANT     ABGs:   Lab Results   Component Value Date    PHART 7.449 10/15/2020    PO2ART 67.4 10/15/2020    XAW6MPE 35.9 10/15/2020    BOQ2FMA 24.9 10/15/2020    BEART 1 10/15/2020    E6HUJKXE 94 10/15/2020        Type & Screen (If Applicable):  No results found for: LABABO, LABRH    Drug/Infectious Status (If Applicable):  No results found for: HIV, HEPCAB    COVID-19 Screening (If Applicable): No results found for: COVID19      Anesthesia Evaluation  Patient summary reviewed and Nursing notes reviewed  Airway: Mallampati: II        Dental:          Pulmonary:                              Cardiovascular:  Exercise tolerance: poor (<4 METS),   (+) hypertension:, angina:, past MI:, CAD:, CABG/stent:,                   Neuro/Psych:   (+) CVA:, psychiatric history:depression/anxiety             GI/Hepatic/Renal:             Endo/Other:    (+) Diabetes, .                  Abdominal:           Vascular:                                        Anesthesia Plan      MAC     ASA 4 - effie Harding MD   10/19/2020

## 2020-10-19 NOTE — ANESTHESIA POSTPROCEDURE EVALUATION
Department of Anesthesiology  Postprocedure Note    Patient: Nichole Campo  MRN: [id number]  YOB: 1945  Date of evaluation: 10/19/2020  Time:  10:11 AM     Procedure Summary     Date:  10/19/20 Room / Location:  [address] [place] [address]    Anesthesia Start:  2067 Anesthesia Stop:      Procedure:  EGD SUBMUCOSAL INJECTION OF EPINEPHRINE INTO GE JUNCTION ULCER (N/A Abdomen) Diagnosis:  (hematemesis)    Surgeon:  Robbie Keene MD Responsible Provider:  Naren Edwards MD    Anesthesia Type:  MAC ASA Status:  4 - Emergent          Anesthesia Type: MAC    Lucy Phase I:      Lucy Phase II:      Last vitals: Reviewed and per EMR flowsheets.        Anesthesia Post Evaluation    Patient location during evaluation: PACU  Patient participation: complete - patient participated  Level of consciousness: awake  Airway patency: patent  Nausea & Vomiting: no vomiting and no nausea  Complications: no  Cardiovascular status: hemodynamically stable  Respiratory status: acceptable  Hydration status: stable

## 2020-10-19 NOTE — PLAN OF CARE
Problem: Falls - Risk of:  Goal: Will remain free from falls  Description: Will remain free from falls  10/19/2020 1513 by Zac Jaimes RN  Outcome: Ongoing  10/19/2020 0145 by Leroy Flaherty RN  Outcome: Ongoing  Goal: Absence of physical injury  Description: Absence of physical injury  10/19/2020 1513 by Zac Jaimes RN  Outcome: Ongoing  10/19/2020 0145 by Leroy Flaherty RN  Outcome: Ongoing     Problem: Skin Integrity:  Goal: Will show no infection signs and symptoms  Description: Will show no infection signs and symptoms  10/19/2020 1513 by Zac Jaimes RN  Outcome: Ongoing  10/19/2020 0145 by Leroy Flaherty RN  Outcome: Ongoing  Goal: Absence of new skin breakdown  Description: Absence of new skin breakdown  10/19/2020 1513 by Zac Jaimes RN  Outcome: Ongoing  10/19/2020 0145 by Leroy Flaherty RN  Outcome: Ongoing     Problem: Confusion - Acute:  Goal: Absence of continued neurological deterioration signs and symptoms  Description: Absence of continued neurological deterioration signs and symptoms  10/19/2020 1513 by Zac Jaimes RN  Outcome: Ongoing  10/19/2020 0145 by Leroy Flaherty RN  Outcome: Ongoing  Goal: Mental status will be restored to baseline  Description: Mental status will be restored to baseline  10/19/2020 1513 by Zac Jaimes RN  Outcome: Ongoing  10/19/2020 0145 by Leroy Flaherty RN  Outcome: Ongoing     Problem: Discharge Planning:  Goal: Ability to perform activities of daily living will improve  Description: Ability to perform activities of daily living will improve  10/19/2020 1513 by Zac Jaimes RN  Outcome: Ongoing  10/19/2020 0145 by Leroy Flaherty RN  Outcome: Ongoing  Goal: Participates in care planning  Description: Participates in care planning  10/19/2020 1513 by Zac Jaimes RN  Outcome: Ongoing  10/19/2020 0145 by Leroy Flaherty RN  Outcome: Ongoing  Goal: Discharged to appropriate level of care  Description: Discharged to appropriate level of care  Outcome: Ongoing     Problem: Injury - Risk of, Physical Injury:  Goal: Will remain free from falls  Description: Will remain free from falls  10/19/2020 1513 by Ronne Apley, RN  Outcome: Ongoing  10/19/2020 0145 by Dakota Kyle RN  Outcome: Ongoing  Goal: Absence of physical injury  Description: Absence of physical injury  10/19/2020 1513 by Ronne Apley, RN  Outcome: Ongoing  10/19/2020 0145 by Dakota Kyle RN  Outcome: Ongoing     Problem: Mood - Altered:  Goal: Mood stable  Description: Mood stable  10/19/2020 1513 by Ronne Apley, RN  Outcome: Ongoing  10/19/2020 0145 by Dakota Kyle RN  Outcome: Ongoing  Goal: Absence of abusive behavior  Description: Absence of abusive behavior  10/19/2020 1513 by Ronne Apley, RN  Outcome: Ongoing  10/19/2020 0145 by Dakota Kyle RN  Outcome: Ongoing  Goal: Verbalizations of feeling emotionally comfortable while being cared for will increase  Description: Verbalizations of feeling emotionally comfortable while being cared for will increase  10/19/2020 1513 by Ronne Apley, RN  Outcome: Ongoing  10/19/2020 0145 by Dakota Kyle RN  Outcome: Ongoing     Problem: Psychomotor Activity - Altered:  Goal: Absence of psychomotor disturbance signs and symptoms  Description: Absence of psychomotor disturbance signs and symptoms  10/19/2020 1513 by Ronne Apley, RN  Outcome: Ongoing  10/19/2020 0145 by Dakota Kyle RN  Outcome: Ongoing     Problem: Sensory Perception - Impaired:  Goal: Demonstrations of improved sensory functioning will increase  Description: Demonstrations of improved sensory functioning will increase  10/19/2020 1513 by Ronne Apley, RN  Outcome: Ongoing  10/19/2020 0145 by Dakota Kyle RN  Outcome: Ongoing  Goal: Decrease in sensory misperception frequency  Description: Decrease in sensory misperception frequency  10/19/2020 1513 by Nasreen Arguello RN  Outcome: Ongoing  10/19/2020 0145 by Sawyer Andrews RN  Outcome: Ongoing  Goal: Able to refrain from responding to false sensory perceptions  Description: Able to refrain from responding to false sensory perceptions  10/19/2020 1513 by Nasreen Arguello RN  Outcome: Ongoing  10/19/2020 0145 by Sawyer Andrews RN  Outcome: Ongoing  Goal: Demonstrates accurate environmental perceptions  Description: Demonstrates accurate environmental perceptions  10/19/2020 1513 by Nasreen Arguello RN  Outcome: Ongoing  10/19/2020 0145 by Sawyer Andrews RN  Outcome: Ongoing  Goal: Able to distinguish between reality-based and nonreality-based thinking  Description: Able to distinguish between reality-based and nonreality-based thinking  10/19/2020 1513 by Nasreen Arguello RN  Outcome: Ongoing  10/19/2020 0145 by Sawyer Andrews RN  Outcome: Ongoing  Goal: Able to interrupt nonreality-based thinking  Description: Able to interrupt nonreality-based thinking  10/19/2020 1513 by Nasreen Arguello RN  Outcome: Ongoing  10/19/2020 0145 by Sawyer Andrews RN  Outcome: Ongoing     Problem: Sleep Pattern Disturbance:  Goal: Appears well-rested  Description: Appears well-rested  10/19/2020 1513 by Nasreen Arguello RN  Outcome: Ongoing  10/19/2020 0145 by Sawyer Andrews RN  Outcome: Ongoing     Problem: Cardiovascular  Goal: Hemodynamic stability  10/19/2020 1513 by Nasreen Arguello RN  Outcome: Ongoing  10/19/2020 0145 by Sawyer Andrews RN  Outcome: Ongoing     Problem: Nutrition  Goal: Optimal nutrition therapy  10/19/2020 1513 by Nasreen Arguello RN  Outcome: Ongoing  10/19/2020 0145 by Sawyer Andrews RN  Outcome: Ongoing     Problem: Pain:  Goal: Pain level will decrease  Description: Pain level will decrease  10/19/2020 1513 by Nasreen Arguello RN  Outcome: Ongoing  10/19/2020 0145 by Sawyer Andrews RN  Outcome: Ongoing  Goal: Control of acute pain  Description: Control of acute pain  10/19/2020 1513 by Mahsa Alvarez RN  Outcome: Ongoing  10/19/2020 0145 by Arben Wang RN  Outcome: Ongoing  Goal: Control of chronic pain  Description: Control of chronic pain  10/19/2020 1513 by Mahsa Alvarez RN  Outcome: Ongoing  10/19/2020 0145 by Arben Wang RN  Outcome: Ongoing     Problem: HEMODYNAMIC STATUS  Goal: Patient has stable vital signs and fluid balance  Outcome: Ongoing     Problem: ACTIVITY INTOLERANCE/IMPAIRED MOBILITY  Goal: Mobility/activity is maintained at optimum level for patient  Outcome: Ongoing     Problem: COMMUNICATION IMPAIRMENT  Goal: Ability to express needs and understand communication  Outcome: Ongoing     Problem:  Bowel Function - Altered:  Goal: Bowel elimination is within specified parameters  Description: Bowel elimination is within specified parameters  Outcome: Ongoing     Problem: Fluid Volume - Imbalance:  Goal: Absence of imbalanced fluid volume signs and symptoms  Description: Absence of imbalanced fluid volume signs and symptoms  Outcome: Ongoing  Goal: Will show no signs and symptoms of excessive bleeding  Description: Will show no signs and symptoms of excessive bleeding  Outcome: Ongoing     Problem: Nausea/Vomiting:  Goal: Absence of nausea/vomiting  Description: Absence of nausea/vomiting  Outcome: Ongoing  Goal: Able to drink  Description: Able to drink  Outcome: Ongoing  Goal: Able to eat  Description: Able to eat  Outcome: Ongoing  Goal: Ability to achieve adequate nutritional intake will improve  Description: Ability to achieve adequate nutritional intake will improve  Outcome: Ongoing

## 2020-10-19 NOTE — PROGRESS NOTES
Physical Therapy  Facility/Department: [facility] ICU  Daily Treatment Note  NAME: Keila Morales  : 1945  MRN: [id number]    Date of Service: 10/19/2020    Discharge Recommendations:Jose Maria Rojas scored a  on the AM-PAC short mobility form. Current research shows that an AM-PAC score of 17 or less is typically not associated with a discharge to the patient's home setting. Based on the patient's AM-PAC score and their current functional mobility deficits, it is recommended that the patient have 5-7 sessions per week of Physical Therapy at d/c to increase the patient's independence. At this time, this patient demonstrates the endurance, and/or tolerance for 3 hours of therapy each day, with a treatment frequency of 5-7x/wk. Please see assessment section for further patient specific details. If patient discharges prior to next session this note will serve as a discharge summary. Please see below for the latest assessment towards goals. 5-7 sessions per week   PT Equipment Recommendations  Equipment Needed: No    Assessment   Body structures, Functions, Activity limitations: Decreased functional mobility ; Decreased strength;Decreased endurance;Decreased safe awareness;Decreased sensation;Decreased ROM; Decreased balance;Decreased posture  Assessment: Dependent max A x 2 bed mob. No OOB activity due to high BPs per nursing. Patient not at baseline function and would benefit from skilled PT to address above deficits and facilitate return to baseline function. Presents with significant R sided weakness, R sided neglect/inattention and communication deficits.  Not safe to return home at this time  Treatment Diagnosis: decreased functional mobility, impaired gait, decreased balance, weakness s/p CVA  Prognosis: Good  Exam: AM-PAC, strength, mobility  Clinical Presentation: unpredictable  PT Education: Plan of Care;Goals;PT Role;Orientation  Patient Education: provided increased tactile input to session.)  Subjective  Subjective: Pt did not reply when asked about pain. Pt with high BPs this am and afternoon. Nursing giving permission to see pt. General Comment  Comments: Supine in bed upon arrival.Pt with soaked brief and sheets, agreeable to getting cleaned up. Pain Screening  Patient Currently in Pain: No  Pain Assessment  Pain Assessment: Faces  [place] Pain Rating: No hurt  Vital Signs  Patient Currently in Pain: No       Orientation  Orientation  Overall Orientation Status: Impaired  Orientation Level: Oriented to person(unable to state; responds to name)  Cognition      Objective   Bed mobility  Rolling to Left: Dependent/Total;2 Person assistance  Rolling to Right: Dependent/Total;2 Person assistance  Scooting: Dependent/Total;2 Person assistance  Comment: Max A x 2 all bed mob. Pt assisted with brief, linen, and gown change. Pt not able to follow VCs for moving UEs or LEs but doing occasional spontaneous movements of L UE mostly. Transfers  Comment: Did not assess due to high BPs. Ambulation  Ambulation?: No  Stairs/Curb  Stairs?: No                  Comment: Pt positioned in bed of comfort and support with pillows. Attempted multiple times to place nasal canula but pt combative, pulling it out of nose and sticking in mouth, biting it and not releasing when instructed. O2 sats 88%. G-Code     OutComes Score                                                     AM-PAC Score  AM-PAC Inpatient Mobility Raw Score : 7 (10/19/20 1600)  AM-PAC Inpatient T-Scale Score : 26.42 (10/19/20 1600)  Mobility Inpatient CMS 0-100% Score: 92.36 (10/19/20 1600)  Mobility Inpatient CMS G-Code Modifier : CM (10/19/20 1600)          Goals  Short term goals  Time Frame for Short term goals:  To be met prior to discharge (no goals met in full this date)  Short term goal 1: Bed mobility with max A  Short term goal 2: Sit EOB 3-5 minutes with SBA  Short term goal 3: Sit to/from stand with max A  Short term goal 4: Bed to/from chair with max A  Patient Goals   Patient goals : unable to state    Plan    Plan  Times per week: 5-7  Times per day: Daily  Current Treatment Recommendations: Strengthening, ROM, Balance Training, Transfer Training, Gait Training, Safety Education & Training, Patient/Caregiver Education & Training  Safety Devices  Type of devices: Bed alarm in place, Call light within reach, Left in bed, Nurse notified  Restraints  Initially in place: No     Therapy Time   Individual Concurrent Group Co-treatment   Time In       1524   Time Out       1547   Minutes       23   Timed Code Treatment Minutes: [address] [facility], [id number]

## 2020-10-19 NOTE — PROGRESS NOTES
NEUROLOGY FOLLOWUP    HISTORY OF PRESENT ILLNESS :     Donna Bird is a 76 y.o. male   History was obtained from the dictations in the chart. Patient had some GI bleed and hematemesis yesterday. Patient had an EGD this morning which showed bleeding esophagitis. Patient is now on IV Protonix and his antiplatelet therapy has been stopped temporarily. Patient's blood pressure is again running high. Detailed history:  Apparently patient has severe hypertension but has not been taking his medications for more than 6 months. He developed some confusion 2 days ago. Later he developed right-sided weakness and had speech difficulty. He was unable to talk and had difficulty with comprehension as well. Onset of symptoms was fairly acute and abrupt and moderately severe. No clear aggravating or relieving factors.   Patient has never had any similar symptoms in the past.  Comorbidities include coronary artery disease hypertension hyperlipidemia and type 2 diabetes    REVIEW OF SYSTEMS       Constitutional:  []   Chills   [x]  Fatigue   []  Fevers   []  Malaise   []  Weight loss     [] Denies all of the above    Respiratory:   []  Cough    []  Shortness of breath         [x] Denies all of the above     Cardiovascular:   []  Chest pain    []  Exertional chest pressure/discomfort           [] Palpitations    []  Syncope     [x] Denies all of the above    Past Medical History:   Diagnosis Date    Acute MI ([facility] [address] 2000    CAD (coronary artery disease)     Hyperlipidemia     Hypertension     Type II or unspecified type diabetes mellitus without mention of complication, not stated as uncontrolled     diet controlled     Family History   Problem Relation Age of Onset    Heart Disease Father     High Blood Pressure Father     High Cholesterol Father     Heart Disease Maternal Grandfather     Heart Disease Paternal Grandfather Social History     Socioeconomic History    Marital status:      Spouse name: None    Number of children: None    Years of education: None    Highest education level: None   Occupational History    None   Social Needs    Financial resource strain: None    Food insecurity     Worry: None     Inability: None    Transportation needs     Medical: None     Non-medical: None   Tobacco Use    Smoking status: Former Smoker     Packs/day: 0.00     Years: 20.00     Pack years: 0.00     Last attempt to quit: 1982     Years since quittin.6    Smokeless tobacco: Never Used   Substance and Sexual Activity    Alcohol use: Yes     Alcohol/week: 6.0 standard drinks     Types: 6 Cans of beer per week     Comment: Occ    Drug use: No    Sexual activity: None   Lifestyle    Physical activity     Days per week: None     Minutes per session: None    Stress: None   Relationships    Social connections     Talks on phone: None     Gets together: None     Attends Sikh service: None     Active member of club or organization: None     Attends meetings of clubs or organizations: None     Relationship status: None    Intimate partner violence     Fear of current or ex partner: None     Emotionally abused: None     Physically abused: None     Forced sexual activity: None   Other Topics Concern    None   Social History Narrative    None        PHYSICAL EXAMINATION      BP (!) 205/87   Pulse 91   Temp 100.7 °F (38.2 °C) (Temporal)   Resp 24   Ht 6' (1.829 m)   Wt 181 lb 8 oz (82.3 kg)   SpO2 91%   BMI 24.62 kg/m²   This is a well-nourished patient in no acute distress  Patient is awake and alert. He has significant expressive aphasia as well as some comprehension difficulties and is unable to cooperate for detailed mental status testing. Pupils equal round reacting to light. Visual field examination shows a right homonymous hemianopsia. Mild right facial droop. Tongue is in the midline. Motor examination shows right hemiplegia with a strength of 1-2 over 5. Tone was diminished on the right side. Coordination is impaired on the right side and normal on the left. Reflexes absent on the right and 1+ on the left. Right plantar reflexes extensor rest the left is flexor. Gait impaired. No carotid bruit. No neck stiffness.     DATA :  LABS:  General Labs:    CBC:   Lab Results   Component Value Date    WBC 6.7 10/18/2020    RBC 4.19 10/18/2020    HGB 12.8 10/19/2020    HCT 37.2 10/19/2020    MCV 88.0 10/18/2020    MCH 30.5 10/18/2020    MCHC 34.6 10/18/2020    RDW 13.8 10/18/2020     10/18/2020    MPV 8.4 10/18/2020     BMP:    Lab Results   Component Value Date     10/19/2020    K 4.4 10/19/2020    K 3.9 10/13/2020     10/19/2020    CO2 19 10/19/2020    BUN 44 10/19/2020    LABALBU 3.1 10/19/2020    CREATININE 1.2 10/19/2020    CALCIUM 8.0 10/19/2020    GFRAA >60 10/19/2020    GFRAA >60 02/25/2012    LABGLOM 59 10/19/2020    GLUCOSE 154 10/19/2020     RADIOLOGY REVIEW:  I have reviewed radiology image(s) and reports(s) of: MRI brain      IMPRESSION :  Acute left posterior cerebral artery infarction  Global aphasia with both expression and comprehension difficulties  Right hemiparesis  Ataxia  Hypertension   Hyperlipidemia  CT angiogram showed intracranial stenosis involving the posterior circulation  Echocardiogram showed left ventricular hypertrophy  GI bleed, status post EGD  Patient is now off antiplatelet therapy  Patient Active Problem List   Diagnosis    Essential hypertension, benign    Coronary atherosclerosis of native coronary artery    Palpitations    Coronary artery disease due to lipid rich plaque    Abnormal transaminases    Diverticulitis    Unstable angina (HCC)    Midline thoracic back pain    Mixed hyperlipidemia    Bilateral carotid artery stenosis    Acute CVA (cerebrovascular accident) ([address]       RECOMMENDATIONS :  Discussed with patient's nurse  Antiplatelet therapy will be restarted after a few days when okay with GI  Statin therapy  Try to decrease the systolic blood pressure and keep it around around 130  Physical therapy and Occupational Therapy evaluation  Patient may need inpatient rehabilitation unit when he is medically more stable  High risk patient because of acute stroke and multiple other comorbidities              Please note a portion of this chart was generated using dragon dictation software. Although every effort was made to ensure the accuracy of this automated transcription, some errors in transcription may have occurred.          Tammie Neal M.D.

## 2020-10-19 NOTE — BRIEF OP NOTE
EGD:   Ulcerative esophagitis with clot and diffuse oozing from ulceration. No V.V. Controlled with epi injection. Large hiatal hernia               2 clean based ulcers in the bulb      Rec:  PPI IV            Clear liq diet             Keep head elevated 35 degrees             Would hold anticoagulation 2-3 more days             Repeat EGD in 8 wks.        Carlitos Curtis MD  [address] [facility]

## 2020-10-19 NOTE — PROGRESS NOTES
Returned to room per stretcher on portable O2 at [facility] and portable monitory. Respirations easy. BP still up. Report given to ICU RN.

## 2020-10-20 NOTE — PROGRESS NOTES
MD Ajay Issa MD Andrey Ford, MD               Office: [phone]                      Fax: [phone]          NEPHROLOGY PROGRESS NOTE:     PATIENT NAME: Jhonny Schwab  : 1945  MRN: [id number]      RECOMMENDATIONS:  - BP fluctuating - goal ~ 140-150s. - keep increased PO Hydralazine - if tolerated  - added PRN for SBP > 170s.   - f/u w/ neuro, GI  - on going plans to d/c to ARU   - stable from renal POV       IMPRESSION    EDIL (on no-CKD ): Improving   - Oligouric- some improvement   : S Cr- Baseline ~ 0.8-0.9  -> peaked at 1.6  : Etiology of EDIL - presumed pre-renal / early ATN w/ GIB + BP fluctuation + S/p Cta Head Neck W Contrast 10/13/2020   - other differentials: unlikely    - UA : trace pro, some hyaline cast = pre-renal   - Renal imaging: not needed for now :   - Acid-Base: acidosis - mild monitoring     Other problems:  Acute Rt weakness w/ Lt brain infarct   JODY - d/to above   Delirium   Trops elevated: ?demand     GIB - - EGD on 10/19 - Ulcerative eso, w/ diffuse oozing, needing EPI injection, f/u w/ Hb close    Patient Active Problem List   Diagnosis    Essential hypertension, benign    Coronary atherosclerosis of native coronary artery    Palpitations    Coronary artery disease due to lipid rich plaque    Abnormal transaminases    Diverticulitis    Unstable angina (HCC)    Midline thoracic back pain    Mixed hyperlipidemia    Bilateral carotid artery stenosis    Acute CVA (cerebrovascular accident) ([facility] [address]            Please refer to the orders. High Complexity. Multiple complex problems. Discussed with patient, and treatment team-   Thank you for allowing me to participate in this patient's care. Please do not hesitate to contact me with any questions/concerns. We will follow along with you.      Allen Sutton MD       [facility] [address] [place]: [phone] or [address] [employer]  Fax: [phone]          SUBJECTIVE/INTERVAL HISTORY:   -  no active complaints,   S/p EGD yesterday   - ROS reported: as in HPI, CC, Subjective , and all other 10 systems' review negative,   - PMH, [address], Social history: reviewed   Past Medical History:   Diagnosis Date    Acute MI ([facility] [address] 2000    CAD (coronary artery disease)     Hyperlipidemia     Hypertension     Type II or unspecified type diabetes mellitus without mention of complication, not stated as uncontrolled     diet controlled     MEDICATIONS:  Prior to Admission Medications:  Medications Prior to Admission: HYDROcodone-acetaminophen (An Mini)  MG per tablet, Take 1 tablet by mouth every 6 hours as needed for Pain  Cyanocobalamin (VITAMIN B 12 PO), Take by mouth  aspirin 81 MG tablet, Take 1 tablet by mouth See Admin Instructions Monday Wed Fri  losartan (COZAAR) 50 MG tablet, Take 1 tablet by mouth daily  labetalol (NORMODYNE) 100 MG tablet, Take 1 tablet by mouth every 12 hours  clopidogrel (PLAVIX) 75 MG tablet, Take 1 tablet by mouth daily  rosuvastatin (CRESTOR) 10 MG tablet, TAKE 1 TABLET BY MOUTH EVERY DAY  amLODIPine (NORVASC) 10 MG tablet, Take 1 tablet by mouth daily (Patient taking differently: Take 10 mg by mouth daily Indications: currently only taking 5mg 11/4/19 )  Coenzyme Q10 (COQ10) 200 MG CAPS, Take 1 tablet by mouth daily  nitroGLYCERIN (NITROLINGUAL) 0.4 MG/SPRAY 0.4 mg spray, Place 1 spray under the tongue every 5 minutes as needed for Chest pain  sildenafil (VIAGRA) 100 MG tablet, Take 1 tablet by mouth as needed for Erectile Dysfunction     Scheduled Meds:   hydrALAZINE  50 mg Oral 3 times per day    labetalol  200 mg Oral 2 times per day    amLODIPine  10 mg Oral Daily    sodium chloride flush  10 mL Intravenous 2 times per day    atorvastatin  80 mg Oral Nightly     Continuous Infusions:   pantoprozole (PROTONIX) infusion 8 mg/hr (10/19/20 4273)     PRN Meds:.prochlorperazine, labetalol, HYDROmorphone, sodium chloride flush, polyethylene glycol, promethazine **OR** ondansetron, perflutren lipid microspheres, HYDROcodone 5 mg - acetaminophen        OBJECTIVE:   PHYSICAL EXAM:  Patient Vitals for the past 24 hrs:   BP Temp Temp src Pulse Resp SpO2   10/20/20 0500 (!) 142/58 -- -- 66 18 --   10/20/20 0400 135/62 99.2 °F (37.3 °C) Temporal 65 22 --   10/20/20 0300 (!) 144/55 -- -- 61 17 --   10/20/20 0200 (!) 150/52 -- -- 62 16 --   10/20/20 0100 (!) 120/58 -- -- 59 12 --   10/20/20 0000 (!) 129/50 99.4 °F (37.4 °C) -- 62 23 --   10/19/20 2300 (!) 95/40 -- -- 55 (!) 0 --   10/19/20 2206 (!) 130/57 -- -- -- -- --   10/19/20 2200 (!) 135/54 -- -- 63 10 --   10/19/20 2100 -- -- -- 77 18 --   10/19/20 2000 (!) 192/163 99.6 °F (37.6 °C) -- 80 19 --   10/19/20 1900 (!) 183/76 -- -- 85 14 --   10/19/20 1800 (!) 180/80 -- -- 88 22 93 %   10/19/20 1751 (!) 193/82 99.6 °F (37.6 °C) Temporal -- -- --   10/19/20 1700 (!) 202/88 -- -- 95 25 91 %   10/19/20 1601 -- -- -- 91 -- --   10/19/20 1600 (!) 194/87 -- -- 91 23 (!) 88 %   10/19/20 1552 (!) 205/87 100.7 °F (38.2 °C) Temporal 96 24 91 %   10/19/20 1500 (!) 189/90 -- -- 98 25 92 %   10/19/20 1430 (!) 205/82 -- -- 80 17 --   10/19/20 1401 (!) 159/99 -- -- -- -- --   10/19/20 1400 (!) 92/49 -- -- 79 21 (!) 87 %   10/19/20 1330 (!) 185/79 -- -- 70 19 --   10/19/20 1308 -- -- -- -- 19 97 %   10/19/20 1305 (!) 193/94 -- -- -- -- --   10/19/20 1300 (!) 193/94 -- -- 84 16 96 %   10/19/20 1200 (!) 200/80 -- -- 80 17 97 %   10/19/20 1109 (!) 217/87 98.4 °F (36.9 °C) Temporal 86 18 98 %   10/19/20 1035 (!) 210/102 -- -- 96 17 95 %   10/19/20 1030 (!) 221/105 -- -- 96 18 95 %   10/19/20 1025 (!) 208/100 -- -- 100 17 94 %   10/19/20 1020 (!) 198/100 -- -- 101 22 94 %   10/19/20 1015 (!) 191/101 -- -- 102 19 94 %   10/19/20 1010 (!) 188/97 -- -- 105 24 95 %   10/19/20 1009 (!) 177/98 97.5 °F (36.4 °C) Temporal 103 16 100 %   10/19/20 0921 (!) 191/103 97.7 °F (36.5 °C) Temporal 69 15 99 %   10/19/20 0900 -- -- -- 67 18 95 %   10/19/20 0830 (!) 177/78 98.3 °F (36.8 °C) Temporal -- 20 --       Intake/Output Summary (Last 24 hours) at 10/20/2020 0810  Last data filed at 10/20/2020 0647  Gross per 24 hour   Intake 483 ml   Output --   Net 483 ml       General: no acute distress, sleepy, **  HENT: Atraumatic, normocephalic   Eyes: Normal conjunctiva, Non-incteral sclera. Neck: Supple, JVD not visible. CVS:  Heart sounds are normal. No murmurs. .  RS: Normal respiratory efforts,  Lung fields are diminished at bases . Abd: Soft , bowel sounds are normal, and no tenderness to palpation. Skin: No rash , some bruises,   CNS: Awake Oriented , No focal.   Extremities/MSK: mild Edema, no cyanosis. DATA:  Diagnostic tests reviewed for today's visit:    Recent Labs     10/18/20  0517  10/19/20  0509 10/19/20  1251 10/19/20  1846 10/19/20  2351 10/20/20  0447   WBC 6.7  --   --   --   --   --   --    HCT 36.9*   < > 35.1* 37.2* 35.8* 30.5* 31.7*   *  --   --   --   --   --   --     < > = values in this interval not displayed. Iron Saturation:  No components found for: PERCENTFE  FERRITIN:    Lab Results   Component Value Date    FERRITIN 966 03/02/2015     IRON:    Lab Results   Component Value Date    IRON 46 03/02/2015     TIBC:    Lab Results   Component Value Date    TIBC 203 03/02/2015       Recent Labs     10/18/20  0517 10/19/20  0508 10/20/20  0447    142 139   K 3.5 4.4 3.5    114* 109   CO2 23 19* 21   BUN 29* 44* 30*   CREATININE 1.3 1.2 1.3     Recent Labs     10/18/20  0517 10/19/20  0508 10/20/20  0447   CALCIUM 8.1* 8.0* 8.5   PHOS  --  2.7 2.3*     No results for input(s): PH, PCO2, PO2 in the last 72 hours.     Invalid input(s): Othelia Grieves    ABG:  No results found for: PH, PCO2, PO2, HCO3, BE, THGB, TCO2, O2SAT  VBG:  No results found for: PHVEN, QUB8FUY, BEVEN, [id number]    LDH:  No results found for: LDH  Uric Acid:  No results found for: LABURIC, URICACID    PT/INR: Lab Results   Component Value Date    PROTIME 14.0 10/14/2020    INR 1.20 10/14/2020     Warfarin PT/INR:  No components found for: Lynne Cunha  PTT:    Lab Results   Component Value Date    APTT 31.4 01/22/2016   [APTT}    Last 3 Troponin:    Lab Results   Component Value Date    TROPONINI 0.03 10/15/2020    TROPONINI 0.03 10/14/2020    TROPONINI 0.05 10/14/2020       U/A:    Lab Results   Component Value Date    COLORU YELLOW 10/17/2020    PROTEINU TRACE 10/17/2020    PHUR 5.5 10/17/2020    WBCUA 1 10/17/2020    RBCUA 3 10/17/2020    BACTERIA Rare 02/28/2015    CLARITYU Clear 10/17/2020    SPECGRAV 1.024 10/17/2020    LEUKOCYTESUR Negative 10/17/2020    UROBILINOGEN 0.2 10/17/2020    BILIRUBINUR SMALL 10/17/2020    BLOODU Negative 10/17/2020    GLUCOSEU Negative 10/17/2020     Microalbumen/Creatinine ratio:  No components found for: RUCREAT  24 Hour Urine for Protein:  No components found for: RAWUPRO, UHRS3, JQEL43WJ, UTV3  24 Hour Urine for Creatinine Clearance:  No components found for: CREAT4, UHRS10, UTV10  Urine Toxicology: No components found for: Quiana Baptise, IBENZO, ICOCAINE, IMARTHC, IOPIATES, IPHENCYC    HgBA1c:    Lab Results   Component Value Date    LABA1C 6.4 10/13/2020     RPR:  No results found for: RPR  HIV:  No results found for: HIV  DEANA:    Lab Results   Component Value Date    DEANA Negative 12/26/2017     RF:  No results found for: RF  DSDNA:  No components found for: DNA  AMYLASE:  No results found for: AMYLASE  LIPASE:    Lab Results   Component Value Date    LIPASE 72.0 02/27/2015     Fibrinogen Level:  No components found for: FIB      BELOW MENTIONED RADIOLOGY STUDIES REVIEWED BY ME:    Xr Chest (2 Vw)    Result Date: 10/13/2020  EXAMINATION: TWO XRAY VIEWS OF THE CHEST 10/13/2020 8:54 am COMPARISON: 02/20/2018.   CT angiography of the neck dated 10/13/2020 HISTORY: ORDERING SYSTEM PROVIDED HISTORY: dyspnea TECHNOLOGIST PROVIDED HISTORY: Reason for exam:->dyspnea Reason for Exam: Altered Mental Status (pt brought to ed by [employer] [facility] after wife found him on floor, unsure how long pt confused unable to follow directions and answer questions correctly ) Acuity: Unknown Type of Exam: Unknown FINDINGS: There is mild superior mediastinal widening, but this area is included in the field of view on the previous CT angiography of the neck and presumably related to prominent vasculature. Heart size is mildly prominent. No significant vascular congestion. Patient rotation mildly limits assessment. There is hazy opacity at the medial right lung base and mild volume loss in the right hemithorax, suggesting atelectasis. No evidence of pneumothorax or sizable pleural effusion. Osseous structures are diffusely demineralized. No evidence of acute osseous abnormality. 1.  Superior mediastinal widening, likely due to prominent vasculature, given appearance on CT angiography of the neck performed just prior to this. 2.  Hazy opacity at the right lung base with volume loss in the right hemithorax, suggesting atelectasis. Xr Abdomen (kub) (single Ap View)    Result Date: 10/18/2020  EXAMINATION: ONE SUPINE XRAY VIEW(S) OF THE ABDOMEN 10/18/2020 9:39 pm COMPARISON: Abdominal radiographs 10/15/2020, 07/18/2018 HISTORY: ORDERING SYSTEM PROVIDED HISTORY: pain abdomen TECHNOLOGIST PROVIDED HISTORY: Reason for exam:->pain abdomen Reason for Exam: abd pain Acuity: Unknown Type of Exam: Unknown FINDINGS: Paucity of small bowel gas. Gas and stool in the downstream colon. No significant stool volume. Questionable enlargement of the splenic shadow. Fixation device along the lower lumbar spine. No acute osseous abnormality. No focal airspace disease at the lung bases although some of the small airways seem thickened. Paucity of small bowel gas likely reflects decompressed or fluid-filled loops of bowel. No apparent obstruction. Splenomegaly, correlating with prior imaging.  Basilar airways seem thickened, suggesting inflammation. Xr Abdomen (kub) (single Ap View)    Result Date: 10/15/2020  EXAMINATION: ONE SUPINE XRAY VIEW(S) OF THE ABDOMEN 10/15/2020 11:07 am COMPARISON: CT 12/21/2017 HISTORY: ORDERING SYSTEM PROVIDED HISTORY: abdo pain TECHNOLOGIST PROVIDED HISTORY: Reason for exam:->abdo pain Acuity: Unknown FINDINGS: Nonobstructive bowel gas pattern. Few loops of small bowel measure up to 2.4 cm. Gas is present in the colon. The splenic shadow appears enlarged extending beyond the costal margin. The lung bases are clear. Status post fusion of the L4-5 posterior elements. Calcified atheromatous plaque. 1.  Nonobstructive bowel gas pattern. Mild prominence of small bowel loops may represent mild ileus in the appropriate clinical setting. 2.  Suspect splenomegaly. Ct Head Wo Contrast    Result Date: 10/15/2020  EXAMINATION: CT OF THE HEAD WITHOUT CONTRAST  10/15/2020 10:58 am TECHNIQUE: CT of the head was performed without the administration of intravenous contrast. Dose modulation, iterative reconstruction, and/or weight based adjustment of the mA/kV was utilized to reduce the radiation dose to as low as reasonably achievable. COMPARISON: CT brain MRI 10/13/2020 HISTORY: ORDERING SYSTEM PROVIDED HISTORY: ams recent stroke TECHNOLOGIST PROVIDED HISTORY: Reason for exam:->ams recent stroke Has a \"code stroke\" or \"stroke alert\" been called? ->No Reason for Exam: ams recent stroke Acuity: Unknown Type of Exam: Unknown FINDINGS: BRAIN/VENTRICLES: Ventricles are midline in position and mildly enlarged in size. Moderate periventricular hypodensity is seen. Since prior CT scan there has been continued aging or evolving the left PCA vascular territory infarct. Large area of hypodensity is now seen in this region No obvious hemorrhage Small cavernoma posterior right parietal lobe is seen. . ORBITS: The visualized portion of the orbits demonstrate no acute abnormality.  SINUSES: Fluid is seen in the mastoid air cells. There is bowing and spurring of the nasal septum. Mild mucosal thickening is seen in the maxillary sinuses. SOFT TISSUES/SKULL:  No acute abnormality of the visualized skull or soft tissues. Aging or evolving of known left PCA vascular territory infarct. Large area of hypodensity is now seen in this region. No hemorrhage noted. Underlying atrophy with periventricular and at additional areas of small vessel ischemic change Small cavernoma posterior right parietal lobe     Ct Head Wo Contrast    Result Date: 10/13/2020  EXAMINATION: CT OF THE HEAD WITHOUT CONTRAST  10/13/2020 6:52 am TECHNIQUE: CT of the head was performed without the administration of intravenous contrast. Dose modulation, iterative reconstruction, and/or weight based adjustment of the mA/kV was utilized to reduce the radiation dose to as low as reasonably achievable. COMPARISON: None. HISTORY: ORDERING SYSTEM PROVIDED HISTORY: stroke TECHNOLOGIST PROVIDED HISTORY: Has a \"code stroke\" or \"stroke alert\" been called? ->Yes Reason for exam:->stroke Reason for Exam: stroke Acuity: Acute Type of Exam: Initial Relevant Medical/Surgical History: Altered Mental Status [facility] on floor, unsure how long pt confused unable to follow directions and answer questions correctly ) FINDINGS: BRAIN/VENTRICLES: Ventricles are midline in position and mildly enlarged in size. There is moderate periventricular hypodensity seen. Patchy and confluent hypodensity is seen scattered throughout the frontal and parietal white matter. Posteriorly in the periventricular region on the right there is a nodular area of increased density measuring 7 mm. This appears somewhat tubular in character. There is a bulbous appearance to the middle cerebral artery distally on the right. There is intracranial atherosclerosis. . ORBITS: The visualized portion of the orbits demonstrate no acute abnormality.  SINUSES: Fluid is seen in the mastoid air cells bilaterally. Mild mucosal thickening is seen in the maxillary sinuses. There is streak artifact from dental amalgam. SOFT TISSUES/SKULL:  No acute abnormality of the visualized skull or soft tissues. Posteriorly in the periventricular region on the right there is a nodular area of increased density measuring 7 mm. This appears somewhat tubular in character. .  This could represent a small a cavernoma or venous angioma. However a small area of hemorrhage could also have this appearance. There is a bulbous appearance to the middle cerebral artery distally on the right, either due to tortuous vessel or small aneurysm. Atrophy and small-vessel ischemic change Results discussed with [facility] by Shaista Carrillo. Beth Yip MD at 7:06 am on 10/13/2020     Cta Head Neck W Contrast    Result Date: 10/13/2020  EXAMINATION: CTA OF THE HEAD AND NECK WITH CONTRAST 10/13/2020 6:57 am: TECHNIQUE: CTA of the head and neck was performed with the administration of intravenous contrast. Multiplanar reformatted images are provided for review. MIP images are provided for review. Stenosis of the internal carotid arteries measured using NASCET criteria. Dose modulation, iterative reconstruction, and/or weight based adjustment of the mA/kV was utilized to reduce the radiation dose to as low as reasonably achievable. COMPARISON: CT head October 13, 2020, MRI brain November 14, 2017 HISTORY: ORDERING SYSTEM PROVIDED HISTORY: stroke TECHNOLOGIST PROVIDED HISTORY: Reason for exam:->stroke Reason for Exam: stroke Acuity: Acute Type of Exam: Initial Relevant Medical/Surgical History: Altered Mental Status (pt brought to ed by [place] [facility] after wife found him on floor, unsure how long pt confused unable to follow directions and answer questions correctly ) FINDINGS: CTA NECK: AORTIC ARCH/ARCH VESSELS: No dissection or arterial injury. No significant stenosis of the brachiocephalic or subclavian arteries. CAROTID ARTERIES: There is 70% stenosis at the origin of the right common carotid artery. There is 50% stenosis at the origin of the right internal carotid artery. There is 30% stenosis at the origin of the left internal carotid artery. VERTEBRAL ARTERIES: There is stenosis along the right cervical vertebral artery, most severe at the distal V1 segment with up to 70% stenosis. No acute abnormality or flow stenosis in the cervical portion of the left vertebral artery. SOFT TISSUES: There is moderate emphysema. No cervical or superior mediastinal lymphadenopathy. The larynx and pharynx are unremarkable. No acute abnormality of the salivary glands. There is 1 cm left thyroid nodule, does not require follow-up due to small size. BONES: No acute osseous abnormality. CTA HEAD: ANTERIOR CIRCULATION: There is 75% stenosis at the origin of an M2 branch of the right MCA. No significant stenosis of the intracranial internal carotid, anterior cerebral, or left middle cerebral arteries. No aneurysm. POSTERIOR CIRCULATION: There is partial occlusion in the mid to distal V4 segment of the right vertebral artery. There is up to 40% stenosis in mid V4 segment of the left vertebral artery. There is partial occlusion in the P2 segment of the left PCA. No significant stenosis of the basilar, or right posterior cerebral arteries. No aneurysm. OTHER: No dural venous sinus thrombosis on this non-dedicated study. BRAIN: There is a 7 mm focus of hyperattenuation in the right parietal periventricular white matter, likely corresponding to a cavernoma, stable in size since MRI brain November 14, 2017. There is mild parenchymal volume loss. There is periventricular white matter low attenuation, likely related to mild to moderate chronic microvascular disease. There is mild hydrocephalus, stable. No mass effect or midline shift. No extra-axial fluid collection. The gray-white differentiation is maintained.   There is mild mucosal thickening in the paranasal sinuses. There are moderate bilateral mastoid effusions. Partial occlusion in the mid to distal V4 segment of the right vertebral artery. There is up to 40% stenosis in mid V4 segment of the left vertebral artery. Partial occlusion in the P2 segment of the left PCA. 75% stenosis at the origin of the a M2 branch of the right MCA. 70% stenosis at the origin of the right common carotid artery. 50% stenosis at the origin of the right internal carotid artery. 30% stenosis at the origin of the left internal carotid artery. 70% severe at the distal V1 segment of the right vertebral artery. Results were reported to Dr. Audra Gooden at 4:07 p.m. on October 13, 2020. Mri Brain Wo Contrast    Result Date: 10/13/2020  EXAMINATION: MRI OF THE BRAIN WITHOUT CONTRAST  10/13/2020 6:16 pm TECHNIQUE: Multiplanar multisequence MRI of the brain was performed without the administration of intravenous contrast. COMPARISON: None. HISTORY: ORDERING SYSTEM PROVIDED HISTORY: cva TECHNOLOGIST PROVIDED HISTORY: Reason for exam:->cva Reason for Exam: cva disorientation aphasia Acuity: Acute Type of Exam: Unknown FINDINGS: INTRACRANIAL STRUCTURES/VENTRICLES: There is an acute infarct within the left occipitotemporal lobe, extending into the ipsilateral thalamus. This measures at least 9 cm in diameter. No mass, shift, or acute intracranial bleed is identified. There is volume loss with mild-to-moderate chronic white matter microvascular ischemic change. Normal expected signal voids are present within the vessels at the base of skull. A focus of susceptibility artifact adjacent to the right lateral ventricle posteriorly on image number 23 of series 8 could reflect a small cavernoma. ORBITS: The visualized portion of the orbits demonstrate no acute abnormality. SINUSES: There is a moderate to severe right mastoid effusion.  BONES/SOFT TISSUES: The bone marrow signal intensity appears normal. The soft tissues demonstrate no acute abnormality. Large acute left PCA vascular territory infarct. The findings were sent to the Radiology Results [address] at 7:07 pm on 10/13/2020to be communicated to a licensed caregiver.

## 2020-10-20 NOTE — FLOWSHEET NOTE
Assessment complete. VSS, see flowsheet. Medications administered and side effects discussed, see MAR. Pt tolerating room air. Pt denies needs, appears comfortable. Pt has call light in reach. Bed in lowest position, wheels locked, and bed alarm on. No visible needs at this time. Will continue to monitor.     Electronically signed by Kapil Ledezma RN BSN

## 2020-10-20 NOTE — PROGRESS NOTES
Hospitalist Progress Note      PCP: Aly Benjamin    Date of Admission: 10/13/2020    Chief Complaint: Confusion and right-sided weakness    Hospital Course:   Alert, disoriented  No acute event overnight  Blood pressure is acceptable this morning  He is off nicardipine drip  Had GI bleed and went to EGD  Patient denies chest pain or shortness of breath  Right-sided weakness    Medications:  Reviewed      Exam:    BP (!) 193/82   Pulse 95   Temp 99.6 °F (37.6 °C) (Temporal)   Resp 25   Ht 6' (1.829 m)   Wt 181 lb 8 oz (82.3 kg)   SpO2 91%   BMI 24.62 kg/m²     General appearance: Drowsy opens eyes to commands  HEENT: Pupils equal, round, and reactive to light. Conjunctivae/corneas clear. Neck: Supple, with full range of motion. No jugular venous distention. Trachea midline. Respiratory:  Normal respiratory effort. Clear to auscultation, bilaterally without RALES/WHEEZES/Rhonchi. Cardiovascular: Regular rate and rhythm with normal S1/S2 without MURMURS, rubs or gallops. Abdomen: Soft, non-tender, non-distended with normal bowel sounds. Musculoskeletal: No clubbing, cyanosis or EDEMA bilaterally. Full range of motion without deformity. Skin: Skin color, texture, turgor normal.  No rashes or lesions. Neurologic: Unable to do visual field testing  Right hemiplegia power is 1/5 on the right side  Some dysarthria  receptive aphasia  Awake alert   Unable to answer any of my orientation questions  Still has some significant right-sided neglect        Labs:   Recent Labs     10/17/20  0516 10/18/20  0517  10/19/20  0509 10/19/20  1251 10/19/20  1846   WBC 8.9 6.7  --   --   --   --    HGB 13.6 12.8*   < > 11.5* 12.8* 12.3*   HCT 39.4* 36.9*   < > 35.1* 37.2* 35.8*   * 125*  --   --   --   --     < > = values in this interval not displayed.      Recent Labs     10/17/20  0516 10/18/20  0517 10/19/20  0508    137 142   K 3.5 3.5 4.4    105 114*   CO2 23 23 19*   BUN 39* 29* 44* CREATININE 1.6* 1.3 1.2   CALCIUM 8.7 8.1* 8.0*   PHOS  --   --  2.7     No results for input(s): AST, ALT, BILIDIR, BILITOT, ALKPHOS in the last 72 hours. No results for input(s): INR in the last 72 hours. Recent Labs     10/18/20  0517   CKTOTAL 80       Urinalysis:      Lab Results   Component Value Date    NITRU Negative 10/17/2020    WBCUA 1 10/17/2020    BACTERIA Rare 02/28/2015    RBCUA 3 10/17/2020    BLOODU Negative 10/17/2020    SPECGRAV 1.024 10/17/2020    GLUCOSEU Negative 10/17/2020       Radiology:  XR ABDOMEN (KUB) (SINGLE AP VIEW)   Final Result   Paucity of small bowel gas likely reflects decompressed or fluid-filled loops   of bowel. No apparent obstruction. Splenomegaly, correlating with prior imaging. Basilar airways seem thickened, suggesting inflammation. XR ABDOMEN (KUB) (SINGLE AP VIEW)   Final Result   1. Nonobstructive bowel gas pattern. Mild prominence of small bowel loops   may represent mild ileus in the appropriate clinical setting. 2.  Suspect splenomegaly. CT HEAD WO CONTRAST   Final Result   Aging or evolving of known left PCA vascular territory infarct. Large area   of hypodensity is now seen in this region. No hemorrhage noted. Underlying atrophy with periventricular and at additional areas of small   vessel ischemic change      Small cavernoma posterior right parietal lobe         MRI BRAIN WO CONTRAST   Final Result   Large acute left PCA vascular territory infarct. The findings were sent to the Radiology Results [address] at 7:07   pm on 10/13/2020to be communicated to a licensed caregiver. XR CHEST (2 VW)   Final Result   1. Superior mediastinal widening, likely due to prominent vasculature, given   appearance on CT angiography of the neck performed just prior to this. 2.  Hazy opacity at the right lung base with volume loss in the right   hemithorax, suggesting atelectasis.          CTA HEAD NECK W CONTRAST   Final Result   Partial occlusion in the mid to distal V4 segment of the right vertebral   artery. There is up to 40% stenosis in mid V4 segment of the left vertebral   artery. Partial occlusion in the P2 segment of the left PCA. 75% stenosis at the origin of the a M2 branch of the right MCA. 70% stenosis at the origin of the right common carotid artery. 50% stenosis at the origin of the right internal carotid artery. 30% stenosis at the origin of the left internal carotid artery. 70% severe at the distal V1 segment of the right vertebral artery. Results were reported to Dr. Chelsea Macedo at 4:07 p.m. on October 13, 2020. CT HEAD WO CONTRAST   Final Result   Posteriorly in the periventricular region on the right there is a nodular   area of increased density measuring 7 mm. This appears somewhat tubular in   character. .  This could represent a small a cavernoma or venous angioma. However a small area of hemorrhage could also have this appearance. There is a bulbous appearance to the middle cerebral artery distally on the   right, either due to tortuous vessel or small aneurysm. Atrophy and small-vessel ischemic change      Results discussed with [facility] by Fili Asher.  Mayra Singh MD at 7:06 am on   10/13/2020             Assessment/Plan:    Active Hospital Problems    Diagnosis Date Noted    Acute CVA (cerebrovascular accident) [facility]) [I63.9] 10/13/2020     acute right hemiparesis and acute encephalopathy secondary to left sided infarct  MRI with infarct in the posterior circulation  CT angio of head and neck showed multiple intracranial stenoses  On aspirin and Plavix, A1c 6.4  Echo normal, no PFO  Patient will need 30-day Holter monitor  Continue telemetry    Acute kidney injury strongly suspect secondary to the blood pressure fluctuations  For now we will hold valsartan  Nephrology consulted, appreciate recommendation    Delirium   multifactorial, delirium

## 2020-10-20 NOTE — PROGRESS NOTES
Speech  Language And Dysphagia Treatment Note    Name: Giuliana Limon  : 1945  Medical Diagnosis: Acute CVA (cerebrovascular accident) ([facility] [address] [I63.9]  Acute CVA (cerebrovascular accident) ([facility] [address] [I63.9]  Treatment Diagnosis: Mild Oropharyngeal Dysphagia, Expressive Aphasia; Receptive Aphasia; Suspected Cognitive-Linguistic Deficits  Suspected Apraxia of speech  Pain: Pt did not report pain    MRI Brain (10/13/20):      Impression    Large acute left PCA vascular territory infarct.       Chest Xray (10/13/20): Impression    1.  Superior mediastinal widening, likely due to prominent vasculature, given    appearance on CT angiography of the neck performed just prior to this.         2.  Hazy opacity at the right lung base with volume loss in the right    hemithorax, suggesting atelectasis. 10/19/2020 XR Abdomen   Impression    Paucity of small bowel gas likely reflects decompressed or fluid-filled loops    of bowel.  No apparent obstruction.         Splenomegaly, correlating with prior imaging.         Basilar airways seem thickened, suggesting inflammation. Patient's response to therapy:  Pt was sleeping heavily during session, Pt able to be awakened however would quickly fall back asleep. Pt's daughter was present and reported Pt was more alert on  when she was with him. Dysphagia Treatment:  Current Diet Level: Clear Liquid Diet  Tolerance of Current Diet Level: Per RN report, Pt tolerated clear liquids however did have difficulty swallowing medication crushed in applesauce. Assessment of Texture Tolerance:  -Impressions: Pt was provided with trials of thin liquids and jello as he is currently on a clear liquid diet. Thin liquids via cup revealed intermittent suspected premature bolus loss to pharynx, delayed swallow initiation was noted. No overt clinical s/s of aspiration/penetration were assessed.  Jello trials revealed prolonged mastication with adequate oral clearance responses     3. Pt will improve verbal expression of personal hx to 80% given min cues (ongoing 10/20/2020)    -Decreased verbal initiation noted     4. Pt will participate in further assessment of speech-language/cognition as able with goals to be added as clinically indicated (ongoing 10/20/2020)    -Pt was noted to close one eye (varied on which eye) when attempting to focus on objects and speaker   -Significant visual deficits and right in-attention/neglect   -Disoriented to all aspects this date    Patient/Family Education:Education given to the Pt/daughter and nurse, who verbalized understanding    Assessment: Patient progressing toward goals    Plan: Continue as per plan of care    Discharge Recommendations: Pt will benefit from continued skilled Speech Therapy for Speech and Dysphagia services, prior to returning home. Treatment time  Timed Code Treatment Minutes: 12 minutes  Total Treatment time: 38 minutes    If patient discharges prior to next session this note will serve as a discharge summary.       Thomas Hurst M.A., [address] [place] [address]  Speech-Language Pathologist

## 2020-10-20 NOTE — PROGRESS NOTES
Occupational Therapy  Facility/Department: [facility] ICU  Daily Treatment Note  NAME: Rehana Vergara  : 1945  MRN: [id number]    Date of Service: 10/20/2020    Discharge Recommendations:      Rehana Vergara scored a 7/24 on the AM-PAC ADL Inpatient form. Current research shows that an AM-PAC score of 17 or less is typically not associated with a discharge to the patient's home setting. Based on the patient's AM-PAC score and their current ADL deficits, it is recommended that the patient have 5-7 sessions per week of Occupational Therapy at d/c to increase the patient's independence. At this time, this patient demonstrates the endurance, and/or tolerance for 3 hours of therapy each day, with a treatment frequency of 5-7x/wk. Please see assessment section for further patient specific details. If patient discharges prior to next session this note will serve as a discharge summary. Please see below for the latest assessment towards goals. OT Equipment Recommendations  Equipment Needed: No  Other: continue to assess as pt progresses    Assessment   Performance deficits / Impairments: Decreased functional mobility ; Decreased balance;Decreased ADL status; Decreased endurance;Decreased high-level IADLs;Decreased cognition;Decreased vision/visual deficit; Decreased coordination;Decreased strength;Decreased sensation;Decreased fine motor control  Assessment: Patient presents well below baseline d/t above deficits; OT indicated to maximize safety/independence with ADL and IADL  Treatment Diagnosis: Above deficits associated with CVA  Prognosis: Good  Decision Making: High Complexity  OT Education: OT Role;Plan of Care;Family Education;Orientation  Patient Education: Pt is not an independent learner-will require reinforcement for carryover  REQUIRES OT FOLLOW UP: Yes  Activity Tolerance  Activity Tolerance: Treatment limited secondary to decreased cognition;Patient limited by fatigue;Treatment limited secondary to agitation  Safety Devices  Safety Devices in place: Yes  Type of devices: All fall risk precautions in place; Bed alarm in place;Call light within reach; Patient at risk for falls; Left in bed;Nurse notified         Patient Diagnosis(es): The primary encounter diagnosis was Disorientation. Diagnoses of Secondary hypertension, Chest pain, unspecified type, and Cerebral infarction due to bilateral stenosis of vertebral arteries ([facility] [address] were also pertinent to this visit. has a past medical history of Acute MI ([address] CAD (coronary artery disease), Hyperlipidemia, Hypertension, and Type II or unspecified type diabetes mellitus without mention of complication, not stated as uncontrolled. has a past surgical history that includes Coronary angioplasty with stent (2000, 2/24/2012); Hand surgery (5/09); ESOPHAGOSCOPY; back surgery (2018); Hand surgery (Left, 2009); Cardiac surgery; and Upper gastrointestinal endoscopy (N/A, 10/19/2020). Restrictions  Restrictions/Precautions  Restrictions/Precautions: (high fall risk, dys soft and bite sized, thin liquids)  Required Braces or Orthoses?: No  Position Activity Restriction  Other position/activity restrictions: Addy Yee is a 76 y.o. male who presents to the emergency department fall. This morning wife noted that he was on the ground and she called EMS because she was unable to get them up. She states that over the last 48 hours he has been feeling unwell. She did not notice any significant weakness or confusion but states that he has been slower than usual over the last 48 hours. MRI showed \"Large acute left PCA vascular territory infarct\"  Subjective   General  Chart Reviewed:  Yes  Additional Pertinent Hx: HTN  Response to previous treatment: Patient unable to report, no changes reported from family or staff  Family / Caregiver Present: Yes(Daughter)  Diagnosis: CVA  Subjective  Subjective: Pt lying supine in bed upon arrival. Disoriented x4  Vital Signs  Patient Currently in Pain: No   Orientation  Orientation  Overall Orientation Status: Impaired(States, \"Yes or Okay,\" when therapist offers name. Unable to state name, , place, time or situation on command. Identity verified through wrist bracelet.)  Objective    ADL  LE Bathing: Dependent/Total  UE Dressing: Dependent/Total(gown change)  LE Dressing: Dependent/Total  Toileting: Dependent/Total  Additional Comments: Pt incontinent of urine in brief, total A for brief change/brice care, completed supine in bed        Balance  Sitting Balance: Dependent/Total(dependent of 2-sat EOB ~10 minutes, pt presents with severe R lateral/posterior lean, unable to achieve midline seated with max verbal and tactile cues and assist of 2)  Standing Balance: Unable to assess(comment)  Bed mobility  Rolling to Left: Dependent/Total;2 Person assistance  Rolling to Right: Dependent/Total;2 Person assistance  Supine to Sit: Dependent/Total;2 Person assistance  Scootin Person assistance;Dependent/Total  Comment: severe inability to follow commands this date requiring dependent assist of 2  Transfers  Transfer Comments: Unsafe to assess this date secondary to inability to follow commands, severe inattention, R side neglect, expressive/receptive aphasia, and R side weakness        Cognition  Overall Cognitive Status: Exceptions  Arousal/Alertness: Inconsistent responses to stimuli  Following Commands: Does not follow commands  Attention Span: Unable to maintain attention(Decreased overall attention during session)  Safety Judgement: Decreased awareness of need for safety;Decreased awareness of need for assistance  Problem Solving: Decreased awareness of errors  Insights: Not aware of deficits  Initiation: Requires cues for all  Sequencing: Requires cues for all  Cognition Comment: Easily frustrated with verbalization.      Perception  Overall Perceptual Status: Impaired  Unilateral Attention: Cues to attend right visual field;Cues to maintain midline in sitting;Cues to attend to right side of body  Motor Planning: Hand over hand to sequence tasks    Plan   Plan  Times per week: 5-7  Times per day: Daily  Current Treatment Recommendations: Strengthening, Endurance Training, Balance Training, Functional Mobility Training, Safety Education & Training, Equipment Evaluation, Education, & procurement, Self-Care / ADL, Patient/Caregiver Education & Training    AM-PAC Score        AM-PAC Inpatient Daily Activity Raw Score: 7 (10/20/20 1245)  AM-PAC Inpatient ADL T-Scale Score : 20.13 (10/20/20 1245)  ADL Inpatient CMS 0-100% Score: 92.44 (10/20/20 1245)  ADL Inpatient CMS G-Code Modifier : CM (10/20/20 1245)    Goals  Short term goals  Time Frame for Short term goals: discharge  Short term goal 1: UB ADL mod-dependent 10/20  Short term goal 2: LB ADL mod A-dependent 10/20  Short term goal 3: Fxl transfer max A-Max of 2 10/17,ongoing 10/20  Short term goal 4: Increase sitting balance to SBA for fxl task, ADL-Max A 10/17, dependent of 2 10/20  Short term goal 5: Self-feeding min A-ongoing 10/17,ongoing 10/20  Short term goal 6: Grooming mod A-Max A 10/17, ongoing 10/20  Long term goals  Time Frame for Long term goals : LTG=STG       Therapy Time   Individual Concurrent Group Co-treatment   Time In       1143   Time Out       1236   Minutes       53      Timed Code Treatment Minutes:  53 Minutes  Total Treatment Minutes:  [address], [address]  I have reviewed and agree with this treatment session.     Morgan Guerrero, OTR/L [id number]

## 2020-10-20 NOTE — PROGRESS NOTES
Hospitalist Progress Note      PCP: Jamarcus Tuttle    Date of Admission: 10/13/2020    Chief Complaint: Confusion and right-sided weakness    Hospital Course:   76 y.o. male with PMHx significant for hypertension, coronary artery disease s/p stents hyperlipidemia, balance issues, mild cognitive impairment mated to the hospital after he had a fall and acting abnormally for the last 2 to 3 days  History obtained from patient and wife although patient does not seem to remember much and is not able to give that much of a detailed history  Wife called EMS this morning as patient was noted to be on the ground unclear how he ended up there. She states over the last 48 hours he has not been feeling well  She noticed that he is weak generally but more on the right side and very confused  This morning patient was at the mental status that he has been for the last 2 days with no worsening  Patient denies any headache  Wife has not noticed any facial distortions however as noted the last 2 days he keeps asking the same question does not seem to remember much and has been acting very different since Sunday  She did he denied any chest pain or shortness of breath  No headache  EMS reported that there was a right upper extremity drift in the picked him up at around 6:15 AM  He has not been compliant with his medication    Subjective  He moving better today but he is more confused and disoriented. No acute event overnight  Blood pressure is acceptable this morning  He is off nicardipine drip  Had GI bleed and went to EGD  Patient denies chest pain or shortness of breath  Right-sided weakness from recent stroke    Medications:  Reviewed      Exam:    BP (!) 153/54   Pulse 65   Temp 99.2 °F (37.3 °C) (Temporal)   Resp 19   Ht 6' (1.829 m)   Wt 181 lb 8 oz (82.3 kg)   SpO2 96%   BMI 24.62 kg/m²     General appearance: Drowsy opens eyes to commands  HEENT: Pupils equal, round, and reactive to light. Conjunctivae/corneas clear. Neck: Supple, with full range of motion. No jugular venous distention. Trachea midline. Respiratory:  Normal respiratory effort. Clear to auscultation, bilaterally without RALES/WHEEZES/Rhonchi. Cardiovascular: Regular rate and rhythm with normal S1/S2 without MURMURS, rubs or gallops. Abdomen: Soft, non-tender, non-distended with normal bowel sounds. Musculoskeletal: No clubbing, cyanosis or EDEMA bilaterally. Full range of motion without deformity. Skin: Skin color, texture, turgor normal.  No rashes or lesions. Neurologic: Unable to do visual field testing  Right hemiplegia power is 1/5 on the right side  Some dysarthria  receptive aphasia  Awake alert   Unable to answer any of my orientation questions  Still has some significant right-sided neglect        Labs:   Recent Labs     10/18/20  0517  10/19/20  2351 10/20/20  0447 10/20/20  1256   WBC 6.7  --   --   --   --    HGB 12.8*   < > 10.4* 11.1* 10.5*   HCT 36.9*   < > 30.5* 31.7* 30.4*   *  --   --   --   --     < > = values in this interval not displayed. Recent Labs     10/18/20  0517 10/19/20  0508 10/20/20  0447    142 139   K 3.5 4.4 3.5    114* 109   CO2 23 19* 21   BUN 29* 44* 30*   CREATININE 1.3 1.2 1.3   CALCIUM 8.1* 8.0* 8.5   PHOS  --  2.7 2.3*     No results for input(s): AST, ALT, BILIDIR, BILITOT, ALKPHOS in the last 72 hours. No results for input(s): INR in the last 72 hours. Recent Labs     10/18/20  0517   CKTOTAL 80       Urinalysis:      Lab Results   Component Value Date    NITRU Negative 10/17/2020    WBCUA 1 10/17/2020    BACTERIA Rare 02/28/2015    RBCUA 3 10/17/2020    BLOODU Negative 10/17/2020    SPECGRAV 1.024 10/17/2020    GLUCOSEU Negative 10/17/2020       Radiology:  XR ABDOMEN (KUB) (SINGLE AP VIEW)   Final Result   Paucity of small bowel gas likely reflects decompressed or fluid-filled loops   of bowel. No apparent obstruction.       Splenomegaly, correlating with prior imaging. Basilar airways seem thickened, suggesting inflammation. XR ABDOMEN (KUB) (SINGLE AP VIEW)   Final Result   1. Nonobstructive bowel gas pattern. Mild prominence of small bowel loops   may represent mild ileus in the appropriate clinical setting. 2.  Suspect splenomegaly. CT HEAD WO CONTRAST   Final Result   Aging or evolving of known left PCA vascular territory infarct. Large area   of hypodensity is now seen in this region. No hemorrhage noted. Underlying atrophy with periventricular and at additional areas of small   vessel ischemic change      Small cavernoma posterior right parietal lobe         MRI BRAIN WO CONTRAST   Final Result   Large acute left PCA vascular territory infarct. The findings were sent to the Radiology Results [address] at 7:07   pm on 10/13/2020to be communicated to a licensed caregiver. XR CHEST (2 VW)   Final Result   1. Superior mediastinal widening, likely due to prominent vasculature, given   appearance on CT angiography of the neck performed just prior to this. 2.  Hazy opacity at the right lung base with volume loss in the right   hemithorax, suggesting atelectasis. CTA HEAD NECK W CONTRAST   Final Result   Partial occlusion in the mid to distal V4 segment of the right vertebral   artery. There is up to 40% stenosis in mid V4 segment of the left vertebral   artery. Partial occlusion in the P2 segment of the left PCA. 75% stenosis at the origin of the a M2 branch of the right MCA. 70% stenosis at the origin of the right common carotid artery. 50% stenosis at the origin of the right internal carotid artery. 30% stenosis at the origin of the left internal carotid artery. 70% severe at the distal V1 segment of the right vertebral artery. Results were reported to Dr. Austin Lorenzo at 4:07 p.m. on October 13, 2020.          CT HEAD WO CONTRAST   Final Result   Posteriorly in the periventricular region on the right there is a nodular   area of increased density measuring 7 mm. This appears somewhat tubular in   character. .  This could represent a small a cavernoma or venous angioma. However a small area of hemorrhage could also have this appearance. There is a bulbous appearance to the middle cerebral artery distally on the   right, either due to tortuous vessel or small aneurysm. Atrophy and small-vessel ischemic change      Results discussed with [facility] by Kelly Cortes. Zelpha Habermann, MD at 7:06 am on   10/13/2020             Assessment/Plan:    Active Hospital Problems    Diagnosis Date Noted    Acute CVA (ce[facility]brovascular accident) [facility]) [I63.9] 10/13/2020     acute right hemiparesis and acute encephalopathy secondary to left sided infarct  MRI with infarct in the posterior circulation  CT angio of head and neck showed multiple intracranial stenoses  On aspirin and Plavix, A1c 6.4  Echo normal, no PFO  Patient will need 30-day Holter monitor  Continue telemetry    Acute kidney injury strongly suspect secondary to the blood pressure fluctuations  For now we will hold valsartan  Nephrology consulted, appreciate recommendation    Delirium   multifactorial, delirium precautions    Hypertension malignant with endorgan damage  Continue labetalol, amlodipine. Continue to dysphagia diet  Continue hydralazine  Nephrology is following    GI bleed/ acute blood loss anemia    EGD   Ulcerative esophagitis with clot and diffuse oozing from ulceration. No V.V. Controlled with epi injection.                  Large hiatal hernia               2 clean based ulcers in the bulb  - monitor H and H  IV PPI gtt       Elevated troponin  EKG shows some new T inversions in V3 and V4 as compared to previous EKG  He does not have any chest pain  Troponins more or less flat  Seen by cardiology  On aspirin and Plavix  On Lipitor full dose    Coronary  artery disease  On aspirin and Plavix  - on hold    Prediabetes  Start Metformin on discharge    L superior mediastinal widening on chest x-ray  His radial pulses are equal more importantly on the CT angiogram of the neck the chest x-ray report acknowledges that that area was included on the CT angiogram and was visualized and showed only prominent vasculature ends at this time will not pursue any further testing    Seen by physical medicine and rehab for ARU placement if blood pressure remained stable over the next 24 hours can be discharged on Monday    DVT Prophylaxis: sc lovenox   Diet: DIET FULL LIQUID; No Drinking Straw  Code Status: Full Code      Dispo - once acute medical processes have resolved, evaluated for rehab    Shalom Cole MD

## 2020-10-20 NOTE — PLAN OF CARE
Nutrition Problem #1: Predicted inadequate energy intake  Intervention: Food and/or Nutrient Delivery: Continue Current Diet(Start ONS once diet adv)  Nutritional Goals: Consume >50% of meals and ONS

## 2020-10-20 NOTE — PROGRESS NOTES
NEUROLOGY FOLLOWUP    HISTORY OF PRESENT ILLNESS :     Meliton Geiger is a 76 y.o. male   History was obtained from the dictations in the chart. Patient had some GI bleed and hematemesis 2 days ago. Patient had an EGD  which showed bleeding esophagitis. Patient is now on IV Protonix and his antiplatelet therapy has been stopped temporarily. Patient has been found to be more lethargic. His ammonia level was found to be over 100. Detailed history:  Apparently patient has severe hypertension but has not been taking his medications for more than 6 months. He developed some confusion 2 days ago. Later he developed right-sided weakness and had speech difficulty. He was unable to talk and had difficulty with comprehension as well. Onset of symptoms was fairly acute and abrupt and moderately severe. No clear aggravating or relieving factors.   Patient has never had any similar symptoms in the past.  Comorbidities include coronary artery disease hypertension hyperlipidemia and type 2 diabetes    REVIEW OF SYSTEMS       Constitutional:  []   Chills   [x]  Fatigue   []  Fevers   []  Malaise   []  Weight loss     [] Denies all of the above    Respiratory:   []  Cough    []  Shortness of breath         [x] Denies all of the above     Cardiovascular:   []  Chest pain    []  Exertional chest pressure/discomfort           [] Palpitations    []  Syncope     [x] Denies all of the above    Past Medical History:   Diagnosis Date    Acute MI ([facility] [address] 2000    CAD (coronary artery disease)     Hyperlipidemia     Hypertension     Type II or unspecified type diabetes mellitus without mention of complication, not stated as uncontrolled     diet controlled     Family History   Problem Relation Age of Onset    Heart Disease Father     High Blood Pressure Father     High Cholesterol Father     Heart Disease Maternal Grandfather     right side. Coordination is impaired on the right side and normal on the left. Reflexes absent on the right and 1+ on the left. Right plantar reflexes extensor rest the left is flexor. Gait impaired. No carotid bruit. No neck stiffness.     DATA :  LABS:  General Labs:    CBC:   Lab Results   Component Value Date    WBC 6.7 10/18/2020    RBC 4.19 10/18/2020    HGB 10.5 10/20/2020    HCT 30.4 10/20/2020    MCV 88.0 10/18/2020    MCH 30.5 10/18/2020    MCHC 34.6 10/18/2020    RDW 13.8 10/18/2020     10/18/2020    MPV 8.4 10/18/2020     BMP:    Lab Results   Component Value Date     10/20/2020    K 3.5 10/20/2020    K 3.9 10/13/2020     10/20/2020    CO2 21 10/20/2020    BUN 30 10/20/2020    LABALBU 3.0 10/20/2020    CREATININE 1.3 10/20/2020    CALCIUM 8.5 10/20/2020    GFRAA >60 10/20/2020    GFRAA >60 02/25/2012    LABGLOM 54 10/20/2020    GLUCOSE 125 10/20/2020     RADIOLOGY REVIEW:  I have reviewed radiology image(s) and reports(s) of: MRI brain      IMPRESSION :  Acute left posterior cerebral artery infarction  Global aphasia with both expression and comprehension difficulties  Right hemiparesis  Ataxia  Hypertension   Hyperlipidemia  CT angiogram showed intracranial stenosis involving the posterior circulation  Echocardiogram showed left ventricular hypertrophy  GI bleed, status post EGD  Patient is now off antiplatelet therapy  Increased lethargy probably related to increased ammonia level with hepatic encephalopathy  Patient Active Problem List   Diagnosis    Essential hypertension, benign    Coronary atherosclerosis of native coronary artery    Palpitations    Coronary artery disease due to lipid rich plaque    Abnormal transaminases    Diverticulitis    Unstable angina (HCC)    Midline thoracic back pain    Mixed hyperlipidemia    Bilateral carotid artery stenosis    Acute CVA (cerebrovascular accident) ([facility] [address]       RECOMMENDATIONS :  Lengthy discussion with patient's wife and daughter at the bedside  Antiplatelet therapy will be restarted after a few days when okay with GI  Statin therapy  Patient is currently on lactulose to try and get the ammonia level down  Physical therapy and Occupational Therapy evaluation  Patient may need inpatient rehabilitation unit when he is medically more stable  High risk patient because of acute stroke and multiple other comorbidities              Please note a portion of this chart was generated using dragon dictation software. Although every effort was made to ensure the accuracy of this automated transcription, some errors in transcription may have occurred.          Laila Flor M.D.

## 2020-10-20 NOTE — PROGRESS NOTES
Comprehensive Nutrition Assessment    Type and Reason for Visit:  Reassess    Nutrition Recommendations/Plan:   1. (Once diet advances) Begin Glucerna TID  2. Document meal and supplement intakes in flowsheet  3. Encourage PO intake    Nutrition Assessment:  Pt remains at risk for nutrition compromise AEB poor PO intake since admission seven days ago. Pt was on dyphagia soft and bite sized diet per SLP but was coughing and vomitting blood. Pt currently on clear liquid diet d/t bleeding ulcerative esophagitis. PO intake is still minimal. Precert pending for inpatient ARU. Will send Glucerna TID once diet advances. Malnutrition Assessment:  Malnutrition Status: At risk for malnutrition (Comment)    Context:  Acute Illness     Findings of the 6 clinical characteristics of malnutrition:  Energy Intake:  7 - 50% or less of estimated energy requirements for 5 or more days  Weight Loss:  No significant weight loss     Body Fat Loss:  No significant body fat loss     Muscle Mass Loss:  No significant muscle mass loss    Fluid Accumulation:  No significant fluid accumulation     Strength:  Not Performed    Estimated Daily Nutrient Needs:  Energy (kcal):  6820-0334 kcal (25-28 kcal/kg); Weight Used for Energy Requirements:  Current(Cont to use 82kg)     Protein (g):  82-98g (1-1.2 g/kg); Weight Used for Protein Requirements:  Current        Fluid (ml/day):  1 ml/kcal; Weight Used for Fluid Requirements:  Current      Nutrition Related Findings:  Oriented to person only. No edema noted. Glucose 125. Wounds:  None       Anthropometric Measures:  · Height: 6' (182.9 cm)  · Current Body Weight: 181 lb (82.1 kg)   · Admission Body Weight: 189 lb (85.7 kg)(actual)    · Usual Body Weight: 201 lb (91.2 kg)(per EMR)     · Ideal Body Weight: 178 lbs; % Ideal Body Weight 101.7 %   · BMI: 24.5  · Adjusted Body Weight:  ; No Adjustment   · BMI Categories: Normal Weight (BMI 18.5-24. 9)       Nutrition Diagnosis:

## 2020-10-20 NOTE — PROGRESS NOTES
[facility] GI  Gastroenterology Progress Note    Radha Mcgowan is a 76 y.o. male patient. Active Problems:    Acute CVA (cerebrovascular accident) [facility])  Resolved Problems:    * No resolved hospital problems. *      SUBJECTIVE:  No further vomiting. ROS:  Unable to obtain. Pt aphasic. Physical    VITALS:  BP (!) 142/58   Pulse 66   Temp 99.2 °F (37.3 °C) (Temporal)   Resp 18   Ht 6' (1.829 m)   Wt 181 lb 8 oz (82.3 kg)   SpO2 93%   BMI 24.62 kg/m²   TEMPERATURE:  Current - Temp: 99.2 °F (37.3 °C); Max - Temp  Av.2 °F (37.3 °C)  Min: 97.5 °F (36.4 °C)  Max: 100.7 °F (38.2 °C)    NAD  Regular rate   Lungs CTA Bilaterally  Abdomen soft, ND, NT,  Bowel sounds normal.    Data    Data Review:    Recent Labs     10/18/20  0517  10/19/20  1846 10/19/20  2351 10/20/20  0447   WBC 6.7  --   --   --   --    HGB 12.8*   < > 12.3* 10.4* 11.1*   HCT 36.9*   < > 35.8* 30.5* 31.7*   MCV 88.0  --   --   --   --    *  --   --   --   --     < > = values in this interval not displayed. Recent Labs     10/18/20  0517 10/19/20  0508 10/20/20  0447    142 139   K 3.5 4.4 3.5    114* 109   CO2 23 19* 21   PHOS  --  2.7 2.3*   BUN 29* 44* 30*   CREATININE 1.3 1.2 1.3     No results for input(s): AST, ALT, ALB, BILIDIR, BILITOT, ALKPHOS in the last 72 hours. No results for input(s): LIPASE, AMYLASE in the last 72 hours. No results for input(s): PROTIME, INR in the last 72 hours. No results for input(s): PTT in the last 72 hours. ASSESSMENT :    Hematemesis, ulcerative esophagitis - EGD 10/19 with ulcerative esophagitis with clot and diffuse oozing from ulceration, no V. V. Treated with epi. Large hiatal hernia. 2 clean based ulcers in the bulb. Anemia - due to above. hgb stable at 11. CVA - plavix held d/t GI bleed.        PLAN :  - IV PPI  - keep HOB elevated at 35 degrees  - hold anticoagulation another 1-2 days  - repeat EGD in 8 weeks    Discussed with Dr. Martin Rubio, BRAULIO  [address] [facility]      I have personally performed a face to face diagnostic evaluation on this patient. I have interviewed and examined the patient and I agree with the findings and recommended plan of care. In summary, my findings and plan are the following: hematemesis and mild blood loss anemia.  hgb stable at 11. EGD c/w severe ulcerative esophagitis with clot, oozing. Treated with epi. Ab is nt. Will plan to cont ppi long term, head elevated after meals x 3 hr. May restart plavix in 2 more days.         Carlitos Curtis MD  [address]

## 2020-10-20 NOTE — PLAN OF CARE
Problem: Falls - Risk of:  Goal: Will remain free from falls  Description: Will remain free from falls  Outcome: Ongoing  Goal: Absence of physical injury  Description: Absence of physical injury  Outcome: Ongoing     Problem: Skin Integrity:  Goal: Will show no infection signs and symptoms  Description: Will show no infection signs and symptoms  Outcome: Ongoing  Goal: Absence of new skin breakdown  Description: Absence of new skin breakdown  Outcome: Ongoing     Problem: Confusion - Acute:  Goal: Absence of continued neurological deterioration signs and symptoms  Description: Absence of continued neurological deterioration signs and symptoms  Outcome: Ongoing  Goal: Mental status will be restored to baseline  Description: Mental status will be restored to baseline  Outcome: Ongoing     Problem: Discharge Planning:  Goal: Ability to perform activities of daily living will improve  Description: Ability to perform activities of daily living will improve  Outcome: Ongoing  Goal: Participates in care planning  Description: Participates in care planning  Outcome: Ongoing  Goal: Discharged to appropriate level of care  Description: Discharged to appropriate level of care  Outcome: Ongoing     Problem: Injury - Risk of, Physical Injury:  Goal: Will remain free from falls  Description: Will remain free from falls  Outcome: Ongoing  Goal: Absence of physical injury  Description: Absence of physical injury  Outcome: Ongoing     Problem: Mood - Altered:  Goal: Mood stable  Description: Mood stable  Outcome: Ongoing  Goal: Absence of abusive behavior  Description: Absence of abusive behavior  Outcome: Ongoing  Goal: Verbalizations of feeling emotionally comfortable while being cared for will increase  Description: Verbalizations of feeling emotionally comfortable while being cared for will increase  Outcome: Ongoing

## 2020-10-20 NOTE — PROGRESS NOTES
setting. Precert initiated and pending. Currently not medically appropriate for transfer. We will continue to follow. Wanda Galeano MD 10/20/2020, 10:17 AM    * This document was created using dictation software. While all precautions were taken to ensure accuracy, errors may have occurred. Please disregard any typographical errors.

## 2020-10-20 NOTE — PROGRESS NOTES
Physical Therapy  Facility/Department: [facility] ICU  Daily Treatment Note  NAME: Justine Adler  : 1945  MRN: [id number]    Date of Service: 10/20/2020    Discharge Recommendations:  Justine Adler scored a 6/24 on the AM-PAC short mobility form. Current research shows that an AM-PAC score of 17 or less is typically not associated with a discharge to the patient's home setting. Based on the patient's AM-PAC score and their current functional mobility deficits, it is recommended that the patient have 5-7 sessions per week of Physical Therapy at d/c to increase the patient's independence. At this time, this patient demonstrates the endurance, and/or tolerance for 3 hours of therapy each day, with a treatment frequency of 5-7x/wk. Please see assessment section for further patient specific details. If patient discharges prior to next session this note will serve as a discharge summary. Please see below for the latest assessment towards goals. PT Equipment Recommendations  Equipment Needed: No    Assessment   Body structures, Functions, Activity limitations: Decreased functional mobility ; Decreased strength;Decreased endurance;Decreased safe awareness;Decreased sensation;Decreased ROM; Decreased balance;Decreased posture  Assessment: Pt currently Dep of 2 for all mobility, displaying a significant R posterior lateral lean in sitting. Pt does become agitated at times, answering questions somewhat agressively. Pt is well below baseline and will benefit from continued skilled therapy to facilitate imrpoved overall functional mobility.   Treatment Diagnosis: decreased functional mobility, impaired gait, decreased balance, weakness s/p CVA  Prognosis: Good  Decision Making: High Complexity  Clinical Presentation: unpredictable  Barriers to Learning: language, physical  REQUIRES PT FOLLOW UP: Yes  Activity Tolerance  Activity Tolerance: Treatment limited secondary to agitation;Patient limited by cognitive status  Activity Tolerance: Pt becoming frustrated with some movement and questions, answering agressively. Patient Diagnosis(es): The primary encounter diagnosis was Disorientation. Diagnoses of Secondary hypertension, Chest pain, unspecified type, and Cerebral infarction due to bilateral stenosis of vertebral arteries ([facility] [address] were also pertinent to this visit. has a past medical history of Acute MI ([facility] [address] CAD (coronary artery disease), Hyperlipidemia, Hypertension, and Type II or unspecified type diabetes mellitus without mention of complication, not stated as uncontrolled. has a past surgical history that includes Coronary angioplasty with stent (2000, 2/24/2012); Hand surgery (5/09); ESOPHAGOSCOPY; back surgery (2018); Hand surgery (Left, 2009); Cardiac surgery; and Upper gastrointestinal endoscopy (N/A, 10/19/2020). Restrictions  Restrictions/Precautions  Restrictions/Precautions: (high fall risk, dys soft and bite sized, thin liquids)  Required Braces or Orthoses?: No  Position Activity Restriction  Other position/activity restrictions: William Avilez is a 76 y.o. male who presents to the emergency department fall. This morning wife noted that he was on the ground and she called EMS because she was unable to get them up. She states that over the last 48 hours he has been feeling unwell. She did not notice any significant weakness or confusion but states that he has been slower than usual over the last 48 hours. MRI showed \"Large acute left PCA vascular territory infarct\"  Subjective   General  Chart Reviewed: Yes  Family / Caregiver Present: Yes(daughter)  Subjective  Subjective: Pt only answering simple yes/no questions inconsistently. Pt with global aphasia, difficult to understand at times. General Comment  Comments: Pt supine in bed upon arrival. RN approval prior to entering.   Pain Screening  Patient Currently in Pain: Denies  Vital Signs  Patient Currently in Pain: Denies Orientation  Orientation  Overall Orientation Status: Impaired  Orientation Level: Disoriented X4  Cognition      Objective   Bed mobility  Rolling to Left: Dependent/Total;2 Person assistance  Rolling to Right: Dependent/Total;2 Person assistance  Supine to Sit: Dependent/Total;2 Person assistance  Sit to Supine: 2 Person assistance;Dependent/Total  Scootin Person assistance;Dependent/Total           Balance  Posture: Fair  Sitting - Static: Poor  Comments: Upon entering pt room, pt incontinent and soiled in urine. Rolling completed Depx2 for pericare and brief mangement. Pt Depx2 for sitting balance EOB. Pt sat ~10 minutes Depx2 with significant R posterior lateral lean, unattending to R side. Pt with difficulty following commands, unable to correct posture and unaware of deficits. Attempted LUE placement on bed for improved support, however pt locking elbow and pushing to R.                           G-Code     OutComes Score                                                     AM-PAC Score  AM-PAC Inpatient Mobility Raw Score : 6 (10/20/20 1257)  AM-PAC Inpatient T-Scale Score : 23.55 (10/20/20 1257)  Mobility Inpatient CMS 0-100% Score: 100 (10/20/20 1257)  Mobility Inpatient CMS G-Code Modifier : CN (10/20/20 1257)          Goals  Short term goals  Time Frame for Short term goals:  To be met prior to discharge  Short term goal 1: Bed mobility with max A  Short term goal 2: Sit EOB 3-5 minutes with SBA  Short term goal 3: Sit to/from stand with max A  Short term goal 4: Bed to/from chair with max A  Patient Goals   Patient goals : unable to state   *no goals met    Plan    Plan  Times per week: 5-7  Times per day: Daily  Current Treatment Recommendations: Strengthening, ROM, Balance Training, Transfer Training, Gait Training, Safety Education & Training, Patient/Caregiver Education & Training  Safety Devices  Type of devices: Left in bed, Bed alarm in place, Call light within reach, Nurse notified  Restraints  Initially in place: No     Therapy Time   Individual Concurrent Group Co-treatment   Time In       1143   Time Out       [address], [place]    I agree with the above note. Goals addressed by PT.   Madhu Nicole, [facility] [place], [place] [address]

## 2020-10-20 NOTE — FLOWSHEET NOTE
No acute changes noted on reassessment, see flowsheets. VSS and afebrile. Denies additional needs. Will continue to monitor.  Kera Cullen, RN, BSN

## 2020-10-20 NOTE — CARE COORDINATION
Prior authorization request for ARU submitted to insurance 10/20. Please call with any questions.      Thank you,     Tomas Andersen RN Clinical liaison ARU  [phone]

## 2020-10-20 NOTE — FLOWSHEET NOTE
No acute changes noted on reassessment, see flowsheets. VSS and afebrile. Pt continues to be lethargic and minimally interactive/responsive. Denies additional needs. Will continue to monitor.  Graciela Fofana RN, BSN

## 2020-10-20 NOTE — PROGRESS NOTES
Patient was calm and cooperative, incontinent of urine, brief is on. BP controlled with meds. Patient is able to turn to right better.

## 2020-10-21 NOTE — PROGRESS NOTES
Speech  Language And Dysphagia Treatment Note    Name: Dedrick Mckinley  : 1945  Medical Diagnosis: Acute CVA (cerebrovascular accident) ([facility] [address] [I63.9]  Acute CVA (cerebrovascular accident) ([facility] [address] [I63.9]  Treatment Diagnosis: Mild Oropharyngeal Dysphagia, Expressive Aphasia; Receptive Aphasia; Suspected Cognitive-Linguistic Deficits  Suspected Apraxia of speech  Pain: Pt did not report pain    New SLP eval and treat orders received today. Pt currently on caseload. MRI Brain (10/13/20):      Impression    Large acute left PCA vascular territory infarct.       Chest Xray (10/13/20): Impression    1.  Superior mediastinal widening, likely due to prominent vasculature, given    appearance on CT angiography of the neck performed just prior to this.         2.  Hazy opacity at the right lung base with volume loss in the right    hemithorax, suggesting atelectasis. 10/19/2020 XR Abdomen   Impression    Paucity of small bowel gas likely reflects decompressed or fluid-filled loops    of bowel.  No apparent obstruction.         Splenomegaly, correlating with prior imaging.         Basilar airways seem thickened, suggesting inflammation. Patient's response to therapy:  Pt was positioned upright in recliner chair with daughter at bedside upon arrival.  Pt alert and with intermittently appropriate verbal responses. Dysphagia Treatment:  Current Diet Level: NPO per nursing  Tolerance of Current Diet Level: Per RN report, pt was holding liquids and jello in oral cavity and would not swallow or expectorate. Pt made NPO until seen by speech therapy. Assessment of Texture Tolerance:  -Impressions: Pt positioned upright in recliner chair. Trials of thin liquid and applesauce provided as pt is on a clear liquid diet per GI. Thin liquids via cup revealed intermittent suspected rapid premature bolus loss to pharynx with delayed swallow initiation. Pt refused jello.   Per daughter, pt does not like jello. Applesauce ok per nursing. Applesauce revealed prolonged mastication with adequate oral clearance achieved. No overt clinical s/s of aspiration/penetration assessed with any texture trials. Pt is at increased risk of aspiration due to reduced attention to task. At this time, recommend continuation of current diet with ongoing use of aspiration precautions. Ensure pt is alert and attentive for safe PO intake. Diet and Treatment Recommendations:  1.) Continue full liquid diet  2.) If difficulty is noted, recommend downgrade to NPO with ongoing swallowing assessment   3.) Assistance with feeding     Dysphagia Goals, addressed this date:  1.) Pt will tolerate recommended diet without s/s of aspiration (ongoing 10/21/2020)   2.) If clinical symptoms of penetration/aspiration continue to be noted,Pt will tolerate MBS to r/o aspiration and determine appropriate diet/liquid level. (ongoing 10/21/2020)   3.) Pt will tolerate diet advance to least restricted diet, as clinically indicated, with no signs of aspiration (ongoing 10/21/2020)   4.) Pt will improve oral motor function via bolus control exercises  (ongoing 10/21/2020)     Speech Language Treatment:  Impressions: Pt required consistent verbal cues to maintain alertness this date. Speech Language STG, addressed this date: 1. Pt will improve basic naming skills (automatics, confrontational naming, responsive naming) to 80% given min cues (ongoing 10/21/2020)    -Unable to state his first/last name this date, despite max attempts and choices   -Unable to state his daughters name; able to repeat   -Basic object namin/3 despite semantic/phonemic cues   -Automatics:    -count 1-10: unable despite max cues and models    -YU: unable despite max cues and models    2. Pt will improve auditory processing/comprehension of commands and questions to 80%, via graded tasks (ongoing 10/21/2020)    -One step commands: 0/4   -Basic yes/no questions: 2/5

## 2020-10-21 NOTE — CARE COORDINATION
Authorization for ARU placement obtained. ARU able to admit patient tomorrow per Dr. Moises Evans if goals met. Please call with any questions.      Thank you,   Thanh Russell RN Clinical liaison [facility]  [phone]

## 2020-10-21 NOTE — PROGRESS NOTES
Noon reassessment complete. Patient less confused this afternoon, wife at bedside. Patient denies pain. Scheduled hydralazine given for high BP and current BP is 121/89. Will continue to monitor.

## 2020-10-21 NOTE — PROGRESS NOTES
[facility]  Gastroenterology Progress Note  Eliezer Mullins is a 76 y.o. male patient. 1. Disorientation    2. Secondary hypertension    3. Chest pain, unspecified type    4. Cerebral infarction due to bilateral stenosis of vertebral arteries (HCC)        SUBJECTIVE:  Pt has no complaints but is confused some. Physical    VITALS:  BP (!) 183/75   Pulse 70   Temp 97.9 °F (36.6 °C)   Resp 14   Ht 6' (1.829 m)   Wt 187 lb 13.3 oz (85.2 kg)   SpO2 91%   BMI 25.47 kg/m²   TEMPERATURE:  Current - Temp: 97.9 °F (36.6 °C); Max - Temp  Av.6 °F (37 °C)  Min: 97.9 °F (36.6 °C)  Max: 99.2 °F (37.3 °C)    Abdomen soft, ND, NT, no HSM, Bowel sounds normal     Data      Recent Labs     10/20/20  1817 10/21/20  0056 10/21/20  0503   HGB 10.8* 10.2* 10.8*   HCT 31.1* 27.7* 30.8*     Recent Labs     10/19/20  0508 10/20/20  0447 10/21/20  0503    139 141   K 4.4 3.5 3.2*   * 109 110   CO2 19* 21 21   PHOS 2.7 2.3* 3.0   BUN 44* 30* 20   CREATININE 1.2 1.3 1.2     No results for input(s): AST, ALT, ALB, BILIDIR, BILITOT, ALKPHOS in the last 72 hours. No results for input(s): LIPASE, AMYLASE in the last 72 hours. ASSESSMENT   1. Hematemesis, ulcerative esophagitis - EGD 10/19 with ulcerative esophagitis with clot and diffuse oozing from ulceration, no V. V. Treated with epi. Large hiatal hernia. 2 clean based ulcers in the bulb. 2. Anemia - due to above. hgb stable at 10.8  3. CVA - plavix held d/t GI bleed. PLAN    1. PPI could change to po   2. Keep head elevated  3. Ok to start anticoag tomorrow  4. Repeat EGD in 8 wks. 5. Will sign off.       Hossein Jeffries MD  [address]

## 2020-10-21 NOTE — PROGRESS NOTES
NEUROLOGY FOLLOWUP    HISTORY OF PRESENT ILLNESS :     Park Finnegan is a 76 y.o. male   History was obtained from the dictations in the chart. Patient had some GI bleed and hematemesis 3 days ago. Patient had an EGD  which showed bleeding esophagitis. Patient is now on IV Protonix and his antiplatelet therapy has been stopped temporarily. Patient's ammonia level seems to be much improved and normal today. Patient's mental status is also improved today. Detailed history:  Apparently patient has severe hypertension but has not been taking his medications for more than 6 months. He developed some confusion 2 days ago. Later he developed right-sided weakness and had speech difficulty. He was unable to talk and had difficulty with comprehension as well. Onset of symptoms was fairly acute and abrupt and moderately severe. No clear aggravating or relieving factors.   Patient has never had any similar symptoms in the past.  Comorbidities include coronary artery disease hypertension hyperlipidemia and type 2 diabetes    REVIEW OF SYSTEMS       Constitutional:  []   Chills   [x]  Fatigue   []  Fevers   []  Malaise   []  Weight loss     [] Denies all of the above    Respiratory:   []  Cough    []  Shortness of breath         [x] Denies all of the above     Cardiovascular:   []  Chest pain    []  Exertional chest pressure/discomfort           [] Palpitations    []  Syncope     [x] Denies all of the above    Past Medical History:   Diagnosis Date    Acute MI ([facility] [address] 2000    CAD (coronary artery disease)     Hyperlipidemia     Hypertension     Type II or unspecified type diabetes mellitus without mention of complication, not stated as uncontrolled     diet controlled     Family History   Problem Relation Age of Onset    Heart Disease Father     High Blood Pressure Father     High Cholesterol Father     Heart Disease Maternal Grandfather     Heart Disease Paternal Grandfather      Social History     Socioeconomic History    Marital status:      Spouse name: None    Number of children: None    Years of education: None    Highest education level: None   Occupational History    None   Social Needs    Financial resource strain: None    Food insecurity     Worry: None     Inability: None    Transportation needs     Medical: None     Non-medical: None   Tobacco Use    Smoking status: Former Smoker     Packs/day: 0.00     Years: 20.00     Pack years: 0.00     Last attempt to quit: 1982     Years since quittin.6    Smokeless tobacco: Never Used   Substance and Sexual Activity    Alcohol use: Yes     Alcohol/week: 6.0 standard drinks     Types: 6 Cans of beer per week     Comment: Occ    Drug use: No    Sexual activity: None   Lifestyle    Physical activity     Days per week: None     Minutes per session: None    Stress: None   Relationships    Social connections     Talks on phone: None     Gets together: None     Attends [employer] service: None     Active member of club or organization: None     Attends meetings of clubs or organizations: None     Relationship status: None    Intimate partner violence     Fear of current or ex partner: None     Emotionally abused: None     Physically abused: None     Forced sexual activity: None   Other Topics Concern    None   Social History Narrative    None        PHYSICAL EXAMINATION      BP (S) 121/85   Pulse 66   Temp 97.2 °F (36.2 °C) (Temporal)   Resp 17   Ht 6' (1.829 m)   Wt 187 lb 13.3 oz (85.2 kg)   SpO2 94%   BMI 25.47 kg/m²   This is a well-nourished patient in no acute distress  Patient is awake and alert but he has expressive aphasia. Visual field examination shows a right homonymous hemianopsia. Mild right facial droop. Tongue is in the midline. Motor examination shows right hemiplegia with a strength of 1-2 over 5.   Tone was diminished on the right side. Coordination is impaired on the right side and normal on the left. Reflexes absent on the right and 1+ on the left. Right plantar reflexes extensor rest the left is flexor. Gait impaired. No carotid bruit. No neck stiffness. DATA :  LABS:  General Labs:    CBC:   Lab Results   Component Value Date    WBC 6.7 10/18/2020    RBC 4.19 10/18/2020    HGB 11.0 10/21/2020    HCT 31.3 10/21/2020    MCV 88.0 10/18/2020    MCH 30.5 10/18/2020    MCHC 34.6 10/18/2020    RDW 13.8 10/18/2020     10/18/2020    MPV 8.4 10/18/2020     BMP:    Lab Results   Component Value Date     10/21/2020    K 3.2 10/21/2020    K 3.9 10/13/2020     10/21/2020    CO2 21 10/21/2020    BUN 20 10/21/2020    LABALBU 3.0 10/21/2020    CREATININE 1.2 10/21/2020    CALCIUM 8.4 10/21/2020    GFRAA >60 10/21/2020    GFRAA >60 02/25/2012    LABGLOM 59 10/21/2020    GLUCOSE 137 10/21/2020     RADIOLOGY REVIEW:  I have reviewed radiology image(s) and reports(s) of: MRI brain      IMPRESSION :  Acute left posterior cerebral artery infarction  Global aphasia with both expression and comprehension difficulties  Right hemiparesis  Ataxia  Hypertension   Hyperlipidemia  CT angiogram showed intracranial stenosis involving the posterior circulation  Echocardiogram showed left ventricular hypertrophy  GI bleed, status post EGD  Patient is now off antiplatelet therapy  Ammonia level is now improved  Patient Active Problem List   Diagnosis    Essential hypertension, benign    Coronary atherosclerosis of native coronary artery    Palpitations    Coronary artery disease due to lipid rich plaque    Abnormal transaminases    Diverticulitis    Unstable angina (HCC)    Midline thoracic back pain    Mixed hyperlipidemia    Bilateral carotid artery stenosis    Acute CVA (cerebrovascular accident) ([facility] [address]       RECOMMENDATIONS :  Discussed at length with patient's wife at the bedside.     Antiplatelet therapy will be restarted after a few days when okay with GI  Statin therapy  Patient is currently on lactulose to try and get the ammonia level down  Physical therapy and Occupational Therapy evaluation  Patient may need inpatient rehabilitation unit when he is medically more stable  High risk patient because of acute stroke and multiple other comorbidities              Please note a portion of this chart was generated using dragon dictation software. Although every effort was made to ensure the accuracy of this automated transcription, some errors in transcription may have occurred.          Sonya Vargas M.D.

## 2020-10-21 NOTE — PROGRESS NOTES
Occupational Therapy  Facility/Department: [facility] ICU  Daily Treatment Note  NAME: Gay Haider  : 1945  MRN: [id number]    Date of Service: 10/21/2020    Discharge Recommendations:      Gay Haider scored a 7/24 on the AM-PAC ADL Inpatient form. Current research shows that an AM-PAC score of 17 or less is typically not associated with a discharge to the patient's home setting. Based on the patient's AM-PAC score and their current ADL deficits, it is recommended that the patient have 5-7 sessions per week of Occupational Therapy at d/c to increase the patient's independence. At this time, this patient demonstrates the endurance, and/or tolerance for 3 hours of therapy each day, with a treatment frequency of 5-7x/wk. Please see assessment section for further patient specific details. If patient discharges prior to next session this note will serve as a discharge summary. Please see below for the latest assessment towards goals. OT Equipment Recommendations  Equipment Needed: No  Other: continue to assess as pt progresses    Assessment   Performance deficits / Impairments: Decreased functional mobility ; Decreased balance;Decreased ADL status; Decreased endurance;Decreased high-level IADLs;Decreased cognition;Decreased vision/visual deficit; Decreased coordination;Decreased strength;Decreased sensation;Decreased fine motor control  Assessment: Patient presents well below baseline d/t above deficits; OT indicated to maximize safety/independence with ADL and IADL  Treatment Diagnosis: Above deficits associated with CVA  Prognosis: Good  Decision Making: High Complexity  OT Education: OT Role;Plan of Care;Family Education;Orientation  Patient Education: Pt is not an independent learner-will require reinforcement for carryover  REQUIRES OT FOLLOW UP: Yes  Activity Tolerance  Activity Tolerance: Treatment limited secondary to decreased cognition;Patient limited by fatigue;Treatment limited secondary to agitation  Safety Devices  Safety Devices in place: Yes  Type of devices: All fall risk precautions in place;Call light within reach; Chair alarm in place;Gait belt;Patient at risk for falls; Left in chair;Nurse notified         Patient Diagnosis(es): The primary encounter diagnosis was Disorientation. Diagnoses of Secondary hypertension, Chest pain, unspecified type, and Cerebral infarction due to bilateral stenosis of vertebral arteries ([address] were also pertinent to this visit. has a past medical history of Acute MI ([address] CAD (coronary artery disease), Hyperlipidemia, Hypertension, and Type II or unspecified type diabetes mellitus without mention of complication, not stated as uncontrolled. has a past surgical history that includes Coronary angioplasty with stent (2000, 2/24/2012); Hand surgery (5/09); ESOPHAGOSCOPY; back surgery (2018); Hand surgery (Left, 2009); Cardiac surgery; and Upper gastrointestinal endoscopy (N/A, 10/19/2020). Restrictions  Restrictions/Precautions  Restrictions/Precautions: Fall Risk, Modified Diet(high fall risk, NPO)  Required Braces or Orthoses?: No  Position Activity Restriction  Other position/activity restrictions: Radha Mcgowan is a 76 y.o. male who presents to the emergency department fall. This morning wife noted that he was on the ground and she called EMS because she was unable to get them up. She states that over the last 48 hours he has been feeling unwell. She did not notice any significant weakness or confusion but states that he has been slower than usual over the last 48 hours. MRI showed \"Large acute left PCA vascular territory infarct\"  Subjective   General  Chart Reviewed:  Yes  Additional Pertinent Hx: HTN  Response to previous treatment: Patient unable to report, no changes reported from family or staff  Family / Caregiver Present: Yes(Daughter)  Diagnosis: CVA  Subjective  Subjective: Pt lying supine in bed upon arrival. Disoriented x4, RN stating Erum Walker is having pain everywhere\"  General Comment  Comments: Per RN, okay to see patient      Orientation  Orientation  Overall Orientation Status: Impaired(States, \"Yes or Okay,\" when therapist offers name. Unable to state name, , place, time or situation on command.  Identity verified through wrist bracelet.)  Objective    ADL  LE Dressing: Dependent/Total(doning socks)  Toileting: None(henry catheter present)  Additional Comments: ADLs completed dependently prior to arrival by nursing staff        Balance  Sitting Balance: Dependent/Total(max-dependent assist of 1, unable to achieve midline seated with max verbal and tactile cues)  Standing Balance: Unable to assess(comment)  Standing Balance  Time: ~10 sec  Activity: Transfer  Bed mobility  Rolling to Left: Dependent/Total;2 Person assistance  Supine to Sit: Dependent/Total;2 Person assistance  Scootin Person assistance;Dependent/Total  Comment: Max A of 2  Transfers  Stand Pivot Transfers: Dependent/Total;2 Person assistance(Max A of 2, EOB > recliner chair)  Sit to stand: Dependent/Total;2 Person assistance  Stand to sit: 2 Person assistance;Dependent/Total     Cognition  Overall Cognitive Status: Exceptions  Arousal/Alertness: Inconsistent responses to stimuli  Following Commands: Does not follow commands  Attention Span: Difficulty dividing attention(increased attention this date, difficulty to assess d/t expressive/receptive aphasia)  Safety Judgement: Decreased awareness of need for safety;Decreased awareness of need for assistance  Problem Solving: Decreased awareness of errors  Insights: Not aware of deficits  Initiation: Requires cues for all  Sequencing: Requires cues for all  Cognition Comment: Easily frustrated with verbalization, slightly agigated throughout session     Perception  Overall Perceptual Status: Impaired  Unilateral Attention: Cues to attend right visual field;Cues to maintain midline in sitting;Cues to attend to right side of body;Cues to maintain midline in standing          Plan   Plan  Times per week: 5-7  Times per day: Daily  Current Treatment Recommendations: Strengthening, Endurance Training, Balance Training, Functional Mobility Training, Safety Education & Training, Equipment Evaluation, Education, & procurement, Self-Care / ADL, Patient/Caregiver Education & Training    AM-PAC Score        AM-PAC Inpatient Daily Activity Raw Score: 7 (10/21/20 0957)  AM-PAC Inpatient ADL T-Scale Score : 20.13 (10/21/20 0957)  ADL Inpatient CMS 0-100% Score: 92.44 (10/21/20 0957)  ADL Inpatient CMS G-Code Modifier : CM (10/21/20 0957)    Goals  Short term goals  Time Frame for Short term goals: discharge  Short term goal 1: UB ADL mod A-dependent 10/20, ongoing 10/21  Short term goal 2: LB ADL mod A-dependent 10/20, ongoing 10/21  Short term goal 3: Fxl transfer max A-Max of 2 10/17,ongoing 10/20, Max A of 2 SPT 10/21  Short term goal 4:  Increase sitting balance to SBA for fxl task, ADL-Max A 10/17, dependent of 2 10/20, Max-dependent of 1 10/21  Short term goal 5: Self-feeding min A-NPO 10/21  Short term goal 6: Grooming mod A-Max A 10/17, ongoing 10/21  Long term goals  Time Frame for Long term goals : LTG=STG       Therapy Time   Individual Concurrent Group Co-treatment   Time In       0905   Time Out       0948   Minutes       43      Timed Code Treatment Minutes:  43 Minutes  Total Treatment Minutes:  Logan Gamble, [address] [place] [address]

## 2020-10-21 NOTE — PROGRESS NOTES
Morning assessment complete. On observation, it was noted patient pulled out IV. Applied pressure to site, will attempt IV stick at a later time. Patient appears confused this morning and is not following commands. Patient noted to be unable to swallow, made NPO at this time and obtained a speech consult. Patient is AOE x1, only alert to self. Lungs sound clear over diminished throughout the lobes. No adventitious lungs sounds heard. Heart sounds are normal. Pulses are palapable and skin is warm and dry. Extremities on the right side is noted to have some deficits, while life side is full strength. Patient's henry is intact and draining freely. Call light within reach, safety precautions in place. Will continue to monitor.

## 2020-10-21 NOTE — PROGRESS NOTES
Patient is alert but not following commands, speech is slightly slurred and sentences do not make sense.  Somewhat agitated when asking him to follow commands, pulling at lines and tubing

## 2020-10-21 NOTE — PROGRESS NOTES
IV access obtained. IV Protonix restarted at this time. IV Labetalol given for BP of 189/ 88. Will continue to monitor.

## 2020-10-21 NOTE — PROGRESS NOTES
Physical Therapy  Facility/Department: [facility] ICU  Daily Treatment Note  NAME: Giuliana Limon  : 1945  MRN: [id number]    Date of Service: 10/21/2020    Discharge Recommendations:  Giuliana Limon scored a 6/24 on the AM-PAC short mobility form. Current research shows that an AM-PAC score of 17 or less is typically not associated with a discharge to the patient's home setting. Based on the patient's AM-PAC score and their current functional mobility deficits, it is recommended that the patient have 5-7 sessions per week of Physical Therapy at d/c to increase the patient's independence. At this time, this patient demonstrates the endurance, and/or tolerance for 3 hours of therapy each day, with a treatment frequency of 5-7x/wk. Please see assessment section for further patient specific details. If patient discharges prior to next session this note will serve as a discharge summary. Please see below for the latest assessment towards goals. PT Equipment Recommendations  Equipment Needed: No    Assessment   Body structures, Functions, Activity limitations: Decreased functional mobility ; Decreased strength;Decreased endurance;Decreased safe awareness;Decreased sensation;Decreased ROM; Decreased balance;Decreased posture  Assessment: Pt currently Dependent for all mobility, displaying a moderate R posterior lateral lean in sitting. Pt does become frustrated at times, and persevates on being left alone. Overal tolerance for therapy increased significantly this date. Pt is well below baseline and will benefit from continued skilled therapy to facilitate imrpoved overall functional mobility.   Treatment Diagnosis: decreased functional mobility, impaired gait, decreased balance, weakness s/p CVA  Prognosis: Good  Decision Making: High Complexity  Exam: AM-PAC, strength, mobility  Clinical Presentation: unpredictable  PT Education: Plan of Care;Goals;PT Role;Orientation  Patient Education: provided increased tactile input to RUE/RLE, ways to increase attention to R environment, d/c recommendations - spouse/daughter verbalized understanding (patient unable to verbalize understanding due to aphasia)  Barriers to Learning: language, physical  REQUIRES PT FOLLOW UP: Yes  Activity Tolerance  Activity Tolerance: Patient limited by cognitive status; Patient Tolerated treatment well  Activity Tolerance: Pt becoming frustrated with some movement and questions but no physical conflict occured. Patient Diagnosis(es): The primary encounter diagnosis was Disorientation. Diagnoses of Secondary hypertension, Chest pain, unspecified type, and Cerebral infarction due to bilateral stenosis of vertebral arteries ([address] were also pertinent to this visit. has a past medical history of Acute MI ([address] CAD (coronary artery disease), Hyperlipidemia, Hypertension, and Type II or unspecified type diabetes mellitus without mention of complication, not stated as uncontrolled. has a past surgical history that includes Coronary angioplasty with stent (2000, 2/24/2012); Hand surgery (5/09); ESOPHAGOSCOPY; back surgery (2018); Hand surgery (Left, 2009); Cardiac surgery; and Upper gastrointestinal endoscopy (N/A, 10/19/2020). Restrictions  Restrictions/Precautions  Restrictions/Precautions: (high fall risk, dys soft and bite sized, thin liquids)  Required Braces or Orthoses?: No  Position Activity Restriction  Other position/activity restrictions: Mony Vega is a 76 y.o. male who presents to the emergency department fall. This morning wife noted that he was on the ground and she called EMS because she was unable to get them up. She states that over the last 48 hours he has been feeling unwell. She did not notice any significant weakness or confusion but states that he has been slower than usual over the last 48 hours.  MRI showed \"Large acute left PCA vascular territory infarct\"     Subjective   General  Chart Reviewed: Yes  Response To Previous Treatment: Patient with no complaints from previous session. Family / Caregiver Present: Yes(daughter)  Subjective  Subjective: Pt with global aphasia, difficult to understand at times. Completed short senteces and fragments. Reports feeling pain all over but unable able to say how much pain. General Comment  Comments: Pt supine in bed upon arrival. RN approval prior to entering after initial hold. Pain Screening  Patient Currently in Pain: Yes  Vital Signs  Patient Currently in Pain: Yes       Orientation  Orientation  Orientation Level: Oriented to person;Disoriented to place; Disoriented to time;Disoriented to situation  Cognition      Objective   Bed mobility  Rolling to Left: Dependent/Total;2 Person assistance(Max A of 2)  Supine to Sit: Dependent/Total;2 Person assistance(Max A of 2)  Scootin Person assistance;Dependent/Total(Max A of 2)  Transfers  Stand Pivot Transfers: Dependent/Total;2 Person Assistance(Max A of 2)  Ambulation  Ambulation?: No  Stairs/Curb  Stairs?: No     Balance  Posture: Fair  Sitting - Static: Fair;-  Sitting - Dynamic: Poor  Standing - Static: Poor  Standing - Dynamic: Poor;-  Comments: Sat EOB Dep of 1 for ~20 min for core stabilizatin and BUE reaching. Educated patient on benefits of therapy and held a more dynamic conversation despite perseverating on \"I want to be left alone. \"            Comment: Pt positioned in chair for comfort and supported with pillows. G-Code     OutComes Score                                                     AM-PAC Score  AM-PAC Inpatient Mobility Raw Score : 6 (10/21/20 0956)  AM-PAC Inpatient T-Scale Score : 23.55 (10/21/20 0956)  Mobility Inpatient CMS 0-100% Score: 100 (10/21/20 0956)  Mobility Inpatient CMS G-Code Modifier : CN (10/21/20 5666)          Goals  Short term goals  Time Frame for Short term goals:  To be met prior to discharge  Short term goal 1: Bed mobility with max A  Short term goal 2: Sit EOB 3-5 minutes with SBA  Short term goal 3: Sit to/from stand with max A  Short term goal 4: Bed to/from chair with max A  Patient Goals   Patient goals : unable to state  NO GOALS MET THIS DATE    Plan    Plan  Times per week: 5-7  Times per day: Daily  Current Treatment Recommendations: Strengthening, ROM, Balance Training, Transfer Training, Gait Training, Safety Education & Training, Patient/Caregiver Education & Training  Safety Devices  Type of devices: Call light within reach, Nurse notified, All fall risk precautions in place, Chair alarm in place, Left in chair  Restraints  Initially in place: No     Therapy Time   Individual Concurrent Group Co-treatment   Time In       0905   Time Out       0948   Minutes       43   Timed Code Treatment Minutes: [address], [address]      I agree with the note above. Goals addressed by PT this session.    [address], [place] [id number]

## 2020-10-21 NOTE — PROGRESS NOTES
Afternoon assessment complete, VSS. Patient was helped back to bed with lift equipment. . During this time, Patient was noted to have a large bowel movement, refer to [facility] for details. Patient denies pain at this time.  Call light within reach, safety precautions in place

## 2020-10-21 NOTE — PROGRESS NOTES
Supple, with full range of motion. No jugular venous distention. Trachea midline. Respiratory:  Normal respiratory effort. Clear to auscultation, bilaterally without RALES/WHEEZES/Rhonchi. Cardiovascular: Regular rate and rhythm with normal S1/S2 without MURMURS, rubs or gallops. Abdomen: Soft, non-tender, non-distended with normal bowel sounds. Musculoskeletal: No clubbing, cyanosis or EDEMA bilaterally. Full range of motion without deformity. Skin: Skin color, texture, turgor normal.  No rashes or lesions. Neurologic: Unable to do visual field testing  Right hemiplegia power is 1/5 on the right side  Some dysarthria  receptive aphasia  Awake alert   Unable to answer any of my orientation questions  Still has some significant right-sided neglect        Labs:   Recent Labs     10/21/20  0056 10/21/20  0503 10/21/20  1140   HGB 10.2* 10.8* 11.0*   HCT 27.7* 30.8* 31.3*     Recent Labs     10/19/20  0508 10/20/20  0447 10/21/20  0503    139 141   K 4.4 3.5 3.2*   * 109 110   CO2 19* 21 21   BUN 44* 30* 20   CREATININE 1.2 1.3 1.2   CALCIUM 8.0* 8.5 8.4   PHOS 2.7 2.3* 3.0     No results for input(s): AST, ALT, BILIDIR, BILITOT, ALKPHOS in the last 72 hours. No results for input(s): INR in the last 72 hours. No results for input(s): Joyice Currie in the last 72 hours. Urinalysis:      Lab Results   Component Value Date    NITRU Negative 10/17/2020    WBCUA 1 10/17/2020    BACTERIA Rare 02/28/2015    RBCUA 3 10/17/2020    BLOODU Negative 10/17/2020    SPECGRAV 1.024 10/17/2020    GLUCOSEU Negative 10/17/2020       Radiology:  XR ABDOMEN (KUB) (SINGLE AP VIEW)   Final Result   Paucity of small bowel gas likely reflects decompressed or fluid-filled loops   of bowel. No apparent obstruction. Splenomegaly, correlating with prior imaging. Basilar airways seem thickened, suggesting inflammation. XR ABDOMEN (KUB) (SINGLE AP VIEW)   Final Result   1.   Nonobstructive bowel gas pattern. Mild prominence of small bowel loops   may represent mild ileus in the appropriate clinical setting. 2.  Suspect splenomegaly. CT HEAD WO CONTRAST   Final Result   Aging or evolving of known left PCA vascular territory infarct. Large area   of hypodensity is now seen in this region. No hemorrhage noted. Underlying atrophy with periventricular and at additional areas of small   vessel ischemic change      Small cavernoma posterior right parietal lobe         MRI BRAIN WO CONTRAST   Final Result   Large acute left PCA vascular territory infarct. The findings were sent to the Radiology Results [address] at 7:07   pm on 10/13/2020to be communicated to a licensed caregiver. XR CHEST (2 VW)   Final Result   1. Superior mediastinal widening, likely due to prominent vasculature, given   appearance on CT angiography of the neck performed just prior to this. 2.  Hazy opacity at the right lung base with volume loss in the right   hemithorax, suggesting atelectasis. CTA HEAD NECK W CONTRAST   Final Result   Partial occlusion in the mid to distal V4 segment of the right vertebral   artery. There is up to 40% stenosis in mid V4 segment of the left vertebral   artery. Partial occlusion in the P2 segment of the left PCA. 75% stenosis at the origin of the a M2 branch of the right MCA. 70% stenosis at the origin of the right common carotid artery. 50% stenosis at the origin of the right internal carotid artery. 30% stenosis at the origin of the left internal carotid artery. 70% severe at the distal V1 segment of the right vertebral artery. Results were reported to Dr. Ivanna Friedman at 4:07 p.m. on October 13, 2020. CT HEAD WO CONTRAST   Final Result   Posteriorly in the periventricular region on the right there is a nodular   area of increased density measuring 7 mm. This appears somewhat tubular in   character. .  This could represent a small a cavernoma or venous angioma. However a small area of hemorrhage could also have this appearance. There is a bulbous appearance to the middle cerebral artery distally on the   right, either due to tortuous vessel or small aneurysm. Atrophy and small-vessel ischemic change      Results discussed with [facility] by Germaine Gill. Rukhsana Jean MD at 7:06 am on   10/13/2020             Assessment/Plan:    Active Hospital Problems    Diagnosis Date Noted    Acute CVA (cerebrovascular accident) [facility]) [I63.9] 10/13/2020     acute right hemiparesis and acute encephalopathy secondary to left sided infarct  MRI with infarct in the posterior circulation  CT angio of head and neck showed multiple intracranial stenoses  On aspirin and Plavix, A1c 6.4  Echo normal, no PFO  Patient will need 30-day Holter monitor  Continue telemetry    Acute kidney injury strongly suspect secondary to the blood pressure fluctuations  For now we will hold valsartan  Nephrology consulted, appreciate recommendation    Delirium   multifactorial, delirium precautions    Hypertension malignant with endorgan damage  Continue labetalol, amlodipine. Continue to dysphagia diet  Continue hydralazine  Nephrology is following    GI bleed/ acute blood loss anemia    EGD   Ulcerative esophagitis with clot and diffuse oozing from ulceration. No V.V. Controlled with epi injection.                  Large hiatal hernia               2 clean based ulcers in the bulb  - monitor H and H  IV PPI gtt       Elevated troponin  EKG shows some new T inversions in V3 and V4 as compared to previous EKG  He does not have any chest pain  Troponins more or less flat  Seen by cardiology  On aspirin and Plavix  On Lipitor full dose    Coronary  artery disease  On aspirin and Plavix  - on hold    Prediabetes  Start Metformin on discharge    L superior mediastinal widening on chest x-ray  His radial pulses are equal more importantly on the CT angiogram of the neck the chest x-ray report acknowledges that that area was included on the CT angiogram and was visualized and showed only prominent vasculature ends at this time will not pursue any further testing    Seen by physical medicine and rehab for ARU placement if blood pressure remained stable over the next 24 hours can be discharged on Monday    DVT Prophylaxis: sc lovenox   Diet: Diet NPO Effective Now  Code Status: Full Code      Dispo -plan to inpatient rehab tomorrow when bed available.     Ramila Hodge MD

## 2020-10-21 NOTE — PROGRESS NOTES
MD Irena Mae MD Rice Kuster, MD               Office: [phone]                      Fax: [phone]          NEPHROLOGY PROGRESS NOTE:     PATIENT NAME: Justine Adler  : 1945  MRN: [id number]      RECOMMENDATIONS:  - BP remains uncontrolled - fluctuating - goal ~ 140-150s. - further increase PO Hydralazine from 25TID to 50 TID -> 75TID  + add PRN for SBP > 170s. - low dose K repletion     - f/u w/ neuro, GI  - on going plans to d/c to ARU   - stable from renal POV , if BP < 170s.   - d/w pt, her daughter       IMPRESSION    EDIL (on no-CKD ): Improving   - Oligouric- some improvement   : S Cr- Baseline ~ 0.8-0.9  -> peaked at 1.6  : Etiology of EDIL - presumed pre-renal / early ATN w/ GIB + BP fluctuation + S/p Cta Head Neck W Contrast 10/13/2020   - other differentials: unlikely    - UA : trace pro, some hyaline cast = pre-renal   - Renal imaging: not needed for now :   - Acid-Base: acidosis - mild monitoring     Other problems:  Acute Rt weakness w/ Lt brain infarct   JODY - d/to above   Delirium   Trops elevated: ?demand     GIB - - EGD on 10/19 - Ulcerative eso, w/ diffuse oozing, needing EPI injection, f/u w/ Hb close    Patient Active Problem List   Diagnosis    Essential hypertension, benign    Coronary atherosclerosis of native coronary artery    Palpitations    Coronary artery disease due to lipid rich plaque    Abnormal transaminases    Diverticulitis    Unstable angina (HCC)    Midline thoracic back pain    Mixed hyperlipidemia    Bilateral carotid artery stenosis    Acute CVA (cerebrovascular accident) ([facility] [address]       Please refer to the orders. High Complexity. Multiple complex problems. Discussed with patient, and treatment team-   Thank you for allowing me to participate in this patient's care. Please do not hesitate to contact me with any questions/concerns. We will follow along with you.      Laura Higuera Ismael Cardoza MD       [facility] [address] [place] [address]  Office: [phone] or Via [employer]  Fax: [phone]      SUBJECTIVE/INTERVAL HISTORY:   -  no active complaints, more awake today,   But Was disoriented to time,   - ROS reported: as in HPI, CC, Subjective , and all other 10 systems' review negative,   - PMH, [address], Social history: reviewed   Past Medical History:   Diagnosis Date    Acute MI ([facility] [address] 2000    CAD (coronary artery disease)     Hyperlipidemia     Hypertension     Type II or unspecified type diabetes mellitus without mention of complication, not stated as uncontrolled     diet controlled     MEDICATIONS:  Prior to Admission Medications:  Medications Prior to Admission: HYDROcodone-acetaminophen (Xiomy Heman)  MG per tablet, Take 1 tablet by mouth every 6 hours as needed for Pain  Cyanocobalamin (VITAMIN B 12 PO), Take by mouth  aspirin 81 MG tablet, Take 1 tablet by mouth See Admin Instructions Monday Wed Fri  losartan (COZAAR) 50 MG tablet, Take 1 tablet by mouth daily  labetalol (NORMODYNE) 100 MG tablet, Take 1 tablet by mouth every 12 hours  clopidogrel (PLAVIX) 75 MG tablet, Take 1 tablet by mouth daily  rosuvastatin (CRESTOR) 10 MG tablet, TAKE 1 TABLET BY MOUTH EVERY DAY  amLODIPine (NORVASC) 10 MG tablet, Take 1 tablet by mouth daily (Patient taking differently: Take 10 mg by mouth daily Indications: currently only taking 5mg 11/4/19 )  Coenzyme Q10 (COQ10) 200 MG CAPS, Take 1 tablet by mouth daily  nitroGLYCERIN (NITROLINGUAL) 0.4 MG/SPRAY 0.4 mg spray, Place 1 spray under the tongue every 5 minutes as needed for Chest pain  sildenafil (VIAGRA) 100 MG tablet, Take 1 tablet by mouth as needed for Erectile Dysfunction     Scheduled Meds:   hydrALAZINE  50 mg Oral 3 times per day    labetalol  200 mg Oral 2 times per day    amLODIPine  10 mg Oral Daily    sodium chloride flush  10 mL Intravenous 2 times per day    atorvastatin  80 mg Oral Nightly     Continuous Infusions:   Soft , bowel sounds are normal, and no tenderness to palpation. Skin: No rash , some bruises,   CNS: Awake, No focal.   Extremities/MSK: mild Edema, no cyanosis. DATA:  Diagnostic tests reviewed for today's visit:    Recent Labs     10/20/20  0447 10/20/20  1256 10/20/20  1817 10/21/20  0056 10/21/20  0503   HCT 31.7* 30.4* 31.1* 27.7* 30.8*     Iron Saturation:  No components found for: PERCENTFE  FERRITIN:    Lab Results   Component Value Date    FERRITIN 966 03/02/2015     IRON:    Lab Results   Component Value Date    IRON 46 03/02/2015     TIBC:    Lab Results   Component Value Date    TIBC 203 03/02/2015       Recent Labs     10/19/20  0508 10/20/20  0447 10/21/20  0503    139 141   K 4.4 3.5 3.2*   * 109 110   CO2 19* 21 21   BUN 44* 30* 20   CREATININE 1.2 1.3 1.2     Recent Labs     10/19/20  0508 10/20/20  0447 10/21/20  0503   CALCIUM 8.0* 8.5 8.4   PHOS 2.7 2.3* 3.0     No results for input(s): PH, PCO2, PO2 in the last 72 hours.     Invalid input(s): Honorio Millin    ABG:  No results found for: PH, PCO2, PO2, HCO3, BE, THGB, TCO2, O2SAT  VBG:  No results found for: PHVEN, ZIE4ZPU, BEVEN, K1LSZHTM    LDH:  No results found for: LDH  Uric Acid:  No results found for: LABURIC, URICACID    PT/INR:    Lab Results   Component Value Date    PROTIME 14.0 10/14/2020    INR 1.20 10/14/2020     Warfarin PT/INR:  No components found for: Rana Haven  PTT:    Lab Results   Component Value Date    APTT 31.4 01/22/2016   [APTT}    Last 3 Troponin:    Lab Results   Component Value Date    TROPONINI 0.03 10/15/2020    TROPONINI 0.03 10/14/2020    TROPONINI 0.05 10/14/2020       U/A:    Lab Results   Component Value Date    COLORU YELLOW 10/17/2020    PROTEINU TRACE 10/17/2020    PHUR 5.5 10/17/2020    WBCUA 1 10/17/2020    RBCUA 3 10/17/2020    BACTERIA Rare 02/28/2015    CLARITYU Clear 10/17/2020    SPECGRAV 1.024 10/17/2020    LEUKOCYTESUR Negative 10/17/2020    UROBILINOGEN 0.2 10/17/2020    BILIRUBINUR SMALL 10/17/2020    BLOODU Negative 10/17/2020    GLUCOSEU Negative 10/17/2020     Microalbumen/Creatinine ratio:  No components found for: RUCREAT  24 Hour Urine for Protein:  No components found for: RAWUPRO, UHRS3, SWWU19TC, UTV3  24 Hour Urine for Creatinine Clearance:  No components found for: CREAT4, UHRS10, UTV10  Urine Toxicology: No components found for: Herlene Fruits, IBENZO, ICOCAINE, IMARTHC, IOPIATES, IPHENCYC    HgBA1c:    Lab Results   Component Value Date    LABA1C 6.4 10/13/2020     RPR:  No results found for: RPR  HIV:  No results found for: HIV  DEANA:    Lab Results   Component Value Date    DEANA Negative 12/26/2017     RF:  No results found for: RF  DSDNA:  No components found for: DNA  AMYLASE:  No results found for: AMYLASE  LIPASE:    Lab Results   Component Value Date    LIPASE 72.0 02/27/2015     Fibrinogen Level:  No components found for: FIB      BELOW MENTIONED RADIOLOGY STUDIES REVIEWED BY ME:    Xr Chest (2 Vw)    Result Date: 10/13/2020  EXAMINATION: TWO XRAY VIEWS OF THE CHEST 10/13/2020 8:54 am COMPARISON: 02/20/2018. CT angiography of the neck dated 10/13/2020 HISTORY: ORDERING SYSTEM PROVIDED HISTORY: dyspnea TECHNOLOGIST PROVIDED HISTORY: Reason for exam:->dyspnea Reason for Exam: Altered Mental Status (pt brought to ed by [facility] [place] [facility] after wife found him on floor, unsure how long pt confused unable to follow directions and answer questions correctly ) Acuity: Unknown Type of Exam: Unknown FINDINGS: There is mild superior mediastinal widening, but this area is included in the field of view on the previous CT angiography of the neck and presumably related to prominent vasculature. Heart size is mildly prominent. No significant vascular congestion. Patient rotation mildly limits assessment. There is hazy opacity at the medial right lung base and mild volume loss in the right hemithorax, suggesting atelectasis.   No evidence of pneumothorax or sizable pleural effusion. Osseous structures are diffusely demineralized. No evidence of acute osseous abnormality. 1.  Superior mediastinal widening, likely due to prominent vasculature, given appearance on CT angiography of the neck performed just prior to this. 2.  Hazy opacity at the right lung base with volume loss in the right hemithorax, suggesting atelectasis. Xr Abdomen (kub) (single Ap View)    Result Date: 10/18/2020  EXAMINATION: ONE SUPINE XRAY VIEW(S) OF THE ABDOMEN 10/18/2020 9:39 pm COMPARISON: Abdominal radiographs 10/15/2020, 07/18/2018 HISTORY: ORDERING SYSTEM PROVIDED HISTORY: pain abdomen TECHNOLOGIST PROVIDED HISTORY: Reason for exam:->pain abdomen Reason for Exam: abd pain Acuity: Unknown Type of Exam: Unknown FINDINGS: Paucity of small bowel gas. Gas and stool in the downstream colon. No significant stool volume. Questionable enlargement of the splenic shadow. Fixation device along the lower lumbar spine. No acute osseous abnormality. No focal airspace disease at the lung bases although some of the small airways seem thickened. Paucity of small bowel gas likely reflects decompressed or fluid-filled loops of bowel. No apparent obstruction. Splenomegaly, correlating with prior imaging. Basilar airways seem thickened, suggesting inflammation. Xr Abdomen (kub) (single Ap View)    Result Date: 10/15/2020  EXAMINATION: ONE SUPINE XRAY VIEW(S) OF THE ABDOMEN 10/15/2020 11:07 am COMPARISON: CT 12/21/2017 HISTORY: ORDERING SYSTEM PROVIDED HISTORY: abdo pain TECHNOLOGIST PROVIDED HISTORY: Reason for exam:->abdo pain Acuity: Unknown FINDINGS: Nonobstructive bowel gas pattern. Few loops of small bowel measure up to 2.4 cm. Gas is present in the colon. The splenic shadow appears enlarged extending beyond the costal margin. The lung bases are clear. Status post fusion of the L4-5 posterior elements. Calcified atheromatous plaque. 1.  Nonobstructive bowel gas pattern.   Mild prominence of small bowel loops may represent mild ileus in the appropriate clinical setting. 2.  Suspect splenomegaly. Ct Head Wo Contrast    Result Date: 10/15/2020  EXAMINATION: CT OF THE HEAD WITHOUT CONTRAST  10/15/2020 10:58 am TECHNIQUE: CT of the head was performed without the administration of intravenous contrast. Dose modulation, iterative reconstruction, and/or weight based adjustment of the mA/kV was utilized to reduce the radiation dose to as low as reasonably achievable. COMPARISON: CT brain MRI 10/13/2020 HISTORY: ORDERING SYSTEM PROVIDED HISTORY: [facility] recent stroke TECHNOLOGIST PROVIDED HISTORY: Reason for exam:->ams recent stroke Has a \"code stroke\" or \"stroke alert\" been called? ->No Reason for Exam: ams recent stroke Acuity: Unknown Type of Exam: Unknown FINDINGS: BRAIN/VENTRICLES: Ventricles are midline in position and mildly enlarged in size. Moderate periventricular hypodensity is seen. Since prior CT scan there has been continued aging or evolving the left PCA vascular territory infarct. Large area of hypodensity is now seen in this region No obvious hemorrhage Small cavernoma posterior right parietal lobe is seen. . ORBITS: The visualized portion of the orbits demonstrate no acute abnormality. SINUSES: Fluid is seen in the mastoid air cells. There is bowing and spurring of the nasal septum. Mild mucosal thickening is seen in the maxillary sinuses. SOFT TISSUES/SKULL:  No acute abnormality of the visualized skull or soft tissues. Aging or evolving of known left PCA vascular territory infarct. Large area of hypodensity is now seen in this region. No hemorrhage noted.  Underlying atrophy with periventricular and at additional areas of small vessel ischemic change Small cavernoma posterior right parietal lobe     Ct Head Wo Contrast    Result Date: 10/13/2020  EXAMINATION: CT OF THE HEAD WITHOUT CONTRAST  10/13/2020 6:52 am TECHNIQUE: CT of the head was performed without the administration of intravenous contrast. Dose modulation, iterative reconstruction, and/or weight based adjustment of the mA/kV was utilized to reduce the radiation dose to as low as reasonably achievable. COMPARISON: None. HISTORY: ORDERING SYSTEM PROVIDED HISTORY: stroke TECHNOLOGIST PROVIDED HISTORY: Has a \"code stroke\" or \"stroke alert\" been called? ->Yes Reason for exam:->stroke Reason for Exam: stroke Acuity: Acute Type of Exam: Initial Relevant Medical/Surgical History: Altered Mental Status ([facility] him on floor, unsure how long pt confused unable to follow directions and answer questions correctly ) FINDINGS: BRAIN/VENTRICLES: Ventricles are midline in position and mildly enlarged in size. There is moderate periventricular hypodensity seen. Patchy and confluent hypodensity is seen scattered throughout the frontal and parietal white matter. Posteriorly in the periventricular region on the right there is a nodular area of increased density measuring 7 mm. This appears somewhat tubular in character. There is a bulbous appearance to the middle cerebral artery distally on the right. There is intracranial atherosclerosis. . ORBITS: The visualized portion of the orbits demonstrate no acute abnormality. SINUSES: Fluid is seen in the mastoid air cells bilaterally. Mild mucosal thickening is seen in the maxillary sinuses. There is streak artifact from dental amalgam. SOFT TISSUES/SKULL:  No acute abnormality of the visualized skull or soft tissues. Posteriorly in the periventricular region on the right there is a nodular area of increased density measuring 7 mm. This appears somewhat tubular in character. .  This could represent a small a cavernoma or venous angioma. However a small area of hemorrhage could also have this appearance. There is a bulbous appearance to the middle cerebral artery distally on the right, either due to tortuous vessel or small aneurysm. Atrophy and small-vessel ischemic change Results discussed with [facility] by Jim Larose. Gumaro Christian MD at 7:06 am on 10/13/2020     Cta Head Neck W Contrast    Result Date: 10/13/2020  EXAMINATION: CTA OF THE HEAD AND NECK WITH CONTRAST 10/13/2020 6:57 am: TECHNIQUE: CTA of the head and neck was performed with the administration of intravenous contrast. Multiplanar reformatted images are provided for review. MIP images are provided for review. Stenosis of the internal carotid arteries measured using NASCET criteria. Dose modulation, iterative reconstruction, and/or weight based adjustment of the mA/kV was utilized to reduce the radiation dose to as low as reasonably achievable. COMPARISON: CT head October 13, 2020, MRI brain November 14, 2017 HISTORY: ORDERING SYSTEM PROVIDED HISTORY: stroke TECHNOLOGIST PROVIDED HISTORY: Reason for exam:->stroke Reason for Exam: stroke Acuity: Acute Type of Exam: Initial Relevant Medical/Surgical History: Altered Mental Status (pt brought to ed by [facility] after wife found him on floor, unsure how long pt confused unable to follow directions and answer questions correctly ) FINDINGS: CTA NECK: AORTIC ARCH/ARCH VESSELS: No dissection or arterial injury. No significant stenosis of the brachiocephalic or subclavian arteries. CAROTID ARTERIES: There is 70% stenosis at the origin of the right common carotid artery. There is 50% stenosis at the origin of the right internal carotid artery. There is 30% stenosis at the origin of the left internal carotid artery. VERTEBRAL ARTERIES: There is stenosis along the right cervical vertebral artery, most severe at the distal V1 segment with up to 70% stenosis. No acute abnormality or flow stenosis in the cervical portion of the left vertebral artery. SOFT TISSUES: There is moderate emphysema. No cervical or superior mediastinal lymphadenopathy. The larynx and pharynx are unremarkable. No acute abnormality of the salivary glands.   There is 1 cm left thyroid nodule, does not require follow-up due to small size. BONES: No acute osseous abnormality. CTA HEAD: ANTERIOR CIRCULATION: There is 75% stenosis at the origin of an M2 branch of the right MCA. No significant stenosis of the intracranial internal carotid, anterior cerebral, or left middle cerebral arteries. No aneurysm. POSTERIOR CIRCULATION: There is partial occlusion in the mid to distal V4 segment of the right vertebral artery. There is up to 40% stenosis in mid V4 segment of the left vertebral artery. There is partial occlusion in the P2 segment of the left PCA. No significant stenosis of the basilar, or right posterior cerebral arteries. No aneurysm. OTHER: No dural venous sinus thrombosis on this non-dedicated study. BRAIN: There is a 7 mm focus of hyperattenuation in the right parietal periventricular white matter, likely corresponding to a cavernoma, stable in size since MRI brain November 14, 2017. There is mild parenchymal volume loss. There is periventricular white matter low attenuation, likely related to mild to moderate chronic microvascular disease. There is mild hydrocephalus, stable. No mass effect or midline shift. No extra-axial fluid collection. The gray-white differentiation is maintained. There is mild mucosal thickening in the paranasal sinuses. There are moderate bilateral mastoid effusions. Partial occlusion in the mid to distal V4 segment of the right vertebral artery. There is up to 40% stenosis in mid V4 segment of the left vertebral artery. Partial occlusion in the P2 segment of the left PCA. 75% stenosis at the origin of the a M2 branch of the right MCA. 70% stenosis at the origin of the right common carotid artery. 50% stenosis at the origin of the right internal carotid artery. 30% stenosis at the origin of the left internal carotid artery. 70% severe at the distal V1 segment of the right vertebral artery.  Results were reported to Dr. Eligio Mora at 4:07 p.m. on October 13, 2020. Mri Brain Wo Contrast    Result Date: 10/13/2020  EXAMINATION: MRI OF THE BRAIN WITHOUT CONTRAST  10/13/2020 6:16 pm TECHNIQUE: Multiplanar multisequence MRI of the brain was performed without the administration of intravenous contrast. COMPARISON: None. HISTORY: ORDERING SYSTEM PROVIDED HISTORY: cva TECHNOLOGIST PROVIDED HISTORY: Reason for exam:->cva Reason for Exam: cva disorientation aphasia Acuity: Acute Type of Exam: Unknown FINDINGS: INTRACRANIAL STRUCTURES/VENTRICLES: There is an acute infarct within the left occipitotemporal lobe, extending into the ipsilateral thalamus. This measures at least 9 cm in diameter. No mass, shift, or acute intracranial bleed is identified. There is volume loss with mild-to-moderate chronic white matter microvascular ischemic change. Normal expected signal voids are present within the vessels at the base of skull. A focus of susceptibility artifact adjacent to the right lateral ventricle posteriorly on image number 23 of series 8 could reflect a small cavernoma. ORBITS: The visualized portion of the orbits demonstrate no acute abnormality. SINUSES: There is a moderate to severe right mastoid effusion. BONES/SOFT TISSUES: The bone marrow signal intensity appears normal. The soft tissues demonstrate no acute abnormality. Large acute left PCA vascular territory infarct. The findings were sent to the Radiology Results [address] at 7:07 pm on 10/13/2020to be communicated to a licensed caregiver.

## 2020-10-22 PROBLEM — I63.9 ACUTE ISCHEMIC STROKE (HCC): Status: ACTIVE | Noted: 2020-01-01

## 2020-10-22 NOTE — PLAN OF CARE
[address], [address]  [phone]      Vianey Rojas    : 1945  Acct #: [de-identified]  MRN: [id number]  PHYSICIAN:  Brittani Bear MD  Primary Problem    Patient Active Problem List   Diagnosis    Essential hypertension, benign    Coronary atherosclerosis of native coronary artery    Palpitations    Coronary artery disease due to lipid rich plaque    Abnormal transaminases    Diverticulitis    Unstable angina ([facility] [address]    Midline thoracic back pain    Mixed hyperlipidemia    Bilateral carotid artery stenosis    Acute CVA (cerebrovascular accident) ([facility] [address]       Rehabilitation Diagnosis:  ***     ADMIT DATE:10/22/2020    Patient Goals: ***  Admitting Impairments: ***  Activities: ***  Participation: ***   CARE PLAN     NURSING:  Brenda Pulido while on this unit will:  [x] Be continent of bowel and bladder     [x] Have an adequate number of bowel movements  [x] Urinate with no urinary retention >300ml in bladder  [x] Complete bladder protocol with henry removal  [] Maintain O2 SATs at ___%  [] Have pain managed while on ARU       [x] Be pain free by discharge   [x] Have no skin breakdown while on ARU  [x] Have improved skin integrity via wound measurements  [] Have no signs/symptoms of infection at the wound site  [] Be free from injury during hospitalization   [] Complete education with patient/family with understanding demonstrated for:  [] Adjustment   [] Other:     Nursing Interventions will include:  [x] bowel/bladder training   [x] education for medical assistive devices   [] medication education   [x] O2 saturation management   [x] energy conservation   [x] stress management techniques   [x] fall prevention   [] alarms protocol   [x] seating and positioning   [x] skin/wound care   [] pressure relief instruction   [] dressing changes     [x] infection protection   [x] DVT prophylaxis  [x] assistance with in room safety with transfers to bed, toilet, wheelchair, shower   [x] bathroom activities and hygiene  [] Other:    Patient/Caregiver Education for:  [x] Disease/sustained injury/management     [x] Medication Use  [] Surgical intervention  [x] Safety  [x] Body mechanics and or joint protection  [x] Health maintenance     [] Other:     PHYSICAL THERAPY:  Goals: These goals were reviewed with this patient at the time of assessment and Kelleysolitario Easts is in agreement. Plan of Care: Pt to be seen 5 out of 7 days per week per ARU protocol ( *** minutes with PT)                       OCCUPATIONAL THERAPY:  Goals:               :    :  These goals were reviewed with this patient at the time of assessment and Mantuasolitario Hernandezsins is in agreement    Plan of Care:  Pt to be seen 5 out of 7 days per week per ARU protocol ( *** minutes with OT)       SPEECH THERAPY: Goals will be left blank if speech is not following this patient. Goals:                                                                               Plan of Care:  Pt to be seen 5 out of 7 days per week per ARU protocol ( *** minutes with SLP)    Therapy Treatments will include:  []  therapeutic exercises    []  gait training     []  neuromuscular re-ed                            []  transfer training             [] community reintegration    [] bed mobility                          []  w/c mobility and training  []  self care    []home mgmt    []  cognitive training            []  energy conservation        []  dysphagia tx    []  speech/language/communication therapy   []  group therapy    []  patient/family education    [] Other:    CASE MANAGEMENT:  Goals:   Assist patient/family with discharge planning, patient/family counseling,   and coordination with insurance during ARU stay.        Kelley Hernandezsins will be seen a minimum of 3 hours of therapy per day, a minimum of 5 out of 7 days per week  (please see above for specific treatment plan per PT/OT/SLP). [] In this rare instance due to the nature of this patient's medical involvement, this patient will be seen 15 hours per week (900 minutes within a 7 day period). In addition, dietician/nutritionist may monitor calorie count as well as intake and collaboratively work with SLP on dietary upgrades. Neuropsychology/Psychology may evaluate and provide necessary support. Medical issues being managed closely and that require 24 hour availability of a physician:  [] Swallowing Precautions  [] Bowel/Bladder Fx  [] Weight bearing precautions  [] Wound Care    [] Pain Mgmt   [] Infection Protection  [] DVT Prophylaxis   [] Fall Precautions  [] Fluid/Electrolyte/Nutrition Balance  [] Voice Protection   [] Respiratory  [] Other:    Medical Prognosis: [] Good  [] Fair    [] Guarded   Total expected IRF days ***  Anticipated discharge destination:***  [] Home Independently   [] Home with supervision    []SNF     [] Other                                           Physician anticipated functional outcomes:  ***   IPOC brief synthesis: ***      I have reviewed this initial plan of care and agree with its contents:    Title   Name    Date    Time    Physician:     Case Mgmt:    OT:     PT:    RN: Lisa Hernandez RN MSN CRRN 10/22/2020  [id number]      ST:    :     Other:

## 2020-10-22 NOTE — PROGRESS NOTES
William Avilez  10/22/2020  [id number]    Chief Complaint: <principal problem not specified>    Subjective:   No overnight events. No current complaints. BP improving. Approved by insurance for ARU.     ROS: unable to be obtained. Objective:  Patient Vitals for the past 24 hrs:   BP Temp Temp src Pulse Resp SpO2 Weight   10/22/20 0920 (!) 156/97 -- -- -- -- -- --   10/22/20 0900 (!) 185/79 -- -- -- -- -- --   10/22/20 0800 (!) 229/95 98.3 °F (36.8 °C) Temporal 75 20 98 % --   10/22/20 0629 (!) 159/81 -- -- -- -- -- --   10/22/20 0600 -- -- -- -- -- -- 187 lb 3.2 oz (84.9 kg)   10/21/20 2004 (!) 148/73 98.3 °F (36.8 °C) Temporal 75 21 95 % --   10/21/20 1700 (!) 131/107 -- -- 62 18 -- --   10/21/20 1600 (!) 167/78 98.1 °F (36.7 °C) Temporal 71 16 100 % --   10/21/20 1501 121/85 -- -- 66 17 -- --   10/21/20 1400 (!) 180/94 -- -- 64 20 -- --   10/21/20 1300 (!) 165/64 -- -- 62 21 -- --   10/21/20 1200 -- 97.2 °F (36.2 °C) Temporal -- -- -- --     Gen: No distress, pleasant. Resting in bed  HEENT: Normocephalic, atraumatic. CV: No audible murmurs, well perfused extremities  Resp: No respiratory distress. No increased WOB  Abd: Soft, nontender nondistended  Ext: No edema. Neuro: Alert, oriented x0, aphasia and right hemiparesis remains. Laboratory data: Available via EMR. Therapy progress:  PT  Position Activity Restriction  Other position/activity restrictions: William Avilez is a 76 y.o. male who presents to the emergency department fall. This morning wife noted that he was on the ground and she called EMS because she was unable to get them up. She states that over the last 48 hours he has been feeling unwell. She did not notice any significant weakness or confusion but states that he has been slower than usual over the last 48 hours.  MRI showed \"Large acute left PCA vascular territory infarct\"  Objective     Sit to Stand: Dependent/Total, 2 Person Assistance(Max A x2)  Stand to sit: 2 Person Assistance, Dependent/Total(max A of 2)  Bed to Chair: Dependent/Total, 2 Person Assistance(Max A x2)     OT  PT Equipment Recommendations  Equipment Needed: No     Assessment        SLP                Body mass index is 25.39 kg/m². Assessment:  Patient Active Problem List   Diagnosis    Essential hypertension, benign    Coronary atherosclerosis of native coronary artery    Palpitations    Coronary artery disease due to lipid rich plaque    Abnormal transaminases    Diverticulitis    Unstable angina (HCC)    Midline thoracic back pain    Mixed hyperlipidemia    Bilateral carotid artery stenosis    Acute CVA (cerebrovascular accident) ([facility] [address]       Plan:   Large left PCA infarct: ASA, statin, BP control. PT/OT for hemiparesis, safety and endurance. SLP for cognition. Dysphagia: SLP   Acute encephalopathy: improved. Uncontrolled HTN  CAD  DM  HLD  GIB: s/p EGD with epi injections for ulcerative esophagitis, PPI     Dispo: Patient is an appropriate ARU candidate. Able to tolerate the required 3 hours of therapy in an ARU setting. Precert approved. Medically appropriate for ARU transfer from my standpoint. Able to accept today if approved by primary team. Orders placed for transfer. Chepe Gonsalez MD 10/22/2020, 11:01 AM    * This document was created using dictation software. While all precautions were taken to ensure accuracy, errors may have occurred. Please disregard any typographical errors.

## 2020-10-22 NOTE — PROGRESS NOTES
MD Jayden Hernandez MD Norlene Sarna, MD               Office: [phone]                      Fax: [phone]          NEPHROLOGY PROGRESS NOTE:     PATIENT NAME: Mony Vega  : 1945  MRN: [id number]      RECOMMENDATIONS:  - BP better controlled now  Close to goal ~ 140-150s. - keep further increased PO Hydralazine from 25TID to 50 TID -> 75TID  + add PRN for SBP > 170s. - will keep low dose K repletion     - f/u w/ neuro, GI  - on going eval to d/c to ARU     - stable from renal POV ,        IMPRESSION    EDIL (on no-CKD ): Improving   - Oligouric- some improvement   : S Cr- Baseline ~ 0.8-0.9  -> peaked at 1.6  : Etiology of EDIL - presumed pre-renal / early ATN w/ GIB + BP fluctuation + S/p Cta Head Neck W Contrast 10/13/2020   - other differentials: unlikely    - UA : trace pro, some hyaline cast = pre-renal   - Renal imaging: not needed for now :   - Acid-Base: acidosis - mild monitoring     Other problems:  Acute Rt weakness w/ Lt brain infarct   JODY - d/to above   Delirium   Trops elevated: ?demand     GIB - - EGD on 10/19 - Ulcerative eso, w/ diffuse oozing, needing EPI injection, f/u w/ Hb close    Patient Active Problem List   Diagnosis    Essential hypertension, benign    Coronary atherosclerosis of native coronary artery    Palpitations    Coronary artery disease due to lipid rich plaque    Abnormal transaminases    Diverticulitis    Unstable angina (HCC)    Midline thoracic back pain    Mixed hyperlipidemia    Bilateral carotid artery stenosis    Acute CVA (cerebrovascular accident) ([address]       Please refer to the orders. High Complexity. Multiple complex problems. Discussed with patient, and treatment team-   Thank you for allowing me to participate in this patient's care. Please do not hesitate to contact me with any questions/concerns. We will follow along with you.      Raissa Wong MD       [facility] [address] [place]: [phone] or Via [employer]  Fax: [phone]      SUBJECTIVE/INTERVAL HISTORY:   -  Seen resting in bed, more disoriented today   - ROS reported: as in HPI, CC, Subjective , and all other 10 systems' review negative,   - PMH, [facility], Social history: reviewed   Past Medical History:   Diagnosis Date    Acute MI ([facility] [address] 2000    CAD (coronary artery disease)     Hyperlipidemia     Hypertension     Type II or unspecified type diabetes mellitus without mention of complication, not stated as uncontrolled     diet controlled     MEDICATIONS:  Prior to Admission Medications:  Medications Prior to Admission: HYDROcodone-acetaminophen (Desma Ree)  MG per tablet, Take 1 tablet by mouth every 6 hours as needed for Pain  Cyanocobalamin (VITAMIN B 12 PO), Take by mouth  aspirin 81 MG tablet, Take 1 tablet by mouth See Admin Instructions Monday Wed Fri  losartan (COZAAR) 50 MG tablet, Take 1 tablet by mouth daily  labetalol (NORMODYNE) 100 MG tablet, Take 1 tablet by mouth every 12 hours  clopidogrel (PLAVIX) 75 MG tablet, Take 1 tablet by mouth daily  rosuvastatin (CRESTOR) 10 MG tablet, TAKE 1 TABLET BY MOUTH EVERY DAY  amLODIPine (NORVASC) 10 MG tablet, Take 1 tablet by mouth daily (Patient taking differently: Take 10 mg by mouth daily Indications: currently only taking 5mg 11/4/19 )  Coenzyme Q10 (COQ10) 200 MG CAPS, Take 1 tablet by mouth daily  nitroGLYCERIN (NITROLINGUAL) 0.4 MG/SPRAY 0.4 mg spray, Place 1 spray under the tongue every 5 minutes as needed for Chest pain  sildenafil (VIAGRA) 100 MG tablet, Take 1 tablet by mouth as needed for Erectile Dysfunction     Scheduled Meds:   hydrALAZINE  75 mg Oral 3 times per day    labetalol  200 mg Oral 2 times per day    amLODIPine  10 mg Oral Daily    sodium chloride flush  10 mL Intravenous 2 times per day    atorvastatin  80 mg Oral Nightly     Continuous Infusions:    PRN Meds:.hydrALAZINE, prochlorperazine, labetalol, HYDROmorphone, sodium chloride flush, polyethylene glycol, promethazine **OR** ondansetron, perflutren lipid microspheres, HYDROcodone 5 mg - acetaminophen        OBJECTIVE:   PHYSICAL EXAM:  Patient Vitals for the past 24 hrs:   BP Temp Temp src Pulse Resp SpO2 Weight   10/22/20 0629 (!) 159/81 -- -- -- -- -- --   10/22/20 0600 -- -- -- -- -- -- 187 lb 3.2 oz (84.9 kg)   10/21/20 2004 (!) 148/73 98.3 °F (36.8 °C) Temporal 75 21 95 % --   10/21/20 1700 (!) 131/107 -- -- 62 18 -- --   10/21/20 1600 (!) 167/78 98.1 °F (36.7 °C) Temporal 71 16 100 % --   10/21/20 1501 121/85 -- -- 66 17 -- --   10/21/20 1400 (!) 180/94 -- -- 64 20 -- --   10/21/20 1300 (!) 165/64 -- -- 62 21 -- --   10/21/20 1200 -- 97.2 °F (36.2 °C) Temporal -- -- -- --   10/21/20 1100 (!) 189/88 98.1 °F (36.7 °C) Temporal 67 19 94 % --       Intake/Output Summary (Last 24 hours) at 10/22/2020 0831  Last data filed at 10/22/2020 0600  Gross per 24 hour   Intake 374 ml   Output 1925 ml   Net -1551 ml       General: no acute distress,   disoriented   CVS:  Heart sounds are normal. No murmur. RS: Normal respiratory efforts,  Lung fields are diminished at bases . Abd: Soft , bowel sounds are normal, and no tenderness to palpation. Skin: No rash , some bruises,   Extremities/MSK: mild Edema, no cyanosis.         DATA:  Diagnostic tests reviewed for today's visit:    Recent Labs     10/21/20  0056 10/21/20  0503 10/21/20  1140 10/21/20  1827 10/22/20  0506   HCT 27.7* 30.8* 31.3* 31.4* 35.3*     Iron Saturation:  No components found for: PERCENTFE  FERRITIN:    Lab Results   Component Value Date    FERRITIN 966 03/02/2015     IRON:    Lab Results   Component Value Date    IRON 46 03/02/2015     TIBC:    Lab Results   Component Value Date    TIBC 203 03/02/2015       Recent Labs     10/20/20  0447 10/21/20  0503 10/22/20  0506    141 140   K 3.5 3.2* 3.7    110 108   CO2 21 21 21   BUN 30* 20 16   CREATININE 1.3 1.2 1.0     Recent Labs 10/20/20  0447 10/21/20  0503 10/22/20  0506   CALCIUM 8.5 8.4 8.9   PHOS 2.3* 3.0 2.9     No results for input(s): PH, PCO2, PO2 in the last 72 hours.     Invalid input(s): Mirella Pollock    ABG:  No results found for: PH, PCO2, PO2, HCO3, BE, THGB, TCO2, O2SAT  VBG:  No results found for: PHVEN, KVK7ZDM, BEVEN, W1FWSTWH    LDH:  No results found for: LDH  Uric Acid:  No results found for: LABURIC, URICACID    PT/INR:    Lab Results   Component Value Date    PROTIME 14.0 10/14/2020    INR 1.20 10/14/2020     Warfarin PT/INR:  No components found for: John Phoenix  PTT:    Lab Results   Component Value Date    APTT 31.4 01/22/2016   [APTT}    Last 3 Troponin:    Lab Results   Component Value Date    TROPONINI 0.03 10/15/2020    TROPONINI 0.03 10/14/2020    TROPONINI 0.05 10/14/2020       U/A:    Lab Results   Component Value Date    COLORU YELLOW 10/17/2020    PROTEINU TRACE 10/17/2020    PHUR 5.5 10/17/2020    WBCUA 1 10/17/2020    RBCUA 3 10/17/2020    BACTERIA Rare 02/28/2015    CLARITYU Clear 10/17/2020    SPECGRAV 1.024 10/17/2020    LEUKOCYTESUR Negative 10/17/2020    UROBILINOGEN 0.2 10/17/2020    BILIRUBINUR SMALL 10/17/2020    BLOODU Negative 10/17/2020    GLUCOSEU Negative 10/17/2020     Microalbumen/Creatinine ratio:  No components found for: RUCREAT  24 Hour Urine for Protein:  No components found for: RAWUPRO, UHRS3, YNOJ18RA, UTV3  24 Hour Urine for Creatinine Clearance:  No components found for: CREAT4, UHRS10, UTV10  Urine Toxicology: No components found for: Safia Flatter, IBENZO, ICOCAINE, IMARTHC, IOPIATES, IPHENCYC    HgBA1c:    Lab Results   Component Value Date    LABA1C 6.4 10/13/2020     RPR:  No results found for: RPR  HIV:  No results found for: HIV  DEANA:    Lab Results   Component Value Date    DEANA Negative 12/26/2017     RF:  No results found for: RF  DSDNA:  No components found for: DNA  AMYLASE:  No results found for: AMYLASE  LIPASE:    Lab Results   Component Value Date    LIPASE 72.0 02/27/2015     Fibrinogen Level:  No components found for: FIB      BELOW MENTIONED RADIOLOGY STUDIES REVIEWED BY ME:    Xr Chest (2 Vw)    Result Date: 10/13/2020  EXAMINATION: TWO XRAY VIEWS OF THE CHEST 10/13/2020 8:54 am COMPARISON: 02/20/2018. CT angiography of the neck dated 10/13/2020 HISTORY: ORDERING SYSTEM PROVIDED HISTORY: dyspnea TECHNOLOGIST PROVIDED HISTORY: Reason for exam:->dyspnea Reason for Exam: Altered Mental Status (pt brought to ed by [facility] after wife found him on floor, unsure how long pt confused unable to follow directions and answer questions correctly ) Acuity: Unknown Type of Exam: Unknown FINDINGS: There is mild superior mediastinal widening, but this area is included in the field of view on the previous CT angiography of the neck and presumably related to prominent vasculature. Heart size is mildly prominent. No significant vascular congestion. Patient rotation mildly limits assessment. There is hazy opacity at the medial right lung base and mild volume loss in the right hemithorax, suggesting atelectasis. No evidence of pneumothorax or sizable pleural effusion. Osseous structures are diffusely demineralized. No evidence of acute osseous abnormality. 1.  Superior mediastinal widening, likely due to prominent vasculature, given appearance on CT angiography of the neck performed just prior to this. 2.  Hazy opacity at the right lung base with volume loss in the right hemithorax, suggesting atelectasis. Xr Abdomen (kub) (single Ap View)    Result Date: 10/18/2020  EXAMINATION: ONE SUPINE XRAY VIEW(S) OF THE ABDOMEN 10/18/2020 9:39 pm COMPARISON: Abdominal radiographs 10/15/2020, 07/18/2018 HISTORY: ORDERING SYSTEM PROVIDED HISTORY: pain abdomen TECHNOLOGIST PROVIDED HISTORY: Reason for exam:->pain abdomen Reason for Exam: abd pain Acuity: Unknown Type of Exam: Unknown FINDINGS: Paucity of small bowel gas. Gas and stool in the downstream colon.   No significant stool volume. Questionable enlargement of the splenic shadow. Fixation device along the lower lumbar spine. No acute osseous abnormality. No focal airspace disease at the lung bases although some of the small airways seem thickened. Paucity of small bowel gas likely reflects decompressed or fluid-filled loops of bowel. No apparent obstruction. Splenomegaly, correlating with prior imaging. Basilar airways seem thickened, suggesting inflammation. Xr Abdomen (kub) (single Ap View)    Result Date: 10/15/2020  EXAMINATION: ONE SUPINE XRAY VIEW(S) OF THE ABDOMEN 10/15/2020 11:07 am COMPARISON: CT 12/21/2017 HISTORY: ORDERING SYSTEM PROVIDED HISTORY: abdo pain TECHNOLOGIST PROVIDED HISTORY: Reason for exam:->abdo pain Acuity: Unknown FINDINGS: Nonobstructive bowel gas pattern. Few loops of small bowel measure up to 2.4 cm. Gas is present in the colon. The splenic shadow appears enlarged extending beyond the costal margin. The lung bases are clear. Status post fusion of the L4-5 posterior elements. Calcified atheromatous plaque. 1.  Nonobstructive bowel gas pattern. Mild prominence of small bowel loops may represent mild ileus in the appropriate clinical setting. 2.  Suspect splenomegaly. Ct Head Wo Contrast    Result Date: 10/15/2020  EXAMINATION: CT OF THE HEAD WITHOUT CONTRAST  10/15/2020 10:58 am TECHNIQUE: CT of the head was performed without the administration of intravenous contrast. Dose modulation, iterative reconstruction, and/or weight based adjustment of the mA/kV was utilized to reduce the radiation dose to as low as reasonably achievable. COMPARISON: CT brain MRI 10/13/2020 HISTORY: ORDERING SYSTEM PROVIDED HISTORY: ams recent stroke TECHNOLOGIST PROVIDED HISTORY: Reason for exam:->ams recent stroke Has a \"code stroke\" or \"stroke alert\" been called? ->No Reason for Exam: ams recent stroke Acuity: Unknown Type of Exam: Unknown FINDINGS: BRAIN/VENTRICLES: Ventricles are midline in position and mildly enlarged in size. Moderate periventricular hypodensity is seen. Since prior CT scan there has been continued aging or evolving the left PCA vascular territory infarct. Large area of hypodensity is now seen in this region No obvious hemorrhage Small cavernoma posterior right parietal lobe is seen. . ORBITS: The visualized portion of the orbits demonstrate no acute abnormality. SINUSES: Fluid is seen in the mastoid air cells. There is bowing and spurring of the nasal septum. Mild mucosal thickening is seen in the maxillary sinuses. SOFT TISSUES/SKULL:  No acute abnormality of the visualized skull or soft tissues. Aging or evolving of known left PCA vascular territory infarct. Large area of hypodensity is now seen in this region. No hemorrhage noted. Underlying atrophy with periventricular and at additional areas of small vessel ischemic change Small cavernoma posterior right parietal lobe     Ct Head Wo Contrast    Result Date: 10/13/2020  EXAMINATION: CT OF THE HEAD WITHOUT CONTRAST  10/13/2020 6:52 am TECHNIQUE: CT of the head was performed without the administration of intravenous contrast. Dose modulation, iterative reconstruction, and/or weight based adjustment of the mA/kV was utilized to reduce the radiation dose to as low as reasonably achievable. COMPARISON: None. HISTORY: ORDERING SYSTEM PROVIDED HISTORY: stroke TECHNOLOGIST PROVIDED HISTORY: Has a \"code stroke\" or \"stroke alert\" been called? ->Yes Reason for exam:->stroke Reason for Exam: stroke Acuity: Acute Type of Exam: Initial Relevant Medical/Surgical History: Altered Mental Status ([facility] after wife found him on floor, unsure how long pt confused unable to follow directions and answer questions correctly ) FINDINGS: BRAIN/VENTRICLES: Ventricles are midline in position and mildly enlarged in size. There is moderate periventricular hypodensity seen.   Patchy and confluent hypodensity is for exam:->stroke Reason for Exam: stroke Acuity: Acute Type of Exam: Initial Relevant Medical/Surgical History: Altered Mental Status (pt brought to ed by [facility] after wife found him on floor, unsure how long pt confused unable to follow directions and answer questions correctly ) FINDINGS: CTA NECK: AORTIC ARCH/ARCH VESSELS: No dissection or arterial injury. No significant stenosis of the brachiocephalic or subclavian arteries. CAROTID ARTERIES: There is 70% stenosis at the origin of the right common carotid artery. There is 50% stenosis at the origin of the right internal carotid artery. There is 30% stenosis at the origin of the left internal carotid artery. VERTEBRAL ARTERIES: There is stenosis along the right cervical vertebral artery, most severe at the distal V1 segment with up to 70% stenosis. No acute abnormality or flow stenosis in the cervical portion of the left vertebral artery. SOFT TISSUES: There is moderate emphysema. No cervical or superior mediastinal lymphadenopathy. The larynx and pharynx are unremarkable. No acute abnormality of the salivary glands. There is 1 cm left thyroid nodule, does not require follow-up due to small size. BONES: No acute osseous abnormality. CTA HEAD: ANTERIOR CIRCULATION: There is 75% stenosis at the origin of an M2 branch of the right MCA. No significant stenosis of the intracranial internal carotid, anterior cerebral, or left middle cerebral arteries. No aneurysm. POSTERIOR CIRCULATION: There is partial occlusion in the mid to distal V4 segment of the right vertebral artery. There is up to 40% stenosis in mid V4 segment of the left vertebral artery. There is partial occlusion in the P2 segment of the left PCA. No significant stenosis of the basilar, or right posterior cerebral arteries. No aneurysm. OTHER: No dural venous sinus thrombosis on this non-dedicated study.  BRAIN: There is a 7 mm focus of hyperattenuation in the right parietal periventricular white matter, likely corresponding to a cavernoma, stable in size since MRI brain November 14, 2017. There is mild parenchymal volume loss. There is periventricular white matter low attenuation, likely related to mild to moderate chronic microvascular disease. There is mild hydrocephalus, stable. No mass effect or midline shift. No extra-axial fluid collection. The gray-white differentiation is maintained. There is mild mucosal thickening in the paranasal sinuses. There are moderate bilateral mastoid effusions. Partial occlusion in the mid to distal V4 segment of the right vertebral artery. There is up to 40% stenosis in mid V4 segment of the left vertebral artery. Partial occlusion in the P2 segment of the left PCA. 75% stenosis at the origin of the a M2 branch of the right MCA. 70% stenosis at the origin of the right common carotid artery. 50% stenosis at the origin of the right internal carotid artery. 30% stenosis at the origin of the left internal carotid artery. 70% severe at the distal V1 segment of the right vertebral artery. Results were reported to Dr. Octavio Schilling at 4:07 p.m. on October 13, 2020. Mri Brain Wo Contrast    Result Date: 10/13/2020  EXAMINATION: MRI OF THE BRAIN WITHOUT CONTRAST  10/13/2020 6:16 pm TECHNIQUE: Multiplanar multisequence MRI of the brain was performed without the administration of intravenous contrast. COMPARISON: None. HISTORY: ORDERING SYSTEM PROVIDED HISTORY: cva TECHNOLOGIST PROVIDED HISTORY: Reason for exam:->cva Reason for Exam: cva disorientation aphasia Acuity: Acute Type of Exam: Unknown FINDINGS: INTRACRANIAL STRUCTURES/VENTRICLES: There is an acute infarct within the left occipitotemporal lobe, extending into the ipsilateral thalamus. This measures at least 9 cm in diameter. No mass, shift, or acute intracranial bleed is identified. There is volume loss with mild-to-moderate chronic white matter microvascular ischemic change.   Normal

## 2020-10-22 NOTE — FLOWSHEET NOTE
Patient much calmer at this time, actually talking in sentences, resp 20 at this time, rhonchi still heard but not as prominent as earlier in the shift mostly upper anterior lobes

## 2020-10-22 NOTE — PROGRESS NOTES
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 Pt's wife was present during session. Dysphagia Treatment:  Current Diet Level: Full liquid diet  Tolerance of Current Diet Level: Per RN report Pt with effortful swallow noted when taking medication with puree and thin liquids. Assessment of Texture Tolerance:  -Impressions: Pt was seen sitting upright in bed, trials of puree and thin liquids were provided. Puree textures revealed mildly prolonged A-P propulsion with delayed swallow initiation. Facial grimacing was noted with both puree and thin liquid trials, Pt unable to verbally express if due to pain. Thin liquids via cup revealed immediate coughing on first trial, no further overt clinical s/s of aspiration/penetration were noted, however delayed swallow initiation was assessed. At this time, recommend continuation of current diet with diet tolerance monitoring. If facial grimacing/suspected pain with PO intake continues, further assessment is indicated.      Diet and Treatment Recommendations:  1.) Continue full liquid diet  2.) If difficulty is noted, recommend downgrade to NPO with ongoing swallowing assessment   3.) Assistance with feeding     Dysphagia Goals, addressed this date:  1.) Pt will tolerate recommended diet without s/s of aspiration (ongoing 10/22/2020)   2.) If clinical symptoms of penetration/aspiration continue to be noted,Pt will tolerate MBS to r/o aspiration and determine appropriate diet/liquid level. (ongoing 10/22/2020)   3.) Pt will tolerate diet advance to least restricted diet, as clinically indicated, with no signs of aspiration (ongoing 10/22/2020)   4.) Pt will improve oral motor function via bolus control exercises 5/5 (ongoing 10/22/2020)     Speech Language Treatment:  Impressions: Improved alertness this date. Pt able to state short phrases this date however Pt with perseveration on \"beautiful\" and \"weather. \"    Speech Language STG, addressed this date: 1. Pt will improve basic naming skills (automatics, confrontational naming, responsive naming) to 80% given min cues (ongoing 10/22/2020)    -Pt did not state his name this date   -Pt appeared to recognize his wife, however unable to state her name    2. Pt will improve auditory processing/comprehension of commands and questions to 80%, via graded tasks (ongoing 10/22/2020)    -One step commands: 1/3   -Basic yes/no questions: 2/2    3. Pt will improve verbal expression of personal hx to 80% given min cues (ongoing 10/22/2020)    -Pt with perseveration on the words \"beautiful\" and \"weather\"   -Occasionally able to produce short phrases however errors noted with syntax     4. Pt will participate in further assessment of speech-language/cognition as able with goals to be added as clinically indicated (ongoing 10/22/2020)    -Significant visual deficits and right in-attention/neglect   -Disoriented to all aspects this date    Patient/Family Education:Education given to the Pt/daughter and nurse, who verbalized understanding    Assessment: Patient progressing toward goals    Plan: Continue as per plan of care    Discharge Recommendations: Pt will benefit from continued skilled Speech Therapy for Speech and Dysphagia services, prior to returning home. Treatment time  Timed Code Treatment Minutes: 0 minutes  Total Treatment time: 24 minutes    If patient discharges prior to next session this note will serve as a discharge summary.       Chano Islas Jasvir Fraser., [address] [facility]  Speech-Language Pathologist

## 2020-10-22 NOTE — FLOWSHEET NOTE
Patient has tendency y\to drop his Sats, encourage patient to take a deep breathand his sats will return to 93-94%    Patient has his hospital gown off at this time, spoke with his daughter and this was his behavior in  ICU statring every night at around 1800

## 2020-10-22 NOTE — PROGRESS NOTES
NEUROLOGY FOLLOWUP    HISTORY OF PRESENT ILLNESS :     Meron Almaraz is a 76 y.o. male   History was obtained from the dictations in the chart. Patient had some GI bleed and hematemesis 4 days ago. Patient had an EGD  which showed bleeding esophagitis. Patient is now on IV Protonix and his antiplatelet therapy has been stopped temporarily. Patient's ammonia level was transiently high but now it is better. Patient has been started back on Plavix today. Detailed history:  Apparently patient has severe hypertension but has not been taking his medications for more than 6 months. He developed some confusion 2 days ago. Later he developed right-sided weakness and had speech difficulty. He was unable to talk and had difficulty with comprehension as well. Onset of symptoms was fairly acute and abrupt and moderately severe. No clear aggravating or relieving factors.   Patient has never had any similar symptoms in the past.  Comorbidities include coronary artery disease hypertension hyperlipidemia and type 2 diabetes    REVIEW OF SYSTEMS       Constitutional:  []   Chills   [x]  Fatigue   []  Fevers   []  Malaise   []  Weight loss     [] Denies all of the above    Respiratory:   []  Cough    []  Shortness of breath         [x] Denies all of the above     Cardiovascular:   []  Chest pain    []  Exertional chest pressure/discomfort           [] Palpitations    []  Syncope     [x] Denies all of the above    Past Medical History:   Diagnosis Date    Acute MI ([facility] [address] 2000    CAD (coronary artery disease)     Hyperlipidemia     Hypertension     Type II or unspecified type diabetes mellitus without mention of complication, not stated as uncontrolled     diet controlled     Family History   Problem Relation Age of Onset    Heart Disease Father     High Blood Pressure Father     High Cholesterol Father     Heart Disease Maternal Grandfather     Heart Disease Paternal Grandfather      Social History     Socioeconomic History    Marital status:      Spouse name: None    Number of children: None    Years of education: None    Highest education level: None   Occupational History    None   Social Needs    Financial resource strain: None    Food insecurity     Worry: None     Inability: None    Transportation needs     Medical: None     Non-medical: None   Tobacco Use    Smoking status: Former Smoker     Packs/day: 0.00     Years: 20.00     Pack years: 0.00     Last attempt to quit: 1982     Years since quittin.6    Smokeless tobacco: Never Used   Substance and Sexual Activity    Alcohol use: Yes     Alcohol/week: 6.0 standard drinks     Types: 6 Cans of beer per week     Comment: Occ    Drug use: No    Sexual activity: None   Lifestyle    Physical activity     Days per week: None     Minutes per session: None    Stress: None   Relationships    Social connections     Talks on phone: None     Gets together: None     Attends Zoroastrianism service: None     Active member of club or organization: None     Attends meetings of clubs or organizations: None     Relationship status: None    Intimate partner violence     Fear of current or ex partner: None     Emotionally abused: None     Physically abused: None     Forced sexual activity: None   Other Topics Concern    None   Social History Narrative    None        PHYSICAL EXAMINATION      BP (!) 140/80   Pulse 70   Temp 98.6 °F (37 °C) (Temporal)   Resp 18   Ht 6' (1.829 m)   Wt 187 lb 3.2 oz (84.9 kg)   SpO2 98%   BMI 25.39 kg/m²   This is a well-nourished patient in no acute distress  Patient is awake and alert but he has expressive aphasia. Visual field examination shows a right homonymous hemianopsia. Mild right facial droop. Tongue is in the midline. Motor examination shows right hemiplegia with a strength of 1-2 over 5.   Tone was diminished on the right side. Coordination is impaired on the right side and normal on the left. Reflexes absent on the right and 1+ on the left. Right plantar reflexes extensor rest the left is flexor. Gait impaired. No carotid bruit. No neck stiffness. DATA :  LABS:  General Labs:    CBC:   Lab Results   Component Value Date    WBC 6.7 10/18/2020    RBC 4.19 10/18/2020    HGB 11.2 10/22/2020    HCT 32.2 10/22/2020    MCV 88.0 10/18/2020    MCH 30.5 10/18/2020    MCHC 34.6 10/18/2020    RDW 13.8 10/18/2020     10/18/2020    MPV 8.4 10/18/2020     BMP:    Lab Results   Component Value Date     10/22/2020    K 3.7 10/22/2020    K 3.9 10/13/2020     10/22/2020    CO2 21 10/22/2020    BUN 16 10/22/2020    LABALBU 3.3 10/22/2020    CREATININE 1.0 10/22/2020    CALCIUM 8.9 10/22/2020    GFRAA >60 10/22/2020    GFRAA >60 02/25/2012    LABGLOM >60 10/22/2020    GLUCOSE 134 10/22/2020     RADIOLOGY REVIEW:  I have reviewed radiology image(s) and reports(s) of: MRI brain      IMPRESSION :  Acute left posterior cerebral artery infarction  Global aphasia with both expression and comprehension difficulties  Right hemiparesis  Ataxia  Hypertension   Hyperlipidemia  CT angiogram showed intracranial stenosis involving the posterior circulation  Echocardiogram showed left ventricular hypertrophy  GI bleed, status post EGD  Patient has been restarted back on Plavix therapy  Ammonia level is now improved  Patient Active Problem List   Diagnosis    Essential hypertension, benign    Coronary atherosclerosis of native coronary artery    Palpitations    Coronary artery disease due to lipid rich plaque    Abnormal transaminases    Diverticulitis    Unstable angina (HCC)    Midline thoracic back pain    Mixed hyperlipidemia    Bilateral carotid artery stenosis    Acute CVA (cerebrovascular accident) ([address]       RECOMMENDATIONS :  Discussed with patient and his wife   Discussed with patient's nurse.     Discussed with Dr. Ioana Sapp. Patient has been restarted back on antiplatelet therapy. He is now on Plavix. Statin therapy  Okay to discharge to inpatient rehab unit from neurological standpoint. Please note a portion of this chart was generated using dragon dictation software. Although every effort was made to ensure the accuracy of this automated transcription, some errors in transcription may have occurred.          Jennifer Mora M.D.

## 2020-10-22 NOTE — PROGRESS NOTES
Speech Language Pathology    Mei Captain Rojas  1945    Attempted to see Pt for speech/dysphagia therapy. Per daughter report, Pt with increased agitation this date. Pt sleeping soundly at time of attempt, per discussion with daughter will hold at this time and follow-up as schedule allows and as Pt is appropriate.     Jeannie Severino M.A., [address] [place] [address]  Speech-Language Pathologist

## 2020-10-22 NOTE — PROGRESS NOTES
Patient restless overnight but cooperative with staff, pain control with PO meds, patient able to help some with turning. Patent henry in place, output good. VSS. Patient bathed and linens changed.      Susana Person RN, CRRN, 10/22/2020,6:26 AM

## 2020-10-23 PROBLEM — E44.1 MILD MALNUTRITION (HCC): Chronic | Status: ACTIVE | Noted: 2020-01-01

## 2020-10-23 PROBLEM — J96.01 ACUTE RESPIRATORY FAILURE WITH HYPOXIA (HCC): Status: ACTIVE | Noted: 2020-01-01

## 2020-10-23 PROBLEM — I63.232 ARTERIAL ISCHEMIC STROKE, ICA, LEFT, ACUTE (HCC): Status: ACTIVE | Noted: 2020-01-01

## 2020-10-23 NOTE — CONSULTS
In patient Neurology consult        [facility] Neurology      MD Stephane Smith  1945    Date of Service: 10/23/2020    Referring Physician: Chuck Lozano MD    Most of the history was obtained from detailed chart reviewing and discussion with the patient's nurse and Daughter. The patient is currently confused and unable to provide me with accurate history. Reason for the consult and CC: Acute encephalopathy. HPI:   The patient is a 76y.o.  years old male with multiple medical problems who was initially admitted to this hospital on 13 October with acute confusion and right-sided weakness with aphasia. Further work-up with MRI of the brain showed acute ischemic left PCA stroke in the distribution of the posterior temporal and occipital region. The patient also had a CT of the head and neck at that time which showed right vertebral occlusion and diffuse intracranial stenosis and atherosclerosis. Patient was admitted to acute rehab for further recovery. Last night he became acutely obtunded and lethargic. Degree was severe. Duration was persistent. Other associated symptoms included labored breathing. He was somewhat agitated and the patient received Ativan at 6 AM today. He was transported to the ICU. The patient is currently unresponsive. He has right gaze preference and labored breathing. Unable to give any more history. Degree is severe. No other relieving or aggravating factors. No clear triggers so far. No witnessed seizure. Other review of system was limited.     Family History   Problem Relation Age of Onset    Heart Disease Father     High Blood Pressure Father     High Cholesterol Father     Heart Disease Maternal Grandfather     Heart Disease Paternal Grandfather          Past Medical History:   Diagnosis Date    Acute MI ([facility] [address] 2000    CAD (coronary artery disease)     Hyperlipidemia     Hypertension     Type II or unspecified type diabetes right  Tone: Normal tone. No rigidity. Reflexes: 1+ throughout   Planters: flexor bilaterally. Coordination: NT due to confusion  Sensation: NT due to confusion  Gait/Posture: NT due to confusion    Data:  LABS:   Lab Results   Component Value Date     10/22/2020    K 3.7 10/22/2020    K 3.9 10/13/2020     10/22/2020    CO2 21 10/22/2020    BUN 16 10/22/2020    CREATININE 1.0 10/22/2020    GFRAA >60 10/22/2020    GFRAA >60 02/25/2012    LABGLOM >60 10/22/2020    GLUCOSE 134 10/22/2020    PHOS 2.9 10/22/2020    MG 2.50 10/17/2020    CALCIUM 8.9 10/22/2020     Lab Results   Component Value Date    WBC 9.0 10/23/2020    RBC 4.02 10/23/2020    HGB 12.3 10/23/2020    HCT 35.5 10/23/2020    MCV 88.1 10/23/2020    RDW 13.8 10/23/2020     10/23/2020     Lab Results   Component Value Date    INR 1.20 (H) 10/14/2020    PROTIME 14.0 (H) 10/14/2020       Neuroimaging were independently reviewed by me. Reviewed MRI from October 13. Reviewed notes from different physicians  Reviewed lab and blood testing    Impression:  Acute encephalopathy, severe. So far no specific etiology. Rule out extension of his recent stroke, hemorrhagic conversion or nonconvulsive status epilepticus. Other metabolic causes for encephalopathy should be excluded including underlying infection. Hypertensive encephalopathy is another option  Recent left PCA stroke  Hypertension, not controlled. CAD  Hyperlipidemia    Recommendation:  CT head and EEG stat to rule out any hemorrhagic conversion or nonconvulsive seizure or status epilepticus  If CT and EEG are unrevealing, would recommend MRI of the brain. He may then need to be intubated given his respiratory distress today and agitation  Avoid any sedatives  Blood pressure monitor and goal blood pressure should be in the 180-90 for now if CT showed no ICH.    Aspiration precautions  Hydration  Can give aspirin HI  DVT and GI prophylaxis if CT head showed no hemorrhagic conversion  Neurochecks  So far he is full code  Poor prognosis  Discussed with his family ICU team and his nurse. We will follow. Thank you for referring such patient. If you have any questions regarding my consult note, please don't hesitate to call me. Thais Richardson MD  [phone]    This dictation was generated by voice recognition computer software.  Although all attempts are made to edit the dictation for accuracy, there may be errors in the  transcription that are not intended

## 2020-10-23 NOTE — H&P
Hospital Medicine History & Physical      PCP: Amena Lepe    Date of Admission: 10/23/2020    Date of Service: Pt seen/examined on today and Admitted to Inpatient with expected LOS greater than two midnights due to medical therapy. Chief Complaint: Shortness of breath      History Of Present Illness:  Leroy Mancia is 76 y.o. male who presented with complaint of shortness of breath. Symptom onset was acute for a time period of 1 day. The severity is described as moderate to severe. The course of his symptoms over time is worsening. The symptoms improved with oxygen and worsened with none. The patient's symptom is associated with obtunded. Leroy Mancia is 76 y.o. male with history of recent acute CVA. Patient was just discharged to inpatient acute rehab unit. I was called shortly after midnight to evaluate the patient for increased work of breathing. At the time of my interview, patient was not able to give history. He was totally obtunded not responsive to sternal rub. His abdominal muscles visibly contracted as he tries to take a deep breath.    Decision is made to admit him back to the ICU as the nursing staff on the rehab unit was not comfortable to take care of him due to his labored breathing      Past Medical History:          Diagnosis Date    Acute MI ([address] 2000    CAD (coronary artery disease)     Hyperlipidemia     Hypertension     Type II or unspecified type diabetes mellitus without mention of complication, not stated as uncontrolled     diet controlled       Past Surgical History:          Procedure Laterality Date    BACK SURGERY  2018    steel bracket inbetween vertebrae    CARDIAC SURGERY      stents    CORONARY ANGIOPLASTY WITH STENT PLACEMENT  2000, 2/24/2012    X4 previously 2000; X1 2/24/2012    ESOPHAGOSCOPY      HAND SURGERY  5/09    HAND SURGERY Left 2009    UPPER GASTROINTESTINAL ENDOSCOPY N/A 10/19/2020    EGD SUBMUCOSAL INJECTION OF EPINEPHRINE INTO GE JUNCTION ULCER performed by Donovan Trevino MD at [facility]       Medications Prior to Admission:      Prior to Admission medications    Medication Sig Start Date End Date Taking? Authorizing Provider   labetalol (NORMODYNE) 200 MG tablet Take 1 tablet by mouth every 12 hours 10/22/20   Daphne Westfall MD   hydrALAZINE (APRESOLINE) 25 MG tablet Take 3 tablets by mouth every 8 hours 10/22/20   Daphne Westfall MD   Cyanocobalamin (VITAMIN B 12 PO) Take by mouth    Historical Provider, MD   losartan (COZAAR) 50 MG tablet Take 1 tablet by mouth daily 10/16/19   Wyatt Bethea MD   clopidogrel (PLAVIX) 75 MG tablet Take 1 tablet by mouth daily 7/22/19   Wyatt Bethea MD   rosuvastatin (CRESTOR) 10 MG tablet TAKE 1 TABLET BY MOUTH EVERY DAY 2/27/19   Wyatt Bethea MD   amLODIPine (NORVASC) 10 MG tablet Take 1 tablet by mouth daily  Patient taking differently: Take 10 mg by mouth daily Indications: currently only taking 5mg 11/4/19 7/5/18   Wyatt Bethea MD   Coenzyme Q10 (COQ10) 200 MG CAPS Take 1 tablet by mouth daily 9/28/17   Wyatt Bethea MD   nitroGLYCERIN (NITROLINGUAL) 0.4 MG/SPRAY 0.4 mg spray Place 1 spray under the tongue every 5 minutes as needed for Chest pain 4/5/17   Wyatt Bethea MD   sildenafil (VIAGRA) 100 MG tablet Take 1 tablet by mouth as needed for Erectile Dysfunction 5/11/16   Wyatt Bethea MD       Allergies:  Patient has no known allergies. Social History:      The patient currently lives at home    TOBACCO:   reports that he quit smoking about 38 years ago. He smoked 0.00 packs per day for 20.00 years. He has never used smokeless tobacco.  ETOH:   reports current alcohol use of about 6.0 standard drinks of alcohol per week. E-Cigarettes Vaping or Juuling     Questions Responses    Vaping Use Never User    Start Date     Does device contain nicotine?      Quit Date     Vaping Type             Family History:      Reviewed in detail and AST, ALT, BILIDIR, BILITOT, ALKPHOS in the last 72 hours. No results for input(s): INR in the last 72 hours. No results for input(s): [place] in the last 72 hours. Urinalysis:      Lab Results   Component Value Date    NITRU Negative 10/17/2020    WBCUA 1 10/17/2020    BACTERIA Rare 02/28/2015    RBCUA 3 10/17/2020    BLOODU Negative 10/17/2020    SPECGRAV 1.024 10/17/2020    GLUCOSEU Negative 10/17/2020         ASSESSMENT & PLAN:    Acute respiratory failure with hypoxia -admit back to the ICU. Continue supportive care. We will go ahead and intubate him if his breathing worsens however at this time it appears he is improving. Monitor closely. Check ABG. acute right hemiparesis and acute encephalopathy secondary to left sided infarct  MRI with infarct in the posterior circulation  CT angio of head and neck showed multiple intracranial stenoses  On aspirin and Plavix, A1c 6.4  Echo normal, no PFO  Patient will need 30-day Holter monitor  Continue telemetry     Acute kidney injury strongly suspect secondary to the blood pressure fluctuations  For now we will hold valsartan  Nephrology consulted, appreciate recommendation     Delirium   multifactorial, delirium precautions     Hypertension malignant with endorgan damage  Continue labetalol, amlodipine.   Continue to dysphagia diet  Continue hydralazine  Nephrology is following     GI bleed/ acute blood loss anemia    EGD   Ulcerative esophagitis with clot and diffuse oozing from ulceration.  No V.V.                 Controlled with epi injection.                 Large hiatal hernia               2 clean based ulcers in the bulb  - monitor H and H  IV PPI gtt        Elevated troponin  EKG shows some new T inversions in V3 and V4 as compared to previous EKG  He does not have any chest pain  Troponins more or less flat  Seen by cardiology  On aspirin and Plavix  On Lipitor full dose     Coronary  artery disease  On aspirin and Plavix  - on hold     Prediabetes  Start Metformin on discharge     L superior mediastinal widening on chest x-ray  His radial pulses are equal more importantly on the CT angiogram of the neck the chest x-ray report acknowledges that that area was included on the CT angiogram and was visualized and showed only prominent vasculature ends at this time will not pursue any further testing     Seen by physical medicine and rehab for ARU placement if blood pressure remained stable over the next 24 hours can be discharged on Monday      DVT Prophylaxis: Lovenox  Diet: Diet NPO Effective Now  Code Status: Full Code    PT/OT Eval Status: Not ordered    Dispo - Admit to the ICU. Mj Thompson MD    Thank you Kiya Rincon for the opportunity to be involved in this patient's care. If you have any questions or concerns please feel free to contact me at [phone].

## 2020-10-23 NOTE — PROGRESS NOTES
4 Eyes Skin Assessment     The patient is being assess for  Transfer to New Unit    I agree that 2 RN's have performed a thorough Head to Toe Skin Assessment on the patient. ALL assessment sites listed below have been assessed. Areas assessed by both nurses:   [x]   Head, Face, and Ears   [x]   Shoulders, Back, and Chest  [x]   Arms, Elbows, and Hands   [x]   Coccyx, Sacrum, and Ischum  [x]   Legs, Feet, and Heels        Does the Patient have Skin Breakdown?   No         Mike Prevention initiated:  Yes   Wound Care Orders initiated:  No      [facility] nurse consulted for Pressure Injury (Stage 3,4, Unstageable, DTI, NWPT, and Complex wounds):  No      Nurse 1 eSignature: Electronically signed by Nasreen David RN on 10/23/20 at 2:46 AM EDT      Nurse 2 eSignature: Electronically signed by Rishi Red RN on 10/23/20 at 6:26 AM EDT

## 2020-10-23 NOTE — PLAN OF CARE
Nutrition Problem #1: Mild malnutrition  Intervention: Food and/or Nutrient Delivery: Continue NPO  Nutritional Goals: Nutrition support or meal / supp intake greater than 50%

## 2020-10-23 NOTE — PROGRESS NOTES
Hospitalist Progress Note      PCP: Lyn Louis    Date of Admission: 10/23/2020    Chief Complaint: Confusion and right-sided weakness    Hospital Course:   76 y.o. male with PMHx significant for hypertension, coronary artery disease s/p stents hyperlipidemia, balance issues, mild cognitive impairment mated to the hospital after he had a fall and acting abnormally for the last 2 to 3 days  History obtained from patient and wife although patient does not seem to remember much and is not able to give that much of a detailed history  Wife called EMS this morning as patient was noted to be on the ground unclear how he ended up there. She states over the last 48 hours he has not been feeling well  She noticed that he is weak generally but more on the right side and very confused  This morning patient was at the mental status that he has been for the last 2 days with no worsening  Patient denies any headache  Wife has not noticed any facial distortions however as noted the last 2 days he keeps asking the same question does not seem to remember much and has been acting very different since Sunday  She did he denied any chest pain or shortness of breath  No headache  EMS reported that there was a right upper extremity drift in the picked him up at around 6:15 AM  He has not been compliant with his medication. He was discharged to [facility], [place], operated by [facility] rehab and had an immediate rapid response on 10/22. He got some morphine and some labetalol and his pressure got better and he calmed down. Later that day he was talking to his daughter. At night he worsened and was SOB, labored breathing and sounded stridorous. He was readmitted to the inpatient hospital to ICU. This morning (10/23/2020) he is a little less responsive than previous. CT scan done is essentially the same and no bleed. He had an eeg that showed general slowing, going to be intubated to get mri.     Subjective  More obtunded and snoring 02/28/2015    RBCUA 191 10/23/2020    BLOODU LARGE 10/23/2020    SPECGRAV 1.022 10/23/2020    GLUCOSEU Negative 10/23/2020       Radiology:  XR CHEST PORTABLE   Final Result   Endotracheal tube in good position. Improved aeration of both lungs with decreased but persistent left greater   than right lower lobe airspace disease         CT HEAD WO CONTRAST   Final Result   Motion limited. No obvious hemorrhage      Recent left occipital lobe-PCA infarct, similar to prior      Small cavernoma on the right, similar to prior         XR CHEST PORTABLE   Final Result   New bilateral lower lobe disease favored to be due to edema or atelectasis   given the symmetry although pneumonia could have this appearance. MRI BRAIN WO CONTRAST    (Results Pending)       Assessment/Plan:    Active Hospital Problems    Diagnosis Date Noted    Acute respiratory failure with hypoxia ([address] [J96.01] 10/23/2020    Acute encephalopathy [G93.40]     New onset seizure ([address] [R56.9]     Encephalopathy, hypertensive [I67.4]     Arterial ischemic stroke, ICA, left, acute ([address] [I63.232] 10/22/2020    HTN (hypertension), benign [I10] 08/02/2011     acute right hemiparesis and acute encephalopathy secondary to left sided infarct  MRI with infarct in the posterior circulation  CT angio of head and neck showed multiple intracranial stenoses, repeat ct is unchanged. Resumed on Plavix, A1c 6.4  Echo normal, no PFO  Continue telemetry    Malignant HTN  - on cleviprex gtt  -bp better  -suspect his is driving the delerium    Acute kidney injury strongly suspect secondary to the blood pressure fluctuations  For now we will hold valsartan  Nephrology consulted, appreciate recommendation    Delirium  - most likely related to stroke and poorly controlled BP  He does better when his BP is better. - work up continues.   UA ordered, CXR shows some right sided airspace disease, he may have aspirated, however he does not have respiratory failure    Hypertension malignant with endorgan damage  Continue labetalol, IV hydralazine. npo  Nephrology is following    GI bleed/ acute blood loss anemia    EGD   Ulcerative esophagitis with clot and diffuse oozing from ulceration. No V.V. Controlled with epi injection. Large hiatal hernia               2 clean based ulcers in the bulb  - monitor H and H  IV PPI gtt       Elevated troponin  EKG shows some new T inversions in V3 and V4 as compared to previous EKG  He does not have any chest pain  Troponins more or less flat  Seen by cardiology  On aspirin and Plavix  On Lipitor full dose    Coronary  artery disease  On aspirin and Plavix  - on hold    Prediabetes  Start Metformin on discharge    L superior mediastinal widening on chest x-ray  His radial pulses are equal more importantly on the CT angiogram of the neck the chest x-ray report acknowledges that that area was included on the CT angiogram and was visualized and showed only prominent vasculature ends at this time will not pursue any further testing      DVT Prophylaxis: sc lovenox   Diet: Diet NPO Effective Now  Code Status: Full Code      Dispo - he is going to be intubated to be able to obtain an MRI and see if the stroke is worse than ct shows.       Dianne Rubio MD

## 2020-10-23 NOTE — PROGRESS NOTES
Abdominal retractions and stridorous breathing continue. Dr. Babs Dowd notified. Hospitalist messaged through [facility] and paged via . Responded via [employer] \"ok\". Hospitalist states he will come \"as soon as possible\". Respiratory therapist in to provide epinepherine inhalation. No relief of stridor or retractions. O2 saturation after treatment 99%. Aerosol mist provided via face mask. Patient compliant with keeping mask on.

## 2020-10-23 NOTE — CONSULTS
ENDOSCOPY     Current Medications:    Current Facility-Administered Medications: clopidogrel (PLAVIX) tablet 75 mg, 75 mg, Oral, Daily  labetalol (NORMODYNE) tablet 200 mg, 200 mg, Oral, 2 times per day  sodium chloride flush 0.9 % injection 10 mL, 10 mL, Intravenous, 2 times per day  sodium chloride flush 0.9 % injection 10 mL, 10 mL, Intravenous, PRN  acetaminophen (TYLENOL) tablet 650 mg, 650 mg, Oral, Q6H PRN **OR** acetaminophen (TYLENOL) suppository 650 mg, 650 mg, Rectal, Q6H PRN  polyethylene glycol (GLYCOLAX) packet 17 g, 17 g, Oral, Daily PRN  promethazine (PHENERGAN) tablet 12.5 mg, 12.5 mg, Oral, Q6H PRN **OR** ondansetron (ZOFRAN) injection 4 mg, 4 mg, Intravenous, Q6H PRN  enoxaparin (LOVENOX) injection 40 mg, 40 mg, Subcutaneous, Daily  atorvastatin (LIPITOR) tablet 80 mg, 80 mg, Oral, Nightly  LORazepam (ATIVAN) injection 1 mg, 1 mg, Intravenous, Q4H PRN  labetalol (NORMODYNE;TRANDATE) injection 10 mg, 10 mg, Intravenous, Q4H PRN  hydrALAZINE (APRESOLINE) injection 5 mg, 5 mg, Intravenous, Q4H PRN  clevidipine (CLEVIPREX) infusion, 2 mg/hr, Intravenous, Continuous  morphine (PF) injection 2 mg, 2 mg, Intravenous, Q4H PRN  propofol 1000 MG/100ML injection, , ,   propofol injection, 10 mcg/kg/min, Intravenous, Titrated    No Known Allergies    Social History:    TOBACCO:   reports that he quit smoking about 38 years ago. He smoked 0.00 packs per day for 20.00 years. He has never used smokeless tobacco.  ETOH:   reports current alcohol use of about 6.0 standard drinks of alcohol per week. Patient currently lives independently  Environmental/chemical exposure: None known    Family History:       Problem Relation Age of Onset    Heart Disease Father     High Blood Pressure Father     High Cholesterol Father     Heart Disease Maternal Grandfather     Heart Disease Paternal Grandfather      REVIEW OF SYSTEMS:    ROS is unobtainable due to his critical illness.       Objective:   PHYSICAL EXAM: VITALS:  /64   Pulse 79   Temp 98 °F (36.7 °C) (Temporal)   Resp 23   Ht 6' (1.829 m)   Wt 178 lb 5.6 oz (80.9 kg)   SpO2 96%   BMI 24.19 kg/m²      24HR INTAKE/OUTPUT:      Intake/Output Summary (Last 24 hours) at 10/23/2020 1546  Last data filed at 10/23/2020 1422  Gross per 24 hour   Intake --   Output 675 ml   Net -675 ml     CONSTITUTIONAL: Nonresponsive   NECK:  Supple, symmetrical, trachea midline, no adenopathy, thyroid symmetric, not enlarged and no tenderness, skin normal  LUNGS: Labored breathing and a Cheyne-Lynne respiratory pattern and clear to auscultation. No accessory muscle use  CARDIOVASCULAR: S1 and S2, no edema and no JVD  ABDOMEN:  normal bowel sounds, non-distended and no masses palpated, and no tenderness to palpation. No hepatospleenomegaly  LYMPHADENOPATHY:  no axillary or supraclavicular adenopathy. No cervical adnenopathy  PSYCHIATRIC: Nonresponsive  MUSCULOSKELETAL: No obvious misalignment or effusion of the joints. No clubbing, cyanosis of the digits. SKIN:  normal skin color, texture, turgor and no redness, warmth, or swelling.  No palpable nodules    DATA:    Old records have been reviewed    CBC:  Recent Labs     10/22/20  0506 10/22/20  1133 10/23/20  0906 10/23/20  1134   WBC  --   --   --  9.0   RBC  --   --   --  4.02*   HGB 12.3* 11.2* 10.7* 12.3*   HCT 35.3* 32.2*  --  35.5*   PLT  --   --   --  235   MCV  --   --   --  88.1   MCH  --   --   --  30.6   MCHC  --   --   --  34.8   RDW  --   --   --  13.8      BMP:  Recent Labs     10/21/20  0503 10/22/20  0506 10/23/20  1134    140 143   K 3.2* 3.7 3.9    108 107   CO2 21 21 23   BUN 20 16 22*   CREATININE 1.2 1.0 1.1   CALCIUM 8.4 8.9 9.3   GLUCOSE 137* 134* 140*      ABG:  Recent Labs     10/23/20  0906   PHART 7.411   VJM5KEC 37.1   PO2ART 73.8*   VHU4HVF 23.6   L9WYWSSR 95   BEART -1       No results found for: BNP  Lab Results   Component Value Date    CKTOTAL 80 10/18/2020    TROPONINI 0.03 (H) 10/15/2020       Cultures:     Abx:    Radiology Review:  All pertinent images / reports were reviewed as a part of this visit. Assessment:     1. Acute respiratory failure  2. Prior CVA  3. Hypertensive emergency  4. Acute encephalopathy  5. Aspiration pneumonia    I have reviewed laboratories, medical records and images for this visit  Chest imaging reveals bibasilar density which could represent aspiration pneumonia  He is afebrile with no leukocytosis. Does not need antibiotics at this time  EEG was done to rule out status epilepticus. This has shown diffuse slowing  He is going to need an MRI  Likely to need intubation in order to undergo MRI  We will use Cleviprex to control blood pressure with a goal of systolic pressure between 160 and 170  At the time of intubation we will start him on propofol infusion for his comfort as well  Discussed the plan of care with his family at the bedside    Total critical care time caring for this patient with life threatening illness, including direct patient contact, management of life support systems, review of data including imaging and labs, discussions with other team members and physicians is at least 32 minutes so far today, excluding procedures.

## 2020-10-23 NOTE — CARE COORDINATION
Chart reviewed for discharge planning. Patient from home with spouse prior to admit on 10/13/2020. Patient obtained insurance authorization and admitted to ARU on 10/22/2020. Patient will need another PT/OT evaluation and pre-cert to return to ARU when medically appropriate. *Case management will continue to follow progress and update discharge plan as needed.     FELICIANO NaylorN, RN  [phone]

## 2020-10-23 NOTE — DISCHARGE SUMMARY
Physical Medicine & Rehabilitation  Discharge Summary     Patient Identification:  Brenda Pulido  : 1945  Admit date: 10/22/2020  Discharge date: 10/23/2020  Attending provider: No att. providers found        Primary care provider: Elvia Chi     Discharge Diagnoses:   Patient Active Problem List   Diagnosis    HTN (hypertension), benign    Coronary atherosclerosis of native coronary artery    Palpitations    Coronary artery disease due to lipid rich plaque    Abnormal transaminases    Diverticulitis    Unstable angina ([address]    Midline thoracic back pain    Mixed hyperlipidemia    Bilateral carotid artery stenosis    Acute CVA (cerebrovascular accident) ([address]    Arterial ischemic stroke, ICA, left, acute ([address]    Mild malnutrition ([address]    Acute encephalopathy    New onset seizure ([address]    Encephalopathy, hypertensive    Acute respiratory failure with hypoxia ([address]    Aspiration into airway       Discharge Functional Status:    Physical therapy:  Bed Mobility:    Transfers:  ,  ,    Mobility:  ,  ,      Occupational therapy:  ,  ,      Speech therapy:       Inpatient Rehabilitation Course:   Brenda Pulido is a 76 y.o. male admitted to inpatient rehabilitation on 10/22/2020 s/p CVA. He was emergently transferred per documentation in the H&P on his first night of admission. Discharge Exam:  Unable to be performed due to acuity of transfer.   Audible stridor over the telephone    Significant Diagnostics:   Lab Results   Component Value Date    CREATININE 1.1 10/23/2020    BUN 22 (H) 10/23/2020     10/23/2020    K 3.9 10/23/2020     10/23/2020    CO2 23 10/23/2020       Lab Results   Component Value Date    WBC 9.0 10/23/2020    HGB 12.3 (L) 10/23/2020    HCT 35.5 (L) 10/23/2020    MCV 88.1 10/23/2020     10/23/2020       Disposition:  ICU in guarded condition    Follow-up:  See after visit summary from hospitalization    Discharge Medications: Medication List      ASK your doctor about these medications    amLODIPine 10 MG tablet  Commonly known as:  NORVASC  Take 1 tablet by mouth daily     clopidogrel 75 MG tablet  Commonly known as:  PLAVIX  Take 1 tablet by mouth daily     CoQ10 200 MG Caps  Take 1 tablet by mouth daily     hydrALAZINE 25 MG tablet  Commonly known as:  APRESOLINE  Take 3 tablets by mouth every 8 hours     labetalol 200 MG tablet  Commonly known as:  NORMODYNE  Take 1 tablet by mouth every 12 hours     losartan 50 MG tablet  Commonly known as:  COZAAR  Take 1 tablet by mouth daily     nitroGLYCERIN 0.4 MG/SPRAY 0.4 mg spray  Commonly known as:  NITROLINGUAL  Place 1 spray under the tongue every 5 minutes as needed for Chest pain     rosuvastatin 10 MG tablet  Commonly known as:  CRESTOR  TAKE 1 TABLET BY MOUTH EVERY DAY     sildenafil 100 MG tablet  Commonly known as:  Viagra  Take 1 tablet by mouth as needed for Erectile Dysfunction     VITAMIN B 12 PO           discharge order unable to be placed due to acuity of transfer. Tristen Martins MD    * This document was created using dictation software. While all precautions were taken to ensure accuracy, errors may have occurred. Please disregard any typographical errors.

## 2020-10-23 NOTE — PROGRESS NOTES
Tyree Rojas  10/23/2020  [id number]    Chief Complaint: <principal problem not specified>    Subjective:   Developed stridor overnight requiring urgent transfer to ICU. Remains obtunded with labored breathing. ROS: unable to be obtained.     Objective:  Patient Vitals for the past 24 hrs:   BP Temp Temp src Pulse Resp SpO2 Height Weight   10/23/20 1514 -- -- -- 79 23 96 % -- --   10/23/20 1513 -- -- -- -- 17 99 % -- --   10/23/20 1512 115/64 -- -- 81 29 98 % -- --   10/23/20 1432 (!) 169/76 -- -- 103 (!) 33 90 % -- --   10/23/20 1422 -- -- -- 103 30 91 % -- --   10/23/20 1400 (!) 183/71 -- -- 105 28 (!) 88 % -- --   10/23/20 1230 (!) 185/79 -- -- 95 29 90 % -- --   10/23/20 1200 (!) 182/70 98 °F (36.7 °C) Temporal 84 22 94 % -- --   10/23/20 1156 (!) 170/87 -- -- 85 (!) 32 93 % -- --   10/23/20 1149 132/77 -- -- 91 30 91 % -- --   10/23/20 1147 -- -- -- -- (!) 31 91 % -- --   10/23/20 1030 (!) 209/88 -- -- 76 24 98 % -- --   10/23/20 1000 (!) 123/110 -- -- 86 23 -- -- --   10/23/20 0930 (!) 169/149 -- -- 86 26 92 % -- --   10/23/20 0900 (!) 169/117 -- -- 83 27 97 % -- --   10/23/20 0859 -- -- -- -- -- -- 6' (1.829 m) --   10/23/20 0830 (!) 156/88 -- -- 78 29 94 % -- --   10/23/20 0800 (!) 178/161 97.6 °F (36.4 °C) Temporal 76 22 94 % -- --   10/23/20 0730 (!) 191/79 -- -- 87 24 92 % -- --   10/23/20 0700 (!) 221/125 -- -- 95 22 -- -- --   10/23/20 0600 (!) 194/120 -- -- 78 24 99 % -- --   10/23/20 0557 -- -- -- -- -- (!) 87 % -- --   10/23/20 0556 -- -- -- -- -- 92 % -- --   10/23/20 0555 -- -- -- -- -- (!) 83 % -- --   10/23/20 0530 (!) 158/142 97 °F (36.1 °C) Temporal 72 24 92 % -- --   10/23/20 0500 (!) 174/76 -- -- 67 14 -- -- --   10/23/20 0400 (!) 156/70 -- -- 64 21 -- -- --   10/23/20 0330 (!) 134/96 -- -- 65 20 -- -- --   10/23/20 0313 -- -- -- -- 17 95 % -- --   10/23/20 0311 -- -- -- -- 17 97 % -- --   10/23/20 0300 (!) 169/72 -- -- 64 19 94 % -- --   10/23/20 0246 (!) 159/75 -- -- 66 21 -- -- -- 10/23/20 0230 (!) 155/131 -- -- 68 19 -- -- --   10/23/20 0200 (!) 146/87 -- -- 69 19 99 % -- --   10/23/20 0154 -- -- -- -- 20 100 % -- --   10/23/20 0145 (!) 152/66 98 °F (36.7 °C) Temporal 65 19 99 % -- --   10/23/20 0140 -- 98 °F (36.7 °C) Temporal 66 19 98 % -- --   10/23/20 0134 126/89 -- -- 65 18 97 % 6' (1.829 m) 178 lb 5.6 oz (80.9 kg)   10/23/20 0015 -- -- -- -- 20 99 % -- --   10/22/20 2308 -- -- -- -- -- 98 % -- --     Gen: No distress, obtunded. Resting in bed  HEENT: Normocephalic, atraumatic. CV: No audible murmurs, well perfused extremities  Resp: No respiratory distress. No increased WOB  Abd: Soft, nontender nondistended  Ext: No edema. Neuro: Somnolent but arousable, aphasic, right hemiparesis remains. Laboratory data: Available via EMR. Therapy progress:  PT     Objective           OT        Assessment        SLP                Body mass index is 24.19 kg/m². Assessment:  Patient Active Problem List   Diagnosis    HTN (hypertension), benign    Coronary atherosclerosis of native coronary artery    Palpitations    Coronary artery disease due to lipid rich plaque    Abnormal transaminases    Diverticulitis    Unstable angina (HCC)    Midline thoracic back pain    Mixed hyperlipidemia    Bilateral carotid artery stenosis    Acute CVA (cerebrovascular accident) ([address]    Arterial ischemic stroke, ICA, left, acute ([address]    Mild malnutrition ([address]    Acute encephalopathy    New onset seizure ([address]    Encephalopathy, hypertensive    Acute respiratory failure with hypoxia ([address]    Aspiration into airway       Plan:   Large left PCA infarct: ASA, statin, BP control. PT/OT for hemiparesis, safety and endurance. SLP for cognition. Dysphagia: SLP   Acute encephalopathy: improved. Uncontrolled HTN  CAD  DM  HLD  GIB: s/p EGD with epi injections for ulcerative esophagitis, PPI     Dispo: Patient is not medically appropriate for ARU. Discussed with Dr. Saleem Santos.  We will continue to follow. Agree with Dr. Willian Alvarez regarding prognosis. If no improvement may be appropriate for serious goals of care discussion with family. Ibeth Villegas MD 10/23/2020, 3:56 PM    * This document was created using dictation software. While all precautions were taken to ensure accuracy, errors may have occurred. Please disregard any typographical errors.

## 2020-10-23 NOTE — PROGRESS NOTES
Patient appears to be struggling with breathing. Intermittent O2 saturation levels varies between 87%-94%. Patient responsive to command to take a deep breath. Abdominal retractions noted. Dr. Kai Urrutia notified. Albuterol nebulizer treatments ordered q 6 hours. Respiratory therapist notified.

## 2020-10-23 NOTE — DISCHARGE SUMMARY
Hospital Medicine Discharge Summary    Patient: Gay Haider     Gender: male  : 1945   Age: 76 y.o. MRN: [id number]    Admitting Physician: Armin Nguyen MD  Discharge Physician: Armin Nguyen MD     Code Status: Full Code     Admit Date: 10/13/2020   Discharge Date: 10/22/2020      Disposition:  [facility]    Discharge Diagnoses: Active Hospital Problems    Diagnosis Date Noted    Acute CVA (cerebrovascular accident) ([address] [I63.9] 10/13/2020       Follow-up appointments:  two weeks    Outpatient to do list: FOLLOW UP    Condition at Discharge:  [facility] Course:   76 y. o. male with PMHx significant for hypertension, coronary artery disease s/p stents hyperlipidemia, balance issues, mild cognitive impairment mated to the hospital after he had a fall and acting abnormally for the last 2 to 3 days  History obtained from patient and wife although patient does not seem to remember much and is not able to give that much of a detailed history  Wife called EMS this morning as patient was noted to be on the ground unclear how he ended up there.  She states over the last 48 hours he has not been feeling well  She noticed that he is weak generally but more on the right side and very confused  This morning patient was at the mental status that he has been for the last 2 days with no worsening  Patient denies any headache  Wife has not noticed any facial distortions however as noted the last 2 days he keeps asking the same question does not seem to remember much and has been acting very different since   She did he denied any chest pain or shortness of breath  No headache  EMS reported that there was a right upper extremity drift in the picked him up at around 6:15 AM  He has not been compliant with his medication     acute right hemiparesis and acute encephalopathy secondary to left sided infarct  MRI with infarct in the posterior circulation  CT angio of head and neck showed multiple intracranial stenoses  On Plavix, A1c 6.4  Echo normal, no PFO  Patient will need 30-day Holter monitor  Continue telemetry  His mental status waxes and wanes, some days are good some days are bad, does better with a family member in house      Acute kidney injury strongly suspect secondary to the blood pressure fluctuations  For now we will hold valsartan  Nephrology consulted, appreciate recommendation     Delirium   multifactorial, delirium precautions  -mostly stoke related and icu psych related. -does better with family present.       Hypertension malignant with endorgan damage  Continue labetalol, amlodipine  Continue to dysphagia diet  Continue hydralazine  Resume cozaar now that renal function normalized. GI bleed/ acute blood loss anemia    EGD   Ulcerative esophagitis with clot and diffuse oozing from ulceration.  No V.V.                 Controlled with epi injection.                 Large hiatal hernia               2 clean based ulcers in the bulb  - monitor H and H  IV PPI gtt  Resolved.        Elevated troponin  EKG shows some new T inversions in V3 and V4 as compared to previous EKG  He does not have any chest pain  Troponins more or less flat  Seen by cardiology  On  Plavix  On Lipitor full dose     Coronary  artery disease  On  Plavix  - resumed.  ASA stopped because of bleed.     Prediabetes  Start Metformin on discharge     L superior mediastinal widening on chest x-ray  His radial pulses are equal more importantly on the CT angiogram of the neck the chest x-ray report acknowledges that that area was included on the CT angiogram and was visualized and showed only prominent vasculature ends at this time will not pursue any further testing       Discharge Medications:   Discharge Medication List as of 10/22/2020  3:28 PM      START taking these medications    Details   hydrALAZINE (APRESOLINE) 25 MG tablet Take 3 tablets by mouth every 8 hours, Disp-90 tablet,R-3Print           Discharge Medication List as of 10/22/2020  3:28 PM      CONTINUE these medications which have CHANGED    Details   labetalol (NORMODYNE) 200 MG tablet Take 1 tablet by mouth every 12 hours, Disp-60 tablet,R-3Print           Discharge Medication List as of 10/22/2020  3:28 PM      CONTINUE these medications which have NOT CHANGED    Details   Cyanocobalamin (VITAMIN B 12 PO) Take by mouthHistorical Med      losartan (COZAAR) 50 MG tablet Take 1 tablet by mouth daily, Disp-90 tablet, R-3Normal      clopidogrel (PLAVIX) 75 MG tablet Take 1 tablet by mouth daily, Disp-90 tablet, R-0Normal      rosuvastatin (CRESTOR) 10 MG tablet TAKE 1 TABLET BY MOUTH EVERY DAY, Disp-90 tablet, R-3Normal      amLODIPine (NORVASC) 10 MG tablet Take 1 tablet by mouth daily, Disp-90 tablet, R-3Normal      Coenzyme Q10 (COQ10) 200 MG CAPS Take 1 tablet by mouth daily, Disp-30 capsule, R-11OTC      nitroGLYCERIN (NITROLINGUAL) 0.4 MG/SPRAY 0.4 mg spray Place 1 spray under the tongue every 5 minutes as needed for Chest pain, Disp-1 Bottle, R-2Normal      sildenafil (VIAGRA) 100 MG tablet Take 1 tablet by mouth as needed for Erectile Dysfunction, Disp-8 tablet, R-3           Discharge Medication List as of 10/22/2020  3:28 PM      STOP taking these medications       aspirin 81 MG tablet Comments:   Reason for Stopping:         HYDROcodone-acetaminophen (NORCO)  MG per tablet Comments:   Reason for Stopping:               Discharge ROS:  Not able to be assessed as he has an expressive aphasia. Discharge Exam:    BP (!) 140/80   Pulse 70   Temp 98.6 °F (37 °C) (Temporal)   Resp 18   Ht 6' (1.829 m)   Wt 187 lb 3.2 oz (84.9 kg)   SpO2 98%   BMI 25.39 kg/m²   General appearance: Awake and a bit agitated, squirmy  HEENT: Pupils equal, round, and reactive to light. Conjunctivae/corneas clear. Neck: Supple, with full range of motion. No jugular venous distention. Trachea midline. Respiratory:  Normal respiratory effort.  Clear to auscultation, bilaterally without RALES/WHEEZES/, slight Rhonchi. Cardiovascular: Regular rate and rhythm with normal S1/S2 without MURMURS, rubs or gallops. Abdomen: Soft, non-tender, non-distended with normal bowel sounds. Musculoskeletal: No clubbing, cyanosis or EDEMA bilaterally. some motion limited from stroke   Skin: Skin color, texture, turgor normal.  No rashes or lesions. Neurologic: Unable to do visual field testing  Right hemiplegia power is 1/5 on the right side  Some dysarthria  receptive aphasia  Awake alert   Unable to answer any of my orientation questions  Still has some significant right-sided neglect  All results of his stroke.         Labs: For convenience and continuity at follow-up the following most recent labs are provided:    Lab Results   Component Value Date    WBC 6.7 10/18/2020    HGB 11.2 10/22/2020    HCT 32.2 10/22/2020    MCV 88.0 10/18/2020     10/18/2020     10/22/2020    K 3.7 10/22/2020    K 3.9 10/13/2020     10/22/2020    CO2 21 10/22/2020    BUN 16 10/22/2020    CREATININE 1.0 10/22/2020    CALCIUM 8.9 10/22/2020    PHOS 2.9 10/22/2020    ALKPHOS 99 10/16/2020    ALT 14 10/16/2020    AST 13 10/16/2020    BILITOT 2.6 10/16/2020    BILIDIR 0.3 10/14/2020    LABALBU 3.3 10/22/2020    LDLCALC 116 10/13/2020    TRIG 180 10/13/2020     Lab Results   Component Value Date    INR 1.20 (H) 10/14/2020    INR 1.07 10/13/2020    INR 1.07 01/22/2016       Radiology:  Xr Chest (2 Vw)    Result Date: 10/13/2020  EXAMINATION: TWO XRAY VIEWS OF THE CHEST 10/13/2020 8:54 am COMPARISON: 02/20/2018.   CT angiography of the neck dated 10/13/2020 HISTORY: ORDERING SYSTEM PROVIDED HISTORY: dyspnea TECHNOLOGIST PROVIDED HISTORY: Reason for exam:->dyspnea Reason for Exam: Altered Mental Status (pt brought to ed by [place] after wife found him on floor, unsure how long pt confused unable to follow directions and answer questions correctly ) Acuity: Unknown Type of Exam: tissues. Aging or evolving of known left PCA vascular territory infarct. Large area of hypodensity is now seen in this region. No hemorrhage noted. Underlying atrophy with periventricular and at additional areas of small vessel ischemic change Small cavernoma posterior right parietal lobe     Ct Head Wo Contrast    Result Date: 10/13/2020  EXAMINATION: CT OF THE HEAD WITHOUT CONTRAST  10/13/2020 6:52 am TECHNIQUE: CT of the head was performed without the administration of intravenous contrast. Dose modulation, iterative reconstruction, and/or weight based adjustment of the mA/kV was utilized to reduce the radiation dose to as low as reasonably achievable. COMPARISON: None. HISTORY: ORDERING SYSTEM PROVIDED HISTORY: stroke TECHNOLOGIST PROVIDED HISTORY: Has a \"code stroke\" or \"stroke alert\" been called? ->Yes Reason for exam:->stroke Reason for Exam: stroke Acuity: Acute Type of Exam: Initial Relevant Medical/Surgical History: Altered Mental Status (pt brought to ed by [facility] after wife found him on floor, unsure how long pt confused unable to follow directions and answer questions correctly ) FINDINGS: BRAIN/VENTRICLES: Ventricles are midline in position and mildly enlarged in size. There is moderate periventricular hypodensity seen. Patchy and confluent hypodensity is seen scattered throughout the frontal and parietal white matter. Posteriorly in the periventricular region on the right there is a nodular area of increased density measuring 7 mm. This appears somewhat tubular in character. There is a bulbous appearance to the middle cerebral artery distally on the right. There is intracranial atherosclerosis. . ORBITS: The visualized portion of the orbits demonstrate no acute abnormality. SINUSES: Fluid is seen in the mastoid air cells bilaterally. Mild mucosal thickening is seen in the maxillary sinuses.   There is streak artifact from dental amalgam. SOFT TISSUES/SKULL:  No acute abnormality of the visualized skull or soft tissues. Posteriorly in the periventricular region on the right there is a nodular area of increased density measuring 7 mm. This appears somewhat tubular in character. .  This could represent a small a cavernoma or venous angioma. However a small area of hemorrhage could also have this appearance. There is a bulbous appearance to the middle cerebral artery distally on the right, either due to tortuous vessel or small aneurysm. Atrophy and small-vessel ischemic change Results discussed with [facility] by Sajan Daniels. Bautista Cheema MD at 7:06 am on 10/13/2020     Cta Head Neck W Contrast    Result Date: 10/13/2020  EXAMINATION: CTA OF THE HEAD AND NECK WITH CONTRAST 10/13/2020 6:57 am: TECHNIQUE: CTA of the head and neck was performed with the administration of intravenous contrast. Multiplanar reformatted images are provided for review. MIP images are provided for review. Stenosis of the internal carotid arteries measured using NASCET criteria. Dose modulation, iterative reconstruction, and/or weight based adjustment of the mA/kV was utilized to reduce the radiation dose to as low as reasonably achievable. COMPARISON: CT head October 13, 2020, MRI brain November 14, 2017 HISTORY: ORDERING SYSTEM PROVIDED HISTORY: stroke TECHNOLOGIST PROVIDED HISTORY: Reason for exam:->stroke Reason for Exam: stroke Acuity: Acute Type of Exam: Initial Relevant Medical/Surgical History: Altered Mental Status (pt brought to ed by [facility] after wife found him on floor, unsure how long pt confused unable to follow directions and answer questions correctly ) FINDINGS: CTA NECK: AORTIC ARCH/ARCH VESSELS: No dissection or arterial injury. No significant stenosis of the brachiocephalic or subclavian arteries. CAROTID ARTERIES: There is 70% stenosis at the origin of the right common carotid artery. There is 50% stenosis at the origin of the right internal carotid artery.   There is 30% stenosis at the origin of the left internal carotid artery. VERTEBRAL ARTERIES: There is stenosis along the right cervical vertebral artery, most severe at the distal V1 segment with up to 70% stenosis. No acute abnormality or flow stenosis in the cervical portion of the left vertebral artery. SOFT TISSUES: There is moderate emphysema. No cervical or superior mediastinal lymphadenopathy. The larynx and pharynx are unremarkable. No acute abnormality of the salivary glands. There is 1 cm left thyroid nodule, does not require follow-up due to small size. BONES: No acute osseous abnormality. CTA HEAD: ANTERIOR CIRCULATION: There is 75% stenosis at the origin of an M2 branch of the right MCA. No significant stenosis of the intracranial internal carotid, anterior cerebral, or left middle cerebral arteries. No aneurysm. POSTERIOR CIRCULATION: There is partial occlusion in the mid to distal V4 segment of the right vertebral artery. There is up to 40% stenosis in mid V4 segment of the left vertebral artery. There is partial occlusion in the P2 segment of the left PCA. No significant stenosis of the basilar, or right posterior cerebral arteries. No aneurysm. OTHER: No dural venous sinus thrombosis on this non-dedicated study. BRAIN: There is a 7 mm focus of hyperattenuation in the right parietal periventricular white matter, likely corresponding to a cavernoma, stable in size since MRI brain November 14, 2017. There is mild parenchymal volume loss. There is periventricular white matter low attenuation, likely related to mild to moderate chronic microvascular disease. There is mild hydrocephalus, stable. No mass effect or midline shift. No extra-axial fluid collection. The gray-white differentiation is maintained. There is mild mucosal thickening in the paranasal sinuses. There are moderate bilateral mastoid effusions. Partial occlusion in the mid to distal V4 segment of the right vertebral artery.  There is up to 40% stenosis in mid V4 segment of the left vertebral artery. Partial occlusion in the P2 segment of the left PCA. 75% stenosis at the origin of the a M2 branch of the right MCA. 70% stenosis at the origin of the right common carotid artery. 50% stenosis at the origin of the right internal carotid artery. 30% stenosis at the origin of the left internal carotid artery. 70% severe at the distal V1 segment of the right vertebral artery. Results were reported to Dr. Chelsea Macedo at 4:07 p.m. on October 13, 2020. Mri Brain Wo Contrast    Result Date: 10/13/2020  EXAMINATION: MRI OF THE BRAIN WITHOUT CONTRAST  10/13/2020 6:16 pm TECHNIQUE: Multiplanar multisequence MRI of the brain was performed without the administration of intravenous contrast. COMPARISON: None. HISTORY: ORDERING SYSTEM PROVIDED HISTORY: cva TECHNOLOGIST PROVIDED HISTORY: Reason for exam:->cva Reason for Exam: cva disorientation aphasia Acuity: Acute Type of Exam: Unknown FINDINGS: INTRACRANIAL STRUCTURES/VENTRICLES: There is an acute infarct within the left occipitotemporal lobe, extending into the ipsilateral thalamus. This measures at least 9 cm in diameter. No mass, shift, or acute intracranial bleed is identified. There is volume loss with mild-to-moderate chronic white matter microvascular ischemic change. Normal expected signal voids are present within the vessels at the base of skull. A focus of susceptibility artifact adjacent to the right lateral ventricle posteriorly on image number 23 of series 8 could reflect a small cavernoma. ORBITS: The visualized portion of the orbits demonstrate no acute abnormality. SINUSES: There is a moderate to severe right mastoid effusion. BONES/SOFT TISSUES: The bone marrow signal intensity appears normal. The soft tissues demonstrate no acute abnormality. Large acute left PCA vascular territory infarct.  The findings were sent to the Radiology Results [address] at 7:07 pm on 10/13/2020to be communicated to a licensed caregiver. EKG     Rhythm: sinus tachycardia  Rate: 100-110  Clinical Impression: unchanged when compared to previous EKG other than rate,        The patient was seen and examined on day of discharge and this discharge summary is in conjunction with any daily progress note from day of discharge. Time Spent on discharge is 35 minutes  in the examination, evaluation, counseling and review of medications and discharge plan. Note that greater  than 30 minutes was spent in preparing discharge papers, discussing discharge with patient, medication review, etc.       Signed:    Anmol Castillo MD   10/23/2020      Thank you Jake Stuart for the opportunity to be involved in this patient's care.  If you have any questions or concerns please feel free to contact me at [phone]

## 2020-10-23 NOTE — PROGRESS NOTES
Comprehensive Nutrition Assessment    Type and Reason for Visit:  Consult(Mike Score)    Nutrition Recommendations/Plan:   1. Continue NPO  2. Once diet advances add Glucerna TID   3. Consider nutrition support ; RD available for recc's if needed    Nutrition Assessment:  Pt at risk for nutrition compromise AEB poor PO intake since admission 10 days ago. Pt was admitted into ICU initially, transferred to rehab, and the following day transferred back to ICU. Pt has lost 9lbs (~5%) since admission. Pt currently NPO, SLP following. Pt currently in restrains d/t delirium and trying to pull out catheter. Was prev on full liquids for ulcerative esophagitis with minimal intake. Will add ONS once diet advances. Consider nutrition support to meet nutrient needs as pt has not had adequate nutrition in ten days. Malnutrition Assessment:  Malnutrition Status:  Mild malnutrition    Context:  Acute Illness     Findings of the 6 clinical characteristics of malnutrition:  Energy Intake:  7 - 50% or less of estimated energy requirements for 5 or more days  Weight Loss:  1 - 5% over 1 month(~5% in 10 days)     Body Fat Loss:  No significant body fat loss     Muscle Mass Loss:  No significant muscle mass loss    Fluid Accumulation:  No significant fluid accumulation     Strength:  Not Performed    Estimated Daily Nutrient Needs:  Energy (kcal):  4526-7904 (25-30kcal/81kg); Weight Used for Energy Requirements:  Current     Protein (g):   (1.2-1.4g/81kg); Weight Used for Protein Requirements:  Current        Fluid (ml/day):  1mL/kcal; Weight Used for Fluid Requirements:  Current      Nutrition Related Findings:  No edema noted. Delirium.       Wounds:  None       Anthropometric Measures:  · Height: 6' (182.9 cm)  · Current Body Weight: 178 lb (80.7 kg)   · Admission Body Weight: 187 lb (84.8 kg)    · Ideal Body Weight: 178 lbs; % Ideal Body Weight 100 %   · BMI: 24.1  · Adjusted Body Weight:  ; No Adjustment   · BMI Categories: Normal Weight (BMI 18.5-24. 9)       Nutrition Diagnosis:   · Mild malnutrition related to acute injury/trauma, inadequate protein-energy intake as evidenced by poor intake prior to admission, other (comment), weight loss(poor intake since admission)      Nutrition Interventions:   Food and/or Nutrient Delivery:  Continue NPO  Nutrition Education/Counseling:  Education not indicated   Coordination of Nutrition Care:  Continued Inpatient Monitoring    Goals:  Nutrition support or meal / supp intake greater than 50%       Nutrition Monitoring and Evaluation:   Food/Nutrient Intake Outcomes:  Diet Advancement/Tolerance, Food and Nutrient Intake, Supplement Intake, Enteral Nutrition Intake/Tolerance  Physical Signs/Symptoms Outcomes:  Chewing or Swallowing, Weight     Discharge Planning:     Too soon to determine     Electronically signed by Erasto Syed RD, LD on 10/23/20 at 9:13 AM EDT    Contact: [phone]

## 2020-10-23 NOTE — PROGRESS NOTES
10/23/20 1926   [facility] Patient Data   Static Compliance 62 mL/cmH2O   Dynamic Compliance 45.1 mL/cmH2O

## 2020-10-23 NOTE — PROGRESS NOTES
10/23/20 1514   [facility] Patient Data   Plateau Pressure 17 PLC43   Static Compliance 16 mL/cmH2O   Dynamic Compliance 39.15 mL/cmH2O

## 2020-10-23 NOTE — H&P
Patient: Isabelle Fonseca  [id number]  Date: 10/23/2020      Chief Complaint: aphasic     History of Present Illness/Hospital Course:  70-year-old male with a history of CAD, MI, HTN, HLD, and diabetes who was admitted on 10/13 with altered mental status. There was concern for stroke upon admission and CT head demonstrated findings concerning for a cavernoma or angioma. CTA revealed multivessel occlusion including the bilateral common carotids, bilateral internal carotids, right vertebral artery, right MCA, and left PCA. MRI revealed a large acute left PCA infarct. Neurosurgery evaluated given his mention stenosis and suggested medical management per neurology. Neurology suggested strict BP control and aspirin and statin therapy. Patient was reportedly noncompliant with his medications at home. His blood pressures have been elevated requiring transition to ICU for IV blood pressure support. He was evaluated by therapy and suggested to continue in an inpatient setting prior to returning home. He was unable to be evaluated due to the acuity of his transfer. Prior Level of Function:  independent    Current Level of Function:  dependent     has a past medical history of Acute MI ([facility] [address] CAD (coronary artery disease), Hyperlipidemia, Hypertension, and Type II or unspecified type diabetes mellitus without mention of complication, not stated as uncontrolled. has a past surgical history that includes Coronary angioplasty with stent (2000, 2/24/2012); Hand surgery (5/09); ESOPHAGOSCOPY; back surgery (2018); Hand surgery (Left, 2009); Cardiac surgery; and Upper gastrointestinal endoscopy (N/A, 10/19/2020). reports that he quit smoking about 38 years ago. He smoked 0.00 packs per day for 20.00 years. He has never used smokeless tobacco. He reports current alcohol use of about 6.0 standard drinks of alcohol per week. He reports that he does not use drugs.     family history includes Heart Disease in his father, maternal grandfather, and paternal grandfather; High Blood Pressure in his father; High Cholesterol in his father. Allergies: Patient has no known allergies. No current facility-administered medications for this encounter. No current outpatient medications on file.      Facility-Administered Medications Ordered in Other Encounters   Medication Dose Route Frequency Provider Last Rate Last Dose    clopidogrel (PLAVIX) tablet 75 mg  75 mg Oral Daily Marni Martinez MD        labetalol (NORMODYNE) tablet 200 mg  200 mg Oral 2 times per day Marni Martinez MD        sodium chloride flush 0.9 % injection 10 mL  10 mL Intravenous 2 times per day Marni Martinez MD   10 mL at 10/23/20 0959    sodium chloride flush 0.9 % injection 10 mL  10 mL Intravenous PRN Marni Martinez MD        acetaminophen (TYLENOL) tablet 650 mg  650 mg Oral Q6H PRN Marni Martinez MD        Or   Lissy Liu acetaminophen (TYLENOL) suppository 650 mg  650 mg Rectal Q6H PRN Marni Martienz MD        polyethylene glycol (GLYCOLAX) packet 17 g  17 g Oral Daily PRN Marni Martinez MD        promethazine (PHENERGAN) tablet 12.5 mg  12.5 mg Oral Q6H PRN Marni Martinez MD        Or    ondansetron (ZOFRAN) injection 4 mg  4 mg Intravenous Q6H PRN Marni Martinez MD        enoxaparin (LOVENOX) injection 40 mg  40 mg Subcutaneous Daily Marni Martinez MD        atorvastatin (LIPITOR) tablet 80 mg  80 mg Oral Nightly Marni Martinez MD        LORazepam (ATIVAN) injection 1 mg  1 mg Intravenous Q4H PRN Marni Martinez MD        labetalol (NORMODYNE;TRANDATE) injection 10 mg  10 mg Intravenous Q4H PRN Ramila Hodge MD   10 mg at 10/23/20 0743    hydrALAZINE (APRESOLINE) injection 5 mg  5 mg Intravenous Q4H PRN Ramila Hodge MD        clevidipine (CLEVIPREX) infusion  2 mg/hr Intravenous Continuous Ramila Hodge MD   Stopped at 10/23/20 1547    morphine (PF) injection 2 mg  2 mg Intravenous Q4H PRN Ramila Hodge MD   2 mg at 10/23/20 1310    propofol injection  10 mcg/kg/min Intravenous Titrated Omayra East MD 4.9 mL/hr at 10/23/20 1547 10 mcg/kg/min at 10/23/20 1547       REVIEW OF SYSTEMS:   Unable to be obtained due to acuity of transfer. Physical Examination:  Vitals:   Patient Vitals for the past 24 hrs:   BP Temp Temp src Pulse Resp SpO2   10/23/20 0030 (!) 185/91 97.4 °F (36.3 °C) Axillary 66 24 99 %   10/22/20 2315 (!) 144/69 98.6 °F (37 °C) Axillary 57 20 96 %   10/22/20 2230 (!) 151/74 98.4 °F (36.9 °C) Axillary 58 20 91 %   10/22/20 2105 (!) 142/78 98.2 °F (36.8 °C) Axillary 57 24 92 %   10/22/20 2010 (!) 201/88 98.6 °F (37 °C) Axillary 72 22 95 %   10/22/20 1815 -- -- -- -- -- 90 %   10/22/20 1745 -- -- -- -- -- 92 %   10/22/20 1742 -- -- -- -- -- (!) 88 %     Exam unable to be performed due to acuity of transfer.     Lab Results   Component Value Date    WBC 9.0 10/23/2020    HGB 12.3 (L) 10/23/2020    HCT 35.5 (L) 10/23/2020    MCV 88.1 10/23/2020     10/23/2020     Lab Results   Component Value Date    INR 1.20 (H) 10/14/2020    INR 1.07 10/13/2020    INR 1.07 01/22/2016    PROTIME 14.0 (H) 10/14/2020    PROTIME 12.4 10/13/2020    PROTIME 12.2 01/22/2016     Lab Results   Component Value Date    CREATININE 1.1 10/23/2020    BUN 22 (H) 10/23/2020     10/23/2020    K 3.9 10/23/2020     10/23/2020    CO2 23 10/23/2020     Lab Results   Component Value Date    ALT 14 10/16/2020    AST 13 (L) 10/16/2020    ALKPHOS 99 10/16/2020    BILITOT 2.6 (H) 10/16/2020       Most recent echocardiogram revealed   Procedure     Type of Study      TTE procedure:ECHOCARDIOGRAM COMPLETE 2D W DOPPLER W COLOR.     Procedure Date  Date: 10/13/2020 Start: 03:14 PM     Study Location: [facility] - Echo Lab  Technical Quality: Limited visualization due to patient immobility.     Indications:CVA.     Patient Status: Routine     Height: 72 inches Weight: 201 pounds BSA: 2.13 m2 BMI: 27.26 kg/m2     BP: 180/116 mmHg      Conclusions      Summary   -Normal global systolic function with an ejection fraction estimated at 70%.  -No regional wall motion abnormalities noted.   -Moderate concentric left ventricular hypertrophy noted.   -Mild aortic stenosis with a peak velocity of 2.34 m/s and a mean pressure   gradient of 11mmHg. The aortic valve area is estimated at 1.88 cm^2. No   significant regurgitation noted.   -There is mild mitral annular calcification noted.   -Grade I diastolic dysfunction with normal LV filling pressures.  Avg.   E/e'=11.7   -A bubble study was performed and fails to show evidence of shunting.     Most recent imaging studies revealed   EXAMINATION:    MRI OF THE BRAIN WITHOUT CONTRAST  10/13/2020 6:16 pm         TECHNIQUE:    Multiplanar multisequence MRI of the brain was performed without the    administration of intravenous contrast.         COMPARISON:    None.         HISTORY:    ORDERING SYSTEM PROVIDED HISTORY: cva    TECHNOLOGIST PROVIDED HISTORY:    Reason for exam:->cva    Reason for Exam: cva disorientation aphasia    Acuity: Acute    Type of Exam: Unknown         FINDINGS:    INTRACRANIAL STRUCTURES/VENTRICLES: There is an acute infarct within the left    occipitotemporal lobe, extending into the ipsilateral thalamus.  This    measures at least 9 cm in diameter.  No mass, shift, or acute intracranial    bleed is identified.  There is volume loss with mild-to-moderate chronic    white matter microvascular ischemic change.  Normal expected signal voids are    present within the vessels at the base of skull.  A focus of susceptibility    artifact adjacent to the right lateral ventricle posteriorly on image number    23 of series 8 could reflect a small cavernoma.         ORBITS: The visualized portion of the orbits demonstrate no acute abnormality.         SINUSES: There is a moderate to severe right mastoid effusion.         BONES/SOFT TISSUES: The bone marrow signal intensity appears normal. The soft    tissues demonstrate no acute abnormality.              Impression    Large acute left PCA vascular territory infarct.      EXAMINATION:    CTA OF THE HEAD AND NECK WITH CONTRAST 10/13/2020 6:57 am:         TECHNIQUE:    CTA of the head and neck was performed with the administration of intravenous    contrast. Multiplanar reformatted images are provided for review.  MIP images    are provided for review. Stenosis of the internal carotid arteries measured    using NASCET criteria. Dose modulation, iterative reconstruction, and/or    weight based adjustment of the mA/kV was utilized to reduce the radiation    dose to as low as reasonably achievable.         COMPARISON:    CT head October 13, 2020, MRI brain November 14, 2017         HISTORY:    ORDERING SYSTEM PROVIDED HISTORY: stroke    TECHNOLOGIST PROVIDED HISTORY:    Reason for exam:->stroke    Reason for Exam: stroke    Acuity: Acute    Type of Exam: Initial    Relevant Medical/Surgical History:  Altered Mental Status (pt brought to ed by    [facility] after wife found him on floor, unsure how long pt confused    unable to follow directions and answer questions correctly )         FINDINGS:         CTA NECK:         AORTIC ARCH/ARCH VESSELS: No dissection or arterial injury.  No significant    stenosis of the brachiocephalic or subclavian arteries.         CAROTID ARTERIES: There is 70% stenosis at the origin of the right common    carotid artery.  There is 50% stenosis at the origin of the right internal    carotid artery.  There is 30% stenosis at the origin of the left internal    carotid artery.         VERTEBRAL ARTERIES: There is stenosis along the right cervical vertebral    artery, most severe at the distal V1 segment with up to 70% stenosis.  No    acute abnormality or flow stenosis in the cervical portion of the left    vertebral artery.         SOFT TISSUES: There is moderate emphysema.  No cervical or superior    mediastinal lymphadenopathy.  The larynx and pharynx are unremarkable.  No    acute abnormality of the salivary glands.  There is 1 cm left thyroid nodule,    does not require follow-up due to small size.         BONES: No acute osseous abnormality.              CTA HEAD:         ANTERIOR CIRCULATION: There is 75% stenosis at the origin of an M2 branch of    the right MCA.  No significant stenosis of the intracranial internal carotid,    anterior cerebral, or left middle cerebral arteries. No aneurysm.         POSTERIOR CIRCULATION: There is partial occlusion in the mid to distal V4    segment of the right vertebral artery.  There is up to 40% stenosis in mid V4    segment of the left vertebral artery.  There is partial occlusion in the P2    segment of the left PCA.  No significant stenosis of the basilar, or right    posterior cerebral arteries. No aneurysm.         OTHER: No dural venous sinus thrombosis on this non-dedicated study.         BRAIN: There is a 7 mm focus of hyperattenuation in the right parietal    periventricular white matter, likely corresponding to a cavernoma, stable in    size since MRI brain November 14, 2017.  There is mild parenchymal volume    loss. Shaun Fail is periventricular white matter low attenuation, likely related    to mild to moderate chronic microvascular disease.  There is mild    hydrocephalus, stable.  No mass effect or midline shift. No extra-axial fluid    collection. The gray-white differentiation is maintained.  There is mild    mucosal thickening in the paranasal sinuses.  There are moderate bilateral    mastoid effusions.              Impression    Partial occlusion in the mid to distal V4 segment of the right vertebral    artery. There is up to 40% stenosis in mid V4 segment of the left vertebral    artery.         Partial occlusion in the P2 segment of the left PCA.          75% stenosis at the origin of the a M2 branch of the right MCA.         70% stenosis at the origin of the right common carotid artery.         50% stenosis at the origin of the right internal carotid artery.         30% stenosis at the origin of the left internal carotid artery.         70% severe at the distal V1 segment of the right vertebral artery.       EXAMINATION:    CT OF THE HEAD WITHOUT CONTRAST  10/13/2020 6:52 am         TECHNIQUE:    CT of the head was performed without the administration of intravenous    contrast. Dose modulation, iterative reconstruction, and/or weight based    adjustment of the mA/kV was utilized to reduce the radiation dose to as low    as reasonably achievable.         COMPARISON:    None.         HISTORY:    ORDERING SYSTEM PROVIDED HISTORY: stroke    TECHNOLOGIST PROVIDED HISTORY:    Has a \"code stroke\" or \"stroke alert\" been called? ->Yes    Reason for exam:->stroke    Reason for Exam: stroke    Acuity: Acute    Type of Exam: Initial    Relevant Medical/Surgical History: Altered Mental Status ([facility] on floor, unsure how long pt confused    unable to follow directions and answer questions correctly )         FINDINGS:    BRAIN/VENTRICLES: Ventricles are midline in position and mildly enlarged in    size.  There is moderate periventricular hypodensity seen.  Patchy and    confluent hypodensity is seen scattered throughout the frontal and parietal    white matter.         Posteriorly in the periventricular region on the right there is a nodular    area of increased density measuring 7 mm.  This appears somewhat tubular in    character.         There is a bulbous appearance to the middle cerebral artery distally on the    right.  There is intracranial atherosclerosis. .         ORBITS: The visualized portion of the orbits demonstrate no acute abnormality.         SINUSES: Fluid is seen in the mastoid air cells bilaterally.  Mild mucosal    thickening is seen in the maxillary sinuses.  There is streak artifact from    dental amalgam.      SOFT TISSUES/SKULL:  No acute abnormality of the visualized skull or soft    tissues.              Impression    Posteriorly in the periventricular region on the right there is a nodular    area of increased density measuring 7 mm.  This appears somewhat tubular in    character. .  This could represent a small a cavernoma or venous angioma. However a small area of hemorrhage could also have this appearance.         There is a bulbous appearance to the middle cerebral artery distally on the    right, either due to tortuous vessel or small aneurysm.         Atrophy and small-vessel ischemic change             The above laboratory data have been reviewed.      The above imaging data have been reviewed.      The above medical testing have been reviewed. Body mass index is 25.36 kg/m². Barriers to Discharge: weakness, cognition, endurance. POST ADMISSION PHYSICIAN EVALUATION  The patient has agreed to being admitted to our comprehensive inpatient  rehabilitation facility consisting of at least 180 minutes of therapy a day,  5 out of 7 days a week. The patient/family has a good understanding of our discharge process. The  patient has potential to make improvement and is in need of at least two of  the following multidisciplinary therapies including but not limited to  physical, occupational, respiratory, and speech, nutritional services, wound care, and prosthetics and orthotics. Given the patients complex condition  and risk of further medical complications, rehabilitation services cannot be  safely provided at a lower level of care such as a skilled nursing facility. I have compared the patients medical and functional status at the time of the  preadmission screening and the same on this date, and there are no significant changes except as documented below in the assessment and plan.     By signing this document, I acknowledge that I have personally performed a  full physical examination on this patient within 24 hours of admission to  this inpatient rehabilitation facility and have determined the patient to be  able to tolerate the above course of treatment at an intensive level for a  reasonable period of time. I will be completing a detailed individualized  Plan of Care for this patient by day four of the patients stay based upon the  Preadmission Screen, this Post-Admission Evaluation, and the therapy  evaluations. Assessment and Plan:  Patient with stridor noted over the phone that was audibly evident. This did not respond to respiratory treatments including DuoNeb and racemic epinephrine. Decision was made to consult the hospitalist who suggested ICU transfer to ensure airway preservation. Dionne Lomas MD 10/23/2020, 4:39 PM     * This document was created using dictation software. While all precautions were taken to ensure accuracy, errors may have occurred. Please disregard any typographical errors.

## 2020-10-23 NOTE — PROGRESS NOTES
Pt resting in bed. Restless but calm at time of assessment. Appears comfortable in no distress. Assessment charted. No complaints. Instructed to use for assistance. Denies needs at this time. Removes tele patches and oxygen probe often. Will monitor.

## 2020-10-23 NOTE — PROGRESS NOTES
Respiratory therapist in to evaluate patient. Albuterol nebulizer treatment completed. O2 saturation 94-98% after treatment. Abdominal retractions somewhat diminished. Patient compliant with nasal canula O2 therapy.

## 2020-10-23 NOTE — TELEPHONE ENCOUNTER
Patient's Name - Wes JENNINGS - [birth year]    Room & Bed # - ICU 16    Reason for Consult - Rehab sent back maybe a stroke    Referring Provider - Joanne Dai     Nurse name & Number - Serge Hmyan [phone]    How urgent - routine

## 2020-10-24 NOTE — PROGRESS NOTES
Hospitalist Progress Note      PCP: Aida Meredith    Date of Admission: 10/23/2020    Chief Complaint: Confusion and right-sided weakness    Hospital Course:   76 y.o. male with PMHx significant for hypertension, coronary artery disease s/p stents hyperlipidemia, balance issues, mild cognitive impairment mated to the hospital after he had a fall and acting abnormally for the last 2 to 3 days  History obtained from patient and wife although patient does not seem to remember much and is not able to give that much of a detailed history  Wife called EMS this morning as patient was noted to be on the ground unclear how he ended up there. She states over the last 48 hours he has not been feeling well  She noticed that he is weak generally but more on the right side and very confused  This morning patient was at the mental status that he has been for the last 2 days with no worsening  Patient denies any headache  Wife has not noticed any facial distortions however as noted the last 2 days he keeps asking the same question does not seem to remember much and has been acting very different since Sunday  She did he denied any chest pain or shortness of breath  No headache  EMS reported that there was a right upper extremity drift in the picked him up at around 6:15 AM  He has not been compliant with his medication. He was discharged to [facility] rehab and had an immediate rapid response on 10/22. He got some morphine and some labetalol and his pressure got better and he calmed down. Later that day he was talking to his daughter. At night he worsened and was SOB, labored breathing and sounded stridorous. He was readmitted to the inpatient hospital to ICU. This morning (10/23/2020) he is a little less responsive than previous. CT scan done is essentially the same and no bleed. He had an eeg that showed general slowing, going to be intubated to get mri.     Subjective  More obtunded and snoring Rare 02/28/2015    RBCUA 191 10/23/2020    BLOODU LARGE 10/23/2020    SPECGRAV 1.022 10/23/2020    GLUCOSEU Negative 10/23/2020       Radiology:  XR CHEST PORTABLE   Final Result   Supportive tubing is in normal position. Mild bibasilar airspace disease. MRI BRAIN WO CONTRAST   Final Result   1. Interval increase in extent of a large volume left posterior cerebral   artery territory infarct. 2. No significant mass effect or acute intracranial hemorrhage. 3. Nonvisualized left posterior cerebral artery flow void consistent with   high-grade stenosis or occlusion. 4. Diminished flow void in the distal V3 and proximal V4 segments right   vertebral artery consistent with known partial occlusion. 5. Stable right parietal cavernoma. XR CHEST PORTABLE   Final Result   Endotracheal tube in good position. Improved aeration of both lungs with decreased but persistent left greater   than right lower lobe airspace disease         CT HEAD WO CONTRAST   Final Result   Motion limited. No obvious hemorrhage      Recent left occipital lobe-PCA infarct, similar to prior      Small cavernoma on the right, similar to prior         XR CHEST PORTABLE   Final Result   New bilateral lower lobe disease favored to be due to edema or atelectasis   given the symmetry although pneumonia could have this appearance.              Assessment/Plan:    Active Hospital Problems    Diagnosis Date Noted    Acute respiratory failure with hypoxia ([address] [J96.01] 10/23/2020    Acute encephalopathy [G93.40]     New onset seizure ([address] [R56.9]     Encephalopathy, hypertensive [I67.4]     Aspiration into airway [T17.908A]     Arterial ischemic stroke, ICA, left, acute ([address] [I63.232] 10/22/2020    HTN (hypertension), benign [I10] 08/02/2011     acute right hemiparesis and acute encephalopathy secondary to left sided infarct  MRI with infarct in the posterior circulation  CT angio of head and neck showed multiple intracranial stenoses, repeat ct is unchanged. Resumed on Plavix, A1c 6.4  Echo normal, no PFO  Continue telemetry  REPEAT mri shows   Interval increase in extent of a large volume left posterior cerebral    artery territory infarct. Intubated for MRI and remains intubated for airway protection  He was not in respiratory failure at the time of intubation    Malignant HTN  - on cleviprex gtt  -bp better  -better now    Acute kidney injury strongly suspect secondary to the blood pressure fluctuations  For now we will hold valsartan  Nephrology consulted, appreciate recommendation    Delirium  - most likely related to stroke and poorly controlled BP  He does better when his BP is better. - work up continues. UA ordered, CXR shows some right sided airspace disease, he may have aspirated, however he does not have respiratory failure   MRI showed that stroke area increased since MRI 10 days ago, this is not unexpected. Hypertension malignant with endorgan damage  Continue labetalol, IV hydralazine. npo  Nephrology is following    GI bleed/ acute blood loss anemia    EGD   Ulcerative esophagitis with clot and diffuse oozing from ulceration. No V.V. Controlled with epi injection.                  Large hiatal hernia               2 clean based ulcers in the bulb  - monitor H and H  IV PPI BID       Elevated troponin  EKG shows some new T inversions in V3 and V4 as compared to previous EKG  He does not have any chest pain  Troponins more or less flat  Seen by cardiology  On aspirin and Plavix  On Lipitor full dose    Coronary  artery disease  On aspirin and Plavix  - on hold    Prediabetes  Start Metformin on discharge    L superior mediastinal widening on chest x-ray  His radial pulses are equal more importantly on the CT angiogram of the neck the chest x-ray report acknowledges that that area was included on the CT angiogram and was visualized and showed only prominent vasculature ends at this time will not pursue any further testing      DVT Prophylaxis: sc lovenox   Diet: Diet NPO Effective Now  Code Status: Full Code      Dispo - he is going to be intubated to be able to obtain an MRI and see if the stroke is worse than ct shows.       Mesha Nguyễn MD

## 2020-10-24 NOTE — PROGRESS NOTES
Initial shift assessment completed, see complex assessment in doc flowsheet. Pt sedated on vent, opens eyes but does not sustain eye contact. Vera draining yellow clear urine. Restraints remain in place. Pt repositioned. Bed in low position with wheels locked. Will continue to monitor.

## 2020-10-24 NOTE — PROGRESS NOTES
Reassessment complete, pt RR >30. Propofol gtt remains on at 35 mcg. No acute changes at this time. Will continue to monitor.

## 2020-10-24 NOTE — PROGRESS NOTES
Shift assessment complete, see flowsheet. VSS. Pt currently sedated on ventilator. Vent settings AC 15, Vt 500, FiO2 40%, PEEP 5. NS and Propofol gtt's infusing, see MAR. Pt currently in bilateral soft wrist restraints with no sign of injury. Pt's daughter at bedside, all questions answered. Whiteboard updated, bed in lowest position with wheels locked and alarm on, will continue to monitor.

## 2020-10-24 NOTE — PROGRESS NOTES
10/24/20 0301   Spontaneous Breathing Trial (SBT) RT Doc   Contraindications to SBT?    (no SBT per RN and RT report)

## 2020-10-24 NOTE — PROGRESS NOTES
Reassessment complete, pt tachypneic with respirations > 30. Propofol at 35 mcg. Pt repositioned. No other acute changes at this time, will continue to monitor.

## 2020-10-24 NOTE — PROGRESS NOTES
mg, Oral, Q6H PRN **OR** acetaminophen (TYLENOL) suppository 650 mg, 650 mg, Rectal, Q6H PRN  polyethylene glycol (GLYCOLAX) packet 17 g, 17 g, Oral, Daily PRN  promethazine (PHENERGAN) tablet 12.5 mg, 12.5 mg, Oral, Q6H PRN **OR** ondansetron (ZOFRAN) injection 4 mg, 4 mg, Intravenous, Q6H PRN  enoxaparin (LOVENOX) injection 40 mg, 40 mg, Subcutaneous, Daily  atorvastatin (LIPITOR) tablet 80 mg, 80 mg, Oral, Nightly  LORazepam (ATIVAN) injection 1 mg, 1 mg, Intravenous, Q4H PRN  labetalol (NORMODYNE;TRANDATE) injection 10 mg, 10 mg, Intravenous, Q4H PRN  hydrALAZINE (APRESOLINE) injection 5 mg, 5 mg, Intravenous, Q4H PRN  clevidipine (CLEVIPREX) infusion, 2 mg/hr, Intravenous, Continuous  morphine (PF) injection 2 mg, 2 mg, Intravenous, Q4H PRN  propofol injection, 10 mcg/kg/min, Intravenous, Titrated    No Known Allergies    Social History:    TOBACCO:   reports that he quit smoking about 38 years ago. He smoked 0.00 packs per day for 20.00 years. He has never used smokeless tobacco.  ETOH:   reports current alcohol use of about 6.0 standard drinks of alcohol per week. Patient currently lives independently  Environmental/chemical exposure: None known    Family History:       Problem Relation Age of Onset    Heart Disease Father     High Blood Pressure Father     High Cholesterol Father     Heart Disease Maternal Grandfather     Heart Disease Paternal Grandfather      REVIEW OF SYSTEMS:    ROS is unobtainable due to his critical illness.       Objective:   PHYSICAL EXAM:      VITALS:  BP (!) 121/55   Pulse 71   Temp 98.8 °F (37.1 °C) (Temporal)   Resp 30   Ht 6' (1.829 m)   Wt 178 lb 5.6 oz (80.9 kg)   SpO2 95%   BMI 24.19 kg/m²      24HR INTAKE/OUTPUT:      Intake/Output Summary (Last 24 hours) at 10/24/2020 0918  Last data filed at 10/24/2020 0600  Gross per 24 hour   Intake 1539.64 ml   Output 800 ml   Net 739.64 ml     CONSTITUTIONAL: Nonresponsive   NECK:  Supple, symmetrical, trachea midline, no adenopathy, thyroid symmetric, not enlarged and no tenderness, skin normal  LUNGS: Labored breathing and a Cheyne-Lynne respiratory pattern and clear to auscultation. No accessory muscle use  CARDIOVASCULAR: S1 and S2, no edema and no JVD  ABDOMEN:  normal bowel sounds, non-distended and no masses palpated, and no tenderness to palpation. No hepatospleenomegaly  LYMPHADENOPATHY:  no axillary or supraclavicular adenopathy. No cervical adnenopathy  PSYCHIATRIC: Nonresponsive  MUSCULOSKELETAL: No obvious misalignment or effusion of the joints. No clubbing, cyanosis of the digits. SKIN:  normal skin color, texture, turgor and no redness, warmth, or swelling. No palpable nodules    DATA:    Old records have been reviewed    CBC:  Recent Labs     10/22/20  1133 10/23/20  0906 10/23/20  1134 10/24/20  0454   WBC  --   --  9.0 13.6*   RBC  --   --  4.02* 3.46*   HGB 11.2* 10.7* 12.3* 10.5*   HCT 32.2*  --  35.5* 31.1*   PLT  --   --  235 217   MCV  --   --  88.1 89.9   MCH  --   --  30.6 30.3   MCHC  --   --  34.8 33.7   RDW  --   --  13.8 14.0      BMP:  Recent Labs     10/22/20  0506 10/23/20  1134 10/24/20  0454    143 143   K 3.7 3.9 4.2    107 112*   CO2 21 23 19*   BUN 16 22* 33*   CREATININE 1.0 1.1 1.9*   CALCIUM 8.9 9.3 8.7   GLUCOSE 134* 140* 141*      ABG:  Recent Labs     10/23/20  0906 10/24/20  0615   PHART 7.411 7.442   LRA7CGN 37.1 29.4*   PO2ART 73.8* 76.8   QUR1NCB 23.6 20.0*   A7KOFPUW 95 96.8   BEART -1 -3.3*       No results found for: BNP  Lab Results   Component Value Date    CKTOTAL 80 10/18/2020    TROPONINI 0.03 (H) 10/15/2020       Cultures:     Abx:    Radiology Review:  All pertinent images / reports were reviewed as a part of this visit. Assessment:     1. Acute respiratory failure  2. Prior CVA  3. Hypertensive emergency  4. Acute encephalopathy  5.  Aspiration pneumonia    I have reviewed laboratories, medical records and images for this visit  He is afebrile  Does have an

## 2020-10-24 NOTE — PROGRESS NOTES
[address] [facility]  Neurology Follow-up  [facility] Neurology    Date of Service: 10/24/2020    Subjective:   CC: Follow up today regarding: Acute encephalopathy and recent stroke. Events noted. Chart and lab reviewed. The patient had his EEG yesterday which showed no evidence of nonconvulsive seizures. CT followed by MRI brain which I did review which showed extension of his left posterior temporal and PCA stroke compared to last MRI from the 13th. The patient is now intubated and sedated. His pupils reactive. Other review system was limited at this point. Discussed with his daughter.       ROS: limited due to intubation        Past Medical History:   Diagnosis Date    Acute MI ([facility] [address] 2000    CAD (coronary artery disease)     Hyperlipidemia     Hypertension     Type II or unspecified type diabetes mellitus without mention of complication, not stated as uncontrolled     diet controlled     Current Facility-Administered Medications   Medication Dose Route Frequency Provider Last Rate Last Dose    0.45 % sodium chloride infusion   Intravenous Continuous Ann-Marie Kramer  mL/hr at 10/24/20 0921      pantoprazole (PROTONIX) injection 40 mg  40 mg Intravenous Daily Kolton Cameron MD   40 mg at 10/24/20 1020    clopidogrel (PLAVIX) tablet 75 mg  75 mg Oral Daily Mj Thompson MD   Stopped at 10/24/20 0750    labetalol (NORMODYNE) tablet 200 mg  200 mg Oral 2 times per day Mj Thompson MD   Stopped at 10/24/20 0750    sodium chloride flush 0.9 % injection 10 mL  10 mL Intravenous 2 times per day Mj Thompson MD   10 mL at 10/24/20 0811    sodium chloride flush 0.9 % injection 10 mL  10 mL Intravenous PRN Mj Thompson MD        acetaminophen (TYLENOL) tablet 650 mg  650 mg Oral Q6H PRN Mj Thompson MD        Or    acetaminophen (TYLENOL) suppository 650 mg  650 mg Rectal Q6H PRN Mj Thompson MD        polyethylene glycol (GLYCOLAX) packet 17 g  17 g Oral Daily PRN Mj Thompson MD        promethazine (PHENERGAN) tablet 12.5 mg  12.5 mg Oral Q6H PRN Kristopher Velasquez MD        Or    ondansetron (ZOFRAN) injection 4 mg  4 mg Intravenous Q6H PRN Kristopher Velasquez MD        enoxaparin (LOVENOX) injection 40 mg  40 mg Subcutaneous Daily Kristopher Velasquez MD   40 mg at 10/24/20 0811    atorvastatin (LIPITOR) tablet 80 mg  80 mg Oral Nightly Kristopher Velasquez MD        LORazepam (ATIVAN) injection 1 mg  1 mg Intravenous Q4H PRN Kristopher Velasquez MD        labetalol (NORMODYNE;TRANDATE) injection 10 mg  10 mg Intravenous Q4H PRN Amanda Funes MD   10 mg at 10/23/20 2065    hydrALAZINE (APRESOLINE) injection 5 mg  5 mg Intravenous Q4H PRN Amanda Funes MD        morphine (PF) injection 2 mg  2 mg Intravenous Q4H PRN Amanda Funes MD   2 mg at 10/23/20 1310    propofol injection  10 mcg/kg/min Intravenous Titrated Romana Martinez MD 14.6 mL/hr at 10/24/20 1244 30 mcg/kg/min at 10/24/20 1244     No Known Allergies  family history includes Heart Disease in his father, maternal grandfather, and paternal grandfather; High Blood Pressure in his father; High Cholesterol in his father. reports that he quit smoking about 38 years ago. He smoked 0.00 packs per day for 20.00 years. He has never used smokeless tobacco. He reports current alcohol use of about 6.0 standard drinks of alcohol per week. He reports that he does not use drugs. Objective:  Exam:  Constitutional:   Vitals:    10/24/20 1100 10/24/20 1200 10/24/20 1300 10/24/20 1305   BP: (!) 112/49 (!) 149/66 (!) 143/54    Pulse: 72 80 76 77   Resp: 30 26 29 27   Temp:  98 °F (36.7 °C)     TempSrc:  Temporal     SpO2: 94% 100% 94% 94%   Weight:       Height:           General appearance: intubated and sedated  Eye: No icterus. Neck: supple  Cardiovascular:    No lower leg edema with good pulsation.    Mental Status: intubated  Cranial Nerves:   Pupil: RRR  No gaze preference  OCR: present  Corneal: No  Cough: No  Gag: No  Spontaneous breathing: Yes  II: Pupils: equal, round, reactive to light  III,IV,VI: No gaze preference. VII: Face is symmetric   CN from VIII to XII: Cannot be tested due to intubation. Musculoskeletal: no motor response  Tone: normal  Reflexes: diminished but symmetric   Planters: mute  Coordination: sensation and Gait/Posture: Cannot be tested due to intubation. Data:  LABS:   Lab Results   Component Value Date     10/24/2020    K 4.2 10/24/2020    K 3.9 10/23/2020     10/24/2020    CO2 19 10/24/2020    BUN 33 10/24/2020    CREATININE 1.9 10/24/2020    GFRAA 42 10/24/2020    GFRAA >60 02/25/2012    LABGLOM 35 10/24/2020    GLUCOSE 141 10/24/2020    PHOS 2.9 10/22/2020    MG 2.50 10/17/2020    CALCIUM 8.7 10/24/2020     Lab Results   Component Value Date    WBC 13.6 10/24/2020    RBC 3.46 10/24/2020    HGB 10.5 10/24/2020    HCT 31.1 10/24/2020    MCV 89.9 10/24/2020    RDW 14.0 10/24/2020     10/24/2020     Lab Results   Component Value Date    INR 1.20 (H) 10/14/2020    PROTIME 14.0 (H) 10/14/2020       Neuroimaging, EEG and labs were independently reviewed by me. Reviewed MRI brain from yesterday and compared to MRI of the brain from the 13th. His MRI showed extension of his recent stroke. Discussed results with his daughter. Reviewed notes from different physicians. Impression:  Acute encephalopathy, severe. Likely combination of extension of his recent left hemispheric CVA in addition to metabolic encephalopathy. Reviewed MRI brain and discussed with his daughter. Acute respiratory failure with hypoxia  Hypertension, not controlled  Hyperlipidemia  Acute kidney injury. Creatinine 1.9.       Recommendation    Continue current supportive care  Continue wean sedation when possible  Respiratory support  Hydration  Blood sugar monitor  Follow electrolytes and creatinine  Continue therapy  Blood pressure monitor and avoid blood pressure below 140-90  Prognosis remains guarded and

## 2020-10-24 NOTE — PROGRESS NOTES
10/24/20 0828   Vent Patient Data   Plateau Pressure 11 HRB33   Static Compliance 54 mL/cmH2O   Dynamic Compliance 43.2 mL/cmH2O

## 2020-10-24 NOTE — PROGRESS NOTES
Attempt at weaning sedation failed. Titrated propofol down to 15 mcg/kg/min and pt started alarming on vent, respiration rate increased to 30's/40's, and BP increased as well. Pt now titrated per order, see MAR. Family at bedside all questions answered, will continue to monitor.

## 2020-10-25 NOTE — PROGRESS NOTES
Patient take off bipap and attempted high flow O2 again. Patient unable to maintain SpO2 >90. Placed back on bipap.

## 2020-10-25 NOTE — PROGRESS NOTES
Received results from ABG collection. High suspicion for venous blood based on  ABG results. Redrew ABG, see results review. Will continue to monitor. Pt remains on spontaneous mode on vent and tolerating.

## 2020-10-25 NOTE — PROGRESS NOTES
Shift assessment completed, see docflowsheets. Patient in bed, awakens to name and turns head to voice. Nods yes and no. L hand  weak,  moves L foot. Lungs clear, diminished,  On spontaneous mode on ventilator and tolerating well with SpO2 98% and respirations 20. BP stable. SR on monitor. Will continue to monitor.

## 2020-10-25 NOTE — PROGRESS NOTES
Reassessment complete, see flowsheets. Pt remains sedated on ventilator with Propofol gtt, see MAR. Pt still not following commands. Pt FiO2 decreased to 55%, SpO2 97%. OG remains @ 70 to low intermittent suction, see flowsheets. Pt henry intact and draining, see flowsheets. Pt has bilateral wrist restraints, see flowsheets. Pt on 0.45% sodium chloride gtt infusing, see MAR. Pt repositioned for comfort. Bed in lowest position and alarm on. Will continue to monitor.

## 2020-10-25 NOTE — PROGRESS NOTES
Patient making snoring respirations. Rate 29, 93% SpO2 on 100% fios 12/8 on bipap. Using accessory muscles at this time. Still pulling volumes in the 700s. ST on monitor.

## 2020-10-25 NOTE — PROGRESS NOTES
Propofol gtt stopped @0148 for sedation vacation, see MAR.     @0241-RT to bedside to place pt vent settings to spontaneous. @0400-Reassessment complete, see flowsheets. Pt remains on mechanical ventilation on spontaneous mode and Propofol gtt remains off, see MAR. Pt able to shake head yes or no but will not make eye contact or follow commands. Pt FiO2 decreased to 50%, SpO2 97%. OG remains to low intermittent suction, see flowsheets. Pt has bilateral wrist restraints, see flowsheets. Pt on 0.45% sodium chloride gtt infusing, see MAR. Pt repositioned for comfort. Bed in lowest position and alarm on. Will continue to [employer] collected and sent awaiting results, will continue to monitor.

## 2020-10-25 NOTE — PROGRESS NOTES
Patient NT suctioned for thick creamy/tan secretions, on 6L O2 nc,SpO2 90-91%, Placed on high flow cannula 10L. Patient is mouth breathing. 86% SpO2.

## 2020-10-25 NOTE — PROGRESS NOTES
This note also relates to the following rows which could not be included:  SpO2 - Cannot attach notes to unvalidated device data  Pulse - Cannot attach notes to unvalidated device data       10/25/20 0239   Vent Information   Vent Type 840   Vent Mode CPAP   Pressure Support 12 cmH20   FiO2  55 %   PEEP/CPAP 5   Vent Patient Data   Peak Inspiratory Pressure 18 cmH2O   Mean Airway Pressure 11 cmH20   Rate Measured 24 br/min   Spontaneous  mL   Minute Volume 18.2 Liters   Spontaneous Breathing Trial (SBT) RT Doc   RSBI Calculated 34.93

## 2020-10-25 NOTE — PROGRESS NOTES
Patient extubated, lots of thick creamy secretions.   Suctioned, making snoring sounds, SpO2 87% on 3L O2

## 2020-10-25 NOTE — PROGRESS NOTES
[address] [facility]  Neurology Follow-up  [facility] Neurology    Date of Service: 10/25/2020    Subjective:   CC: Follow up today regarding: Acute encephalopathy and recent stroke. Events noted. Chart and lab reviewed. The patient has been off sedation since 2:00 in the morning. Continues to be extubated but able to follow direction today. Has good brainstem function. Discussed with his daughter. No other new symptoms today. No active seizure.     ROS: limited due to intubation        Past Medical History:   Diagnosis Date    Acute MI ([address] 2000    CAD (coronary artery disease)     Hyperlipidemia     Hypertension     Type II or unspecified type diabetes mellitus without mention of complication, not stated as uncontrolled     diet controlled     Current Facility-Administered Medications   Medication Dose Route Frequency Provider Last Rate Last Dose    0.45 % sodium chloride infusion   Intravenous Continuous Tutu Triplett  mL/hr at 10/25/20 1008      pantoprazole (PROTONIX) injection 40 mg  40 mg Intravenous Daily Laura Harris MD   40 mg at 10/25/20 0904    clopidogrel (PLAVIX) tablet 75 mg  75 mg Oral Daily Erika Snow MD   75 mg at 10/25/20 0904    labetalol (NORMODYNE) tablet 200 mg  200 mg Oral 2 times per day Erika Snow MD   200 mg at 10/25/20 0904    sodium chloride flush 0.9 % injection 10 mL  10 mL Intravenous 2 times per day Erika Snow MD   10 mL at 10/25/20 0904    sodium chloride flush 0.9 % injection 10 mL  10 mL Intravenous PRN Erika Snow MD        acetaminophen (TYLENOL) tablet 650 mg  650 mg Oral Q6H PRN Erika Snow MD        Or   Elias Funes acetaminophen (TYLENOL) suppository 650 mg  650 mg Rectal Q6H PRN Erika Snow MD        polyethylene glycol (GLYCOLAX) packet 17 g  17 g Oral Daily PRN Erika Snow MD        promethazine (PHENERGAN) tablet 12.5 mg  12.5 mg Oral Q6H PRN Erika Snow MD        Or    ondansetron (ZOFRAN) injection 4 mg  4 mg Intravenous Q6H PRN Marni Martinez MD        enoxaparin (LOVENOX) injection 40 mg  40 mg Subcutaneous Daily Marni Martinez MD   40 mg at 10/25/20 0904    atorvastatin (LIPITOR) tablet 80 mg  80 mg Oral Nightly Marni Martinez MD   80 mg at 10/24/20 2103    LORazepam (ATIVAN) injection 1 mg  1 mg Intravenous Q4H PRN Marni Martinez MD        labetalol (NORMODYNE;TRANDATE) injection 10 mg  10 mg Intravenous Q4H PRN Ramila Hodge MD   10 mg at 10/23/20 8104    hydrALAZINE (APRESOLINE) injection 5 mg  5 mg Intravenous Q4H PRN Ramila Hodge MD        morphine (PF) injection 2 mg  2 mg Intravenous Q4H PRN Ramila Hodge MD   2 mg at 10/23/20 1310    propofol injection  10 mcg/kg/min Intravenous Titrated El Marion MD   Stopped at 10/25/20 0148     No Known Allergies  family history includes Heart Disease in his father, maternal grandfather, and paternal grandfather; High Blood Pressure in his father; High Cholesterol in his father. reports that he quit smoking about 38 years ago. He smoked 0.00 packs per day for 20.00 years. He has never used smokeless tobacco. He reports current alcohol use of about 6.0 standard drinks of alcohol per week. He reports that he does not use drugs. Objective:  Exam:  Constitutional:   Vitals:    10/25/20 0832 10/25/20 0900 10/25/20 1000 10/25/20 1100   BP:  (!) 155/58 (!) 143/55 (!) 138/49   Pulse: 75 79 66 65   Resp: 21 21 21 19   Temp:       TempSrc:       SpO2: 99% 97% 98% 98%   Weight:       Height:           General appearance: Intubated but can follow direction. Eye: No icterus. Neck: supple  Cardiovascular:    No lower leg edema with good pulsation. Mental Status: intubated but can open his eyes to voice and grimaces to painful stimulation. Cranial Nerves:   Pupil: RRR  No gaze preference  OCR: present  Corneal: Yes  Cough: Yes  Gag: No  Spontaneous breathing: Yes  II: Pupils: equal, round, reactive to light  III,IV,VI: No gaze preference.    VII: software. Although all attempts are made to edit the dictation for accuracy, there may be errors in the transcription that are not intended.

## 2020-10-25 NOTE — PROGRESS NOTES
Dr Oliva Serum at bedside and spoke with family. Will control BP and start precedex. Remains on Bipap 12/8 100% FiO2, SpO2 93%.

## 2020-10-25 NOTE — PROGRESS NOTES
[facility] Pulmonary and Critical Care  Progress note      Reason for Consult: Acute respiratory failure, altered mental status, hypertensive emergency  Requesting Physician: Dr. Ashley Mcneal  Subjective:   [facility] / HPI:                The patient is a 76 y.o. male with significant past medical history of:      Diagnosis Date    Acute MI ([address] 2000    CAD (coronary artery disease)     Hyperlipidemia     Hypertension     Type II or unspecified type diabetes mellitus without mention of complication, not stated as uncontrolled     diet controlled     Interval history: The patient failed weaning attempt yesterday due to tachypnea. This morning, however, he has tolerated spontaneous breathing since at least 5 AM.  He is off of propofol and is responding somewhat.      Past Surgical History:        Procedure Laterality Date    BACK SURGERY  2018    steel bracket inbetween vertebrae    CARDIAC SURGERY      stents    CORONARY ANGIOPLASTY WITH STENT PLACEMENT  2000, 2/24/2012    X4 previously 2000; X1 2/24/2012    ESOPHAGOSCOPY      HAND SURGERY  5/09    HAND SURGERY Left 2009    UPPER GASTROINTESTINAL ENDOSCOPY N/A 10/19/2020    EGD SUBMUCOSAL INJECTION OF EPINEPHRINE INTO GE JUNCTION ULCER performed by Warren Montenegro MD at [id number] [facility]     Current Medications:    Current Facility-Administered Medications: 0.45 % sodium chloride infusion, , Intravenous, Continuous  pantoprazole (PROTONIX) injection 40 mg, 40 mg, Intravenous, Daily  clopidogrel (PLAVIX) tablet 75 mg, 75 mg, Oral, Daily  labetalol (NORMODYNE) tablet 200 mg, 200 mg, Oral, 2 times per day  sodium chloride flush 0.9 % injection 10 mL, 10 mL, Intravenous, 2 times per day  sodium chloride flush 0.9 % injection 10 mL, 10 mL, Intravenous, PRN  acetaminophen (TYLENOL) tablet 650 mg, 650 mg, Oral, Q6H PRN **OR** acetaminophen (TYLENOL) suppository 650 mg, 650 mg, Rectal, Q6H PRN  polyethylene glycol (GLYCOLAX) packet 17 g, 17 g, Oral, Daily PRN  promethazine (PHENERGAN) tablet 12.5 mg, 12.5 mg, Oral, Q6H PRN **OR** ondansetron (ZOFRAN) injection 4 mg, 4 mg, Intravenous, Q6H PRN  enoxaparin (LOVENOX) injection 40 mg, 40 mg, Subcutaneous, Daily  atorvastatin (LIPITOR) tablet 80 mg, 80 mg, Oral, Nightly  LORazepam (ATIVAN) injection 1 mg, 1 mg, Intravenous, Q4H PRN  labetalol (NORMODYNE;TRANDATE) injection 10 mg, 10 mg, Intravenous, Q4H PRN  hydrALAZINE (APRESOLINE) injection 5 mg, 5 mg, Intravenous, Q4H PRN  morphine (PF) injection 2 mg, 2 mg, Intravenous, Q4H PRN  propofol injection, 10 mcg/kg/min, Intravenous, Titrated    No Known Allergies    Social History:    TOBACCO:   reports that he quit smoking about 38 years ago. He smoked 0.00 packs per day for 20.00 years. He has never used smokeless tobacco.  ETOH:   reports current alcohol use of about 6.0 standard drinks of alcohol per week. Patient currently lives independently  Environmental/chemical exposure: None known    Family History:       Problem Relation Age of Onset    Heart Disease Father     High Blood Pressure Father     High Cholesterol Father     Heart Disease Maternal Grandfather     Heart Disease Paternal Grandfather      REVIEW OF SYSTEMS:    ZION is unobtainable due to his critical illness. Objective:   PHYSICAL EXAM:      VITALS:  BP (!) 155/58   Pulse 66   Temp 98.8 °F (37.1 °C) (Temporal)   Resp 21   Ht 6' (1.829 m)   Wt 179 lb 7.3 oz (81.4 kg)   SpO2 97%   BMI 24.34 kg/m²      24HR INTAKE/OUTPUT:      Intake/Output Summary (Last 24 hours) at 10/25/2020 1111  Last data filed at 10/25/2020 0600  Gross per 24 hour   Intake 3047.9 ml   Output 1070 ml   Net 1977.9 ml     CONSTITUTIONAL: Nonresponsive   NECK:  Supple, symmetrical, trachea midline, no adenopathy, thyroid symmetric, not enlarged and no tenderness, skin normal  LUNGS: Labored breathing and a Cheyne-Lynne respiratory pattern and clear to auscultation.  No accessory muscle use  CARDIOVASCULAR: S1 and S2, no edema and no JVD  ABDOMEN:  normal bowel sounds, non-distended and no masses palpated, and no tenderness to palpation. No hepatospleenomegaly  LYMPHADENOPATHY:  no axillary or supraclavicular adenopathy. No cervical adnenopathy  PSYCHIATRIC: Nonresponsive  MUSCULOSKELETAL: No obvious misalignment or effusion of the joints. No clubbing, cyanosis of the digits. SKIN:  normal skin color, texture, turgor and no redness, warmth, or swelling. No palpable nodules    DATA:    Old records have been reviewed    CBC:  Recent Labs     10/23/20  1134 10/24/20  0454 10/25/20  0412   WBC 9.0 13.6* 13.3*   RBC 4.02* 3.46* 2.98*   HGB 12.3* 10.5* 9.3*   HCT 35.5* 31.1* 27.1*    217 181   MCV 88.1 89.9 90.9   MCH 30.6 30.3 31.2   MCHC 34.8 33.7 34.3   RDW 13.8 14.0 14.1      BMP:  Recent Labs     10/23/20  1134 10/24/20  0454 10/25/20  0412    143 140   K 3.9 4.2 3.8    112* 109   CO2 23 19* 20*   BUN 22* 33* 37*   CREATININE 1.1 1.9* 2.2*   CALCIUM 9.3 8.7 8.4   GLUCOSE 140* 141* 147*      ABG:  Recent Labs     10/24/20  0615 10/25/20  0433 10/25/20  0505   PHART 7.442 7.299* 7.430   AJT3QZE 29.4* 44.3 28.7*   PO2ART 76.8 see below 79.0   UNX0EIJ 20.0* 21.8 19.1*   J0FLSPOP 96.8 42.2* 96.6   BEART -3.3* -4.5* -4.1*       No results found for: BNP  Lab Results   Component Value Date    CKTOTAL 80 10/18/2020    TROPONINI 0.03 (H) 10/15/2020       Cultures:     Abx:    Radiology Review:  All pertinent images / reports were reviewed as a part of this visit. Assessment:     1. Acute respiratory failure  2. Prior CVA  3. Hypertensive emergency  4. Acute encephalopathy  5. Aspiration pneumonia    I have reviewed laboratories, medical records and images for this visit  Chest x-ray this morning does reveal some increased density at the right lung base likely atelectasis  He is afebrile.   Leukocytosis is stable  He is not on antibiotics  Has tolerated sedation vacation and spontaneous breathing  I think he is ready for trial liberation from mechanical ventilation  His daughter was at the bedside and all questions were addressed      Total critical care time caring for this patient with life threatening illness, including direct patient contact, management of life support systems, review of data including imaging and labs, discussions with other team members and physicians is at least 32 minutes so far today, excluding procedures.

## 2020-10-25 NOTE — PROGRESS NOTES
Pt assessment complete, see flowsheets. Medications given, see MAR. Pt sedated on ventilator with Propofol gtt, see MAR. Pt awakens to voice/stimuli but will not sustain eye contact or follow commands. Pt temp 99.5-room cooled and covers off. Pt ETT #8 AC/VC rate 15, , 60%, and PEEP 5. Pt NSR on monitor. OG placed @ 70 mario-air bolus heard-to low intermittent suction, see flowsheets. Pt henry intact and draining, see flowsheets. Pt has bilateral wrist restraints, see flowsheets. Pt has R wrist and forearm peripheral IV access, see flowsheets. Pt on 0.45% sodium chloride gtt infusing, see MAR. Pt has scattered bruising, see flowsheets. Pt repositioned for comfort. Bed in lowest position and alarm on. Will continue to monitor.

## 2020-10-25 NOTE — PLAN OF CARE
Problem: Skin Integrity:  Goal: Will show no infection signs and symptoms  Description: Will show no infection signs and symptoms  Outcome: Ongoing   Pt has q2 repositioning with pillow support. Pt has sacral border to sacrum to prevent skin breakdown. Problem: Falls - Risk of:  Goal: Will remain free from falls  Description: Will remain free from falls  Outcome: Ongoing   All fall risk precautions in place. Bed in lowest position and alarm on.

## 2020-10-25 NOTE — PROGRESS NOTES
Hospitalist Progress Note      PCP: Bibi Salgado    Date of Admission: 10/23/2020    Chief Complaint: Confusion and right-sided weakness    Hospital Course:   76 y.o. male with PMHx significant for hypertension, coronary artery disease s/p stents hyperlipidemia, balance issues, mild cognitive impairment mated to the hospital after he had a fall and acting abnormally for the last 2 to 3 days  History obtained from patient and wife although patient does not seem to remember much and is not able to give that much of a detailed history  Wife called EMS this morning as patient was noted to be on the ground unclear how he ended up there. She states over the last 48 hours he has not been feeling well  She noticed that he is weak generally but more on the right side and very confused  This morning patient was at the mental status that he has been for the last 2 days with no worsening  Patient denies any headache  Wife has not noticed any facial distortions however as noted the last 2 days he keeps asking the same question does not seem to remember much and has been acting very different since Sunday  She did he denied any chest pain or shortness of breath  No headache  EMS reported that there was a right upper extremity drift in the picked him up at around 6:15 AM  He has not been compliant with his medication. [facility] and had an immediate rapid response on 10/22. He got some morphine and some labetalol and his pressure got better and he calmed down. Later that day he was talking to his daughter. At night he worsened and was SOB, labored breathing and sounded stridorous. He was readmitted to the inpatient hospital to ICU. This morning (10/23/2020) he is a little less responsive than previous. CT scan done is essentially the same and no bleed. He was intubated for MRI which did show progression of the stroke as expected.   He was extubated on 10/25    Subjective  EXTUBATED THIS MORNING (10/25). Did well for most of the day but started neurologic breathing again around 16:00    Medications:  Reviewed      Exam:    BP (!) 166/66   Pulse 77   Temp 99.5 °F (37.5 °C) (Temporal)   Resp 27   Ht 6' (1.829 m)   Wt 179 lb 7.3 oz (81.4 kg)   SpO2 94%   BMI 24.34 kg/m²     General appearance: opnes eyes and looks to his name, but breathing hard and snoring even when awake, appears to be a neurologic breathing pattern. HEENT: Pupils equal, round, and reactive to light. Conjunctivae/corneas clear. Neck: Supple, with full range of motion. No jugular venous distention. Trachea midline. Respiratory: Breathing was better this am, he is now back in the neurologic pattern and he is snoring like he did before. Cardiovascular: Regular rate and rhythm with normal S1/S2 without MURMURS, rubs or gallops. Abdomen: Soft, non-tender, non-distended with normal bowel sounds. Musculoskeletal: No clubbing, cyanosis or EDEMA bilaterally. Skin: Skin color, texture, turgor normal.  No rashes or lesions. Neurologic: Unable to do visual field testing  Right hemiplegia power is 1/5 on the right side  Some dysarthria  receptive aphasia  Unable to answer any of my orientation questions  Still has some significant right-sided neglect        Labs:   Recent Labs     10/23/20  1134 10/24/20  0454 10/25/20  0412   WBC 9.0 13.6* 13.3*   HGB 12.3* 10.5* 9.3*   HCT 35.5* 31.1* 27.1*    217 181     Recent Labs     10/23/20  1134 10/24/20  0454 10/25/20  0412    143 140   K 3.9 4.2 3.8    112* 109   CO2 23 19* 20*   BUN 22* 33* 37*   CREATININE 1.1 1.9* 2.2*   CALCIUM 9.3 8.7 8.4     No results for input(s): AST, ALT, BILIDIR, BILITOT, ALKPHOS in the last 72 hours. No results for input(s): INR in the last 72 hours. No results for input(s): Norris Hug in the last 72 hours.     Urinalysis:      Lab Results   Component Value Date    NITRU Negative 10/23/2020 WBCUA 4 10/23/2020    BACTERIA Rare 02/28/2015    RBCUA 191 10/23/2020    BLOODU LARGE 10/23/2020    SPECGRAV 1.022 10/23/2020    GLUCOSEU Negative 10/23/2020       Radiology:  XR CHEST PORTABLE   Final Result   Stable support lines. New right basilar airspace opacities are favored to reflect atelectasis. Pneumonia or aspiration could be considered in the right clinical setting. XR CHEST PORTABLE   Final Result   Supportive tubing is in normal position. Mild bibasilar airspace disease. MRI BRAIN WO CONTRAST   Final Result   1. Interval increase in extent of a large volume left posterior cerebral   artery territory infarct. 2. No significant mass effect or acute intracranial hemorrhage. 3. Nonvisualized left posterior cerebral artery flow void consistent with   high-grade stenosis or occlusion. 4. Diminished flow void in the distal V3 and proximal V4 segments right   vertebral artery consistent with known partial occlusion. 5. Stable right parietal cavernoma. XR CHEST PORTABLE   Final Result   Endotracheal tube in good position. Improved aeration of both lungs with decreased but persistent left greater   than right lower lobe airspace disease         CT HEAD WO CONTRAST   Final Result   Motion limited. No obvious hemorrhage      Recent left occipital lobe-PCA infarct, similar to prior      Small cavernoma on the right, similar to prior         XR CHEST PORTABLE   Final Result   New bilateral lower lobe disease favored to be due to edema or atelectasis   given the symmetry although pneumonia could have this appearance.              Assessment/Plan:    Active Hospital Problems    Diagnosis Date Noted    Acute respiratory failure with hypoxia ([address] [J96.01] 10/23/2020    Acute encephalopathy [G93.40]     New onset seizure ([address] [R56.9]     Encephalopathy, hypertensive [I67.4]     Aspiration into airway [T17.908A]     Arterial ischemic stroke, ICA, left, acute [facility]) [I63.232] 10/22/2020    HTN (hypertension), benign [I10] 08/02/2011     acute right hemiparesis and acute encephalopathy secondary to left sided infarct  MRI with infarct in the posterior circulation  CT angio of head and neck showed multiple intracranial stenoses, repeat ct is unchanged. Resumed on Plavix, A1c 6.4  Echo normal, no PFO  Continue telemetry  REPEAT mri shows   Interval increase in extent of a large volume left posterior cerebral    artery territory infarct. Intubated for MRI and remains intubated for airway protection  He was not in respiratory failure at the time of intubation. Extubated and did well most of the day bet at about 6 pm started the neurologic breathing pattern  ABG checked and looks okay. Patient placed on BiPAP for support. I think if we control his BP he will imrpve. Acute kidney injury strongly suspect secondary to the blood pressure fluctuations  For now we will hold valsartan  Nephrology will need to be reconsulted    Delirium  - most likely related to stroke and poorly controlled BP  He does better when his BP is better. - work up continues. UA ordered, CXR shows some right sided airspace disease, he may have aspirated, however he does not have respiratory failure   MRI showed that stroke area increased since MRI 10 days ago, this is not unexpected. At this point I think this is all neurologic    Hypertension malignant with endorgan damage  Continue IV labetalol,  And IV hydralazine. npo  Nephrology  To be reconsulted  Blood pressures is back up to 791 systolic  Schedule labetalol and put clonidine patch on    GI bleed/ acute blood loss anemia    EGD   Ulcerative esophagitis with clot and diffuse oozing from ulceration. No V.V. Controlled with epi injection.                  Large hiatal hernia               2 clean based ulcers in the bulb  - monitor H and H  IV PPI BID       Elevated troponin  EKG shows some new T inversions in V3 and V4 as compared to previous EKG  He does not have any chest pain  Troponins more or less flat  Seen by cardiology  On aspirin and Plavix  On Lipitor full dose    Coronary  artery disease  On aspirin and Plavix  - on hold  Will resume    Prediabetes  Start Metformin on discharge    L superior mediastinal widening on chest x-ray  His radial pulses are equal more importantly on the CT angiogram of the neck the chest x-ray report acknowledges that that area was included on the CT angiogram and was visualized and showed only prominent vasculature ends at this time will not pursue any further testing      DVT Prophylaxis: sc lovenox   Diet: Diet NPO Effective Now  Code Status: Full Code      Dispo - He is looking like he might need to be re-intubated. I feel like his breathing is neurologic checked his blood gas and it looks good. Will try to avoid intubation. Placed on Bipap  for support. While he does have some slight findings on xray, I suspect this is being driven by his recent large stroke. He remains full code. I did discuss with family for them to think what his wishes would be and what quality of life he would be okay with. 10 minutes spent in this discussion.       Jaylen Wu MD

## 2020-10-25 NOTE — PROGRESS NOTES
Patient has been weaned from Mechanical Ventilation.  Placed on HFNL at 12 lpm. SpO2 86-87%, Pt then placed on bipap 14/8 and 60%, Spo2 99%

## 2020-10-26 NOTE — PROGRESS NOTES
Atropine resolved bradycardia. Patient still hypotensive and hypoxic. Respirations slowing, but still adequate.

## 2020-10-26 NOTE — PROGRESS NOTES
Postmortem care provided. All lines and tubes and foreign objects removed. [place] [facility] and placed into custody of security, Cortez Wang.

## 2020-10-26 NOTE — PROGRESS NOTES
Bradycardia found on monitor. BP low. spO2 dropping. .4 mg atropine given via verbal order with MD at bedside.

## 2020-10-26 NOTE — PROGRESS NOTES
home [facility] [employer] called.  Informed of potential hold and instructed to follow up with security / morgue in the AM.

## 2020-10-26 NOTE — PROGRESS NOTES
Patient cleaned and presented for family. Patient without any belongings at this time. Family with questions regarding  home and next steps.

## 2020-10-26 NOTE — PROCEDURES
Endotracheal Intubation Procedure Note    Indication for endotracheal intubation: respiratory failure  Sedation: Propofol  Paralytic: None  Lidocaine: no  Atropine: no  Equipment: Glide scope  Cricoid Pressure: no  Number of attempts: 1  ETT location confirmed by by auscultation, by CXR and ETCO2 monitor    Estimated blood loss, 0  Mallampati score 3  ASA 5. Total time of procedure  5   Minutes.
Patient: Marco Burleson    MR Number: [id number]  YOB: 1945  Date of Visit: 10/23/2020    Clinical History:  The patient is a 76y.o. years old male with new onset encephalopathy and right gaze preference. Recent stroke. Rule out nonconvulsive seizures. Medications reviewed. Method: The EEG was performed utilizing the international 10/20 of electrode placements of both referential and bipolar montages. The patient was drowsy and restless throughout the recording. He was not cooperative with EEG recording. Findings: The background of the EEG showed diffuse generalized slowing in the mixture of theta and delta with average amplitude. This slowing was symmetric, nonrhythmical and continuous throughout the recording. No EEG seizure or spike or sharp waves could be seen. EEG was disrupted by motion artifact. Impression: This EEG is abnormal.  The diffuse generalized slowing is suggestive of moderate diffuse encephalopathy. There is no evidence of epileptiform discharges, focal, or lateralizing abnormalities.       Estevan Almanzar MD      Board certified in clinical neurophysiology
Zander Carter is a 76 y.o. male patient. 1. Other specified hypotension      Past Medical History:   Diagnosis Date    Acute MI ([facility] [address]    CAD (coronary artery disease)     Hyperlipidemia     Hypertension     Type II or unspecified type diabetes mellitus without mention of complication, not stated as uncontrolled     diet controlled     Blood pressure (!) 158/66, pulse 100, temperature 97.9 °F (36.6 °C), temperature source Temporal, resp. rate (!) 32, height 6' (1.829 m), weight 179 lb 7.3 oz (81.4 kg), SpO2 (!) 88 %. Intubation    Date/Time: 10/26/2020 12:25 AM  Performed by: Jaziel Murphy MD  Authorized by: Jaziel Murphy MD   Consent: The procedure was performed in an emergent situation. Patient identity confirmed: hospital-assigned identification number  Indications: respiratory failure  Intubation method: video-assisted  Patient status: unconscious  Preoxygenation: nonrebreather mask  Paralytic: none  Tube size: 7.5 mm  Number of attempts: 2  Ventilation between attempts: BVM  Cricoid pressure: no  Cords visualized: yes  Post-procedure assessment: chest rise and ETCO2 monitor  Breath sounds: equal and absent over the epigastrium  Cuff inflated: yes  ETT to lip: 25 cm  Tube secured with: ETT terry  Comments: Ended up extubating after wife asked that resuscitation be stopped.           Jaziel Murphy MD  10/26/2020
insertion  Location details: right femoral  Patient position: flat  Catheter type: triple lumen  Ultrasound guidance: yes  Number of attempts: 1  Successful placement: yes  Post-procedure: line sutured and dressing applied  Assessment: blood return through all ports  Comments: EBL 10 cc          Tatiana Santos MD  10/26/2020

## 2020-10-26 NOTE — PROGRESS NOTES
Change in respirations noted. While rate remains unchanged, overall, respirations are becoming more irregular with some pauses. Patient still using accessory muscles while on significant bipap support. MD notified to assess patient.

## 2020-10-26 NOTE — PROGRESS NOTES
ABG obtained after following proper protocol and procedure. PH 6.88 and bipap settings are further impacting BP. MD notified to come to bedside. Plans to line and intubate.

## 2020-10-26 NOTE — PROGRESS NOTES
Bradycardia again observed. x1 epinephrine given. x1 atropine followed after 2 minutes. Asystole noted on monitor at 2357. Code called and compressions initiated.

## 2020-11-16 ENCOUNTER — TELEPHONE (OUTPATIENT)
Dept: CARDIOLOGY CLINIC | Age: 75
End: 2020-11-16

## 2020-11-16 NOTE — TELEPHONE ENCOUNTER
Pt wife calling her  past away last month and she would like to speak to DGB about him pls call to advise thank you

## 2020-12-03 NOTE — DISCHARGE SUMMARY
[facility] Medicine  Death/Discharge Summary    Name:Jose Maria Rojas       :  1945              MRN:  [id number]    Admit date:  10/23/2020    Discharge date:      Admitting Physician:  Frankie Nguyen MD  Discharge Physician: Carmen Jett MD        Admission Condition:  poor    Discharged Condition:      History of Present Illness: 76 y. o. male with PMHx significant for hypertension, coronary artery disease s/p stents hyperlipidemia, balance issues, mild cognitive impairment mated to the hospital after he had a fall and acting abnormally for the last 2 to 3 days  History obtained from patient and wife although patient does not seem to remember much and is not able to give that much of a detailed history  Wife called EMS this morning as patient was noted to be on the ground unclear how he ended up there.  She states over the last 48 hours he has not been feeling well  She noticed that he is weak generally but more on the right side and very confused  This morning patient was at the mental status that he has been for the last 2 days with no worsening  Patient denies any headache  Wife has not noticed any facial distortions however as noted the last 2 days he keeps asking the same question does not seem to remember much and has been acting very different since   She did he denied any chest pain or shortness of breath  No headache  EMS reported that there was a right upper extremity drift in the picked him up at around 6:15 AM  He has not been compliant with his medication.     [facility] and had an immediate rapid response on 10/22. He got some morphine and some labetalol and his pressure got better and he calmed down. Later that day he was talking to his daughter.   At night he worsened and was SOB, labored breathing and sounded stridorous.     He was readmitted to the inpatient hospital to ICU.     This morning (10/23/2020) he is a little less responsive than previous. CT scan done is essentially the same and no bleed. He was intubated for MRI which did show progression of the stroke as expected. He was extubated on 10/25  Went to inpatient rehab and then came back. He continued to have neurologic breathing pattern  We held off on intubation as this was primarily stroke related and his lungs were not the issue. Eventually he began to decline and was intubated again. He continued to decline and was coded. Family came in and asked for code to be stopped. Patient . Patient went into cardiorespiratory arrest.  ACLS protocol followed for asystole arrest.  Patient received multiple doses of epinephrine, bicarbonate, calcium gluconate. He was endotracheally intubated at bedside. No return of spontaneous circulation after multiple rounds of CPR. Wife, 3 daughters, grandchildren finally came to hospital.  Wife asked that we stop CPR RN 12:06 AM, stating patient would not want further resuscitative measures. Patient pronounced immediately after stopping CPR as he remained without pulse or spontaneous breathing.       Time of death: 12:06 AM on 10/26/2020  Discharge Physical Exam:    Called by nurse that patient had become asystole on telemetry. Patient seen and examined. No palpable pulse. Pupils fixed and dilated. No cardiac or pulmonary activity. Patient pronounced dead. Time of Death: 12:06 am on 10/26    Cause of Death: cardio pulmonary arrest secondary to acute respiratory failure  Secondary to massive stoke.         Disposition:   [facility]    Time spent on Discharged is 30 minutes      Signed:  Devaughn Cunningham MD  12/3/2020, 8:17 AM

## (undated) DEVICE — PROCEDURE KIT ENDOSCP CUST

## (undated) DEVICE — BW-412T DISP COMBO CLEANING BRUSH: Brand: SINGLE USE COMBINATION CLEANING BRUSH

## (undated) DEVICE — SET VLV 3 PC AWS DISPOSABLE GRDIAN SCOPEVALET

## (undated) DEVICE — MOUTHPIECE ENDOSCP L CTRL OPN AND SIDE PORTS DISP

## (undated) DEVICE — NEEDLE 25GAX5.0MM INJ CARR LOCKE

## (undated) DEVICE — SOLUTION IV IRRIG WATER 500ML POUR BRL ST 2F7113